# Patient Record
Sex: MALE | Race: WHITE | HISPANIC OR LATINO | Employment: FULL TIME | URBAN - METROPOLITAN AREA
[De-identification: names, ages, dates, MRNs, and addresses within clinical notes are randomized per-mention and may not be internally consistent; named-entity substitution may affect disease eponyms.]

---

## 2017-04-10 ENCOUNTER — APPOINTMENT (EMERGENCY)
Dept: RADIOLOGY | Facility: HOSPITAL | Age: 22
End: 2017-04-10
Payer: COMMERCIAL

## 2017-04-10 ENCOUNTER — HOSPITAL ENCOUNTER (EMERGENCY)
Facility: HOSPITAL | Age: 22
Discharge: HOME | End: 2017-04-10
Attending: EMERGENCY MEDICINE | Admitting: EMERGENCY MEDICINE
Payer: COMMERCIAL

## 2017-04-10 VITALS
HEIGHT: 67 IN | TEMPERATURE: 97.8 F | RESPIRATION RATE: 18 BRPM | OXYGEN SATURATION: 97 % | WEIGHT: 180 LBS | SYSTOLIC BLOOD PRESSURE: 153 MMHG | DIASTOLIC BLOOD PRESSURE: 84 MMHG | BODY MASS INDEX: 28.25 KG/M2 | HEART RATE: 112 BPM

## 2017-04-10 DIAGNOSIS — J45.901 ASTHMA EXACERBATION: Primary | ICD-10-CM

## 2017-04-10 DIAGNOSIS — B34.9 VIRAL SYNDROME: ICD-10-CM

## 2017-04-10 PROCEDURE — 99285 EMERGENCY DEPT VISIT HI MDM: CPT

## 2017-04-10 PROCEDURE — 94640 AIRWAY INHALATION TREATMENT: CPT

## 2017-04-10 PROCEDURE — 71020 HB CHEST X-RAY 2VW FRONTAL&LATL: CPT

## 2017-04-10 RX ORDER — ALBUTEROL SULFATE 2.5 MG/3ML
5 SOLUTION RESPIRATORY (INHALATION) ONCE
Status: COMPLETED | OUTPATIENT
Start: 2017-04-10 | End: 2017-04-10

## 2017-04-10 RX ORDER — PREDNISONE 50 MG/1
50 TABLET ORAL DAILY
Qty: 5 TABLET | Refills: 0 | Status: SHIPPED | OUTPATIENT
Start: 2017-04-10 | End: 2017-04-15

## 2017-04-10 RX ORDER — PREDNISONE 20 MG/1
60 TABLET ORAL ONCE
Status: COMPLETED | OUTPATIENT
Start: 2017-04-10 | End: 2017-04-10

## 2017-04-10 RX ORDER — GUAIFENESIN/DEXTROMETHORPHAN 100-10MG/5
10 SYRUP ORAL ONCE
Status: COMPLETED | OUTPATIENT
Start: 2017-04-10 | End: 2017-04-10

## 2017-04-10 RX ORDER — IPRATROPIUM BROMIDE AND ALBUTEROL SULFATE 2.5; .5 MG/3ML; MG/3ML
3 SOLUTION RESPIRATORY (INHALATION) ONCE
Status: DISCONTINUED | OUTPATIENT
Start: 2017-04-10 | End: 2017-04-10

## 2017-04-10 RX ORDER — IPRATROPIUM BROMIDE AND ALBUTEROL SULFATE 2.5; .5 MG/3ML; MG/3ML
SOLUTION RESPIRATORY (INHALATION)
Status: DISCONTINUED
Start: 2017-04-10 | End: 2017-04-10

## 2017-04-10 RX ORDER — ALBUTEROL SULFATE 90 UG/1
2 AEROSOL, METERED RESPIRATORY (INHALATION) EVERY 4 HOURS PRN
Qty: 1 INHALER | Refills: 0 | Status: SHIPPED | OUTPATIENT
Start: 2017-04-10 | End: 2017-05-10

## 2017-04-10 RX ADMIN — ALBUTEROL SULFATE 5 MG: 2.5 SOLUTION RESPIRATORY (INHALATION) at 07:19

## 2017-04-10 RX ADMIN — Medication 400 MG: at 10:08

## 2017-04-10 RX ADMIN — IPRATROPIUM BROMIDE 1 MG: 0.5 SOLUTION RESPIRATORY (INHALATION) at 07:19

## 2017-04-10 RX ADMIN — PREDNISONE 60 MG: 20 TABLET ORAL at 07:19

## 2017-04-10 RX ADMIN — ALBUTEROL SULFATE 5 MG: 2.5 SOLUTION RESPIRATORY (INHALATION) at 10:08

## 2017-04-10 RX ADMIN — GUAIFENESIN AND DEXTROMETHORPHAN 10 ML: 100; 10 SYRUP ORAL at 07:22

## 2017-04-12 ENCOUNTER — ALLSCRIPTS OFFICE VISIT (OUTPATIENT)
Dept: OTHER | Facility: OTHER | Age: 22
End: 2017-04-12

## 2017-05-18 ENCOUNTER — ALLSCRIPTS OFFICE VISIT (OUTPATIENT)
Dept: OTHER | Facility: OTHER | Age: 22
End: 2017-05-18

## 2017-12-08 ENCOUNTER — ALLSCRIPTS OFFICE VISIT (OUTPATIENT)
Dept: OTHER | Facility: OTHER | Age: 22
End: 2017-12-08

## 2017-12-09 NOTE — PROGRESS NOTES
Assessment    1  Mild intermittent asthma without complication (923 03) (J67 54)   2  Acute asthma exacerbation (493 92) (J45 901)   3  BMI 32 0-32 9,adult (V85 32) (Z68 32)   4  Obesity (BMI 30-39 9) (278 00) (E66 9)   5  Chronic allergic rhinitis due to other allergic trigger, unspecified seasonality (477 8) (J30 89)    Plan  Acute asthma exacerbation    · Flovent  MCG/ACT Inhalation Aerosol; 2 PUFF TWO TIMES a day,swish/spit after use   · PredniSONE 10 MG Oral Tablet; 40mg/d for 3d then 30mg/d for 3d then 20mg/d for3d then 10mg/d for 3days with food   · 1 Kevin Fisher MD, [facility]  *  Status: Active  Requestedfor: 54YJC2682  Care Summary provided  : Yes  Chronic allergic rhinitis due to other allergic trigger, unspecified seasonality    · Fluticasone Propionate 50 MCG/ACT Nasal Suspension; 2 puffs/nostril/day   · Levocetirizine Dihydrochloride 5 MG Oral Tablet; 1 every day  Mild intermittent asthma without complication    · Albuterol Sulfate (2 5 MG/3ML) 0 083% Inhalation Nebulization Solution; USE 1UNIT DOSE IN NEBULIZER EVERY 4 TO 6 HOURS AS NEEDED   · Ventolin  (90 Base) MCG/ACT Inhalation Aerosol Solution; inhale 2 puffsby mouth every 4 hours if needed, swish and spit after use  Obesity (BMI 30-39  9)    · Begin a limited exercise program ; Status:Complete;   Done: 90RPT0780 09:51PM   · Eat a low fat and low cholesterol diet ; Status:Complete;   Done: 87QLE4132 09:51PM   · Shared Decision Making Aid given; Status:Complete;   Done: 98JPL5605 09:51PM   · Some eating tips that can help you lose weight ; Status:Complete;   Done: 29Jsa244019:51PM   · We recommend that you bring your body mass index down to 26 ; Status:Complete;  Done: 18WBI1298 09:51PM    Discussion/Summary  The patient was counseled regarding risk factor reductions,-- impressions,-- risks and benefits of treatment options     Possible side effects of new medications were reviewed with the patient/guardian today  The treatment plan was reviewed with the patient/guardian  The patient/guardian understands and agrees with the treatment plan      Provider Comments  Provider Comments:   asthma exacsx scores discussedvs rescue use explainednew, treat to reduce triggerrisks and ER risks advisedin 2w if not seen by pulmonaryadvised      Chief Complaint  Pt c/o chronic wheezing for the past two months  Pt states he thinks he needs to see a pulmonologist  sp/cma      History of Present Illness  HPI: 2m worsechange triggerto past with season changenot have episodes like this when he lived in [place] am needed latelyMDI every hour all daynew petspast 2mcongestionER for asthma since Aprilf/c      Review of Systems   Constitutional: no fever-- and-- no chills  Cardiovascular: no chest pain  Respiratory: shortness of breath-- and-- wheezing  Active Problems    1  Chronic allergic rhinitis due to other allergic trigger, unspecified seasonality (477 8) (J30 89)   2  IBS (irritable bowel syndrome) (564 1) (K58 9)   3  Kerion of occipital region of scalp (110 0) (B35 0)   4  Mild intermittent asthma without complication (704 20) (B44 48)    Past Medical History  1  History of Acute asthma exacerbation (493 92) (J45 901)   2  History of Acute bronchitis, unspecified organism (466 0) (J20 9)    Family History  Mother    1  Family history of hypertension (V17 49) (Z82 49)  Father    2  Family history of alcoholism (V17 0) (Z81 1)   3  Family history of Tobacco abuse  Family History Reviewed: The family history was reviewed and updated today  Social History   · Never a smoker  The social history was reviewed and updated today  Surgical History    1  History of Wrist Surgery    Current Meds   1  Albuterol Sulfate (2 5 MG/3ML) 0 083% Inhalation Nebulization Solution; USE 1 UNIT DOSE IN NEBULIZER EVERY 4 TO 6 HOURS AS NEEDED; Therapy: 12Apr2017 to (Last Rx:12Apr2017)  Requested for: 43HJK3554 Ordered   2   Ventolin  (90 Base) MCG/ACT Inhalation Aerosol Solution; USE 2 PUFFS EVERY 6 HOURS AS DIRECTED; Therapy: 05Qwb2366 to (Last Rx:29Lxw2476) Ordered    Allergies  1  No Known Drug Allergies    Vitals   Recorded: 35MXB2021 10:04AM   Temperature 97 3 F   Heart Rate 80   Respiration 16   Systolic 731   Diastolic 76   Weight 497 lb        Physical Exam   Constitutional  General appearance: No acute distress, well appearing and well nourished  Eyes  Conjunctiva and lids: No swelling, erythema, or discharge  Pupils and irises: Equal, round and reactive to light  Ears, Nose, Mouth, and Throat  External inspection of ears and nose: Normal    Otoscopic examination: Tympanic membrance translucent with normal light reflex  Canals patent without erythema  Nasal mucosa, septum, and turbinates: Normal without edema or erythema  Oropharynx: Normal with no erythema, edema, exudate or lesions  Pulmonary  Respiratory effort: Abnormal   Respiratory rate: normal  Respiratory Findings: audible wheezing, but-- no stridor-- and-- no mouth breathing  Auscultation of lungs: Clear to auscultation, equal breath sounds bilaterally, no wheezes, no rales, no rhonci  Cardiovascular  Auscultation of heart: Normal rate and rhythm, normal S1 and S2, without murmurs  Examination of extremities for edema and/or varicosities: Normal    Abdomen  Abdomen: Non-tender, no masses  Lymphatic  Palpation of lymph nodes in neck: No lymphadenopathy  Musculoskeletal  Gait and station: Normal    Digits and nails: Normal without clubbing or cyanosis  Skin  Skin and subcutaneous tissue: Normal without rashes or lesions  Psychiatric  Mood and affect: Normal          Message  Return to work or school:   Sameer Jara is under my professional care  He was seen in my office on 12/8/17  Excuse from work today  Future Appointments    Date/Time Provider Specialty Site   12/11/2017 08:45 AM KAITLYN Rodriges   Pulmonary Medicine [facility]   Electronically signed by : Everardo House DO; Dec  8 2017  9:53PM EST                       (Author)

## 2017-12-11 ENCOUNTER — ALLSCRIPTS OFFICE VISIT (OUTPATIENT)
Dept: OTHER | Facility: OTHER | Age: 22
End: 2017-12-11

## 2017-12-13 NOTE — CONSULTS
Assessment  1  SOB (shortness of breath) on exertion (786 05) (R06 02)   2  Productive cough (786 2) (R05)   3  Chest tightness (786 59) (R07 89)   4  Acute asthma exacerbation (493 92) (J45 902)    Results/Data  Goodyear Sleepiness Score 33VGD4024 09:05AM User, Ahs     Test Name Result Flag Reference   Goodyear Score 4 - Normal     Answer Summary: Q1-0, Q2-0, Q3-0, Q4-0, Q5-1, Q6-0, Q7-3, Q8-0     STOP BANG Questionnaire 27FNH7026 09:05AM User, Ahs     Test Name Result Flag Reference   STOP BANG Questionnaire Risk of GLORIA Intermediate Risk       Snoring: Yes Tired: Yes Observed: No Blood Pressure: No BMI: No Age: No Neck Circumference: No Gender: Yes   STOP BANG Questionnaire GLORIA Total Score 3       Snoring: Yes Tired: Yes Observed: No Blood Pressure: No BMI: No Age: No Neck Circumference: No Gender: Yes       Results Free Text Form [facility]:   Results  Chest X-ray Chest x-ray last performed in April of 2017 is reviewed  PFT Results v2:     Spirometry: Forced vital capacity: 4 53L-- and-- 89% Predicted Values  Forced expiratory volume in one second: 3 3 8L-- and-- 80% Predicted Value  FEV1/FVC ratio is 75% Predicted Values  Post Bronchodilator Spirometry:   Lung Volumes:   DLCO:   PFT Interpretation:  no obstructive defect  Discussion/Summary  Discussion Summary:   1  Acute asthma exacerbation secondary to cold weather and pets that his mom has  Environmental control in addition to pharmacotherapy is equally important and this is discussed in detail with the patient  He may use a scarf to cover his nose and mouth in order to humidify and warm the air upon breathing  For now he will continue with the prednisone taper, Flovent, nebulizer and albuterol as needed  His usage has improved since the prednisone has been started  Chest imaging is reviewed with him in detail today  2  Follow up in 2 weeks or sooner if needed  All questions are answered to his satisfaction understanding   He verbalized understanding  He is urged call with any further questions or concerns  Goals and Barriers: The patient has the current Goals: To gain control over asthma  The patent has the current Barriers: Environmental triggers  Patient's Capacity to Self-Care: Patient is able to Self-Care  Patient Education: Educational resources provided: None given  Medication SE Review and Pt Understands Tx: Possible side effects of new medications were reviewed with the patient/guardian today  The treatment plan was reviewed with the patient/guardian  The patient/guardian understands and agrees with the treatment plan      Active Problems     · Acute asthma exacerbation (493 92) (J45 901)   · BMI 32 0-32 9,adult (V85 32) (Z68 32)   · Chronic allergic rhinitis due to other allergic trigger, unspecified seasonality (477 8)(J30 89)   · IBS (irritable bowel syndrome) (564 1) (K58 9)   · Kerion of occipital region of scalp (110 0) (B35 0)   · Mild intermittent asthma without complication (367 80) (V98 07)   · Obesity (BMI 30-39 9) (278 00) (E66 9)    Chief Complaint  Chief Complaint Free Text Note Form: Pt presents today for asthma per Dr Krzysztof Terrell  Pt states that when he wakes up he has sob and has to use rescue inhaler  History of Present Illness  HPI: Patient is a 19 yo male with a long standing history of Asthma, seasonal allergies Who presents for initial evaluation of asthma  the cold weather  breathing is worse  uses nebulizer every single morning- intense wheezing and coughing  Nights when he wakes up at 3 am with wheezing and coughing- using ventolin inhaler every 40 min at night  is from [place]- He used inhaler once every few weeks  Seasonal allergies- triggers this  also pets trigger thisis moving to [address] to the [facility] he has been using the nebulizer and rescue inhaler  Started on Flovent- started on that 2 days ago  Rinsing mouth out after Flovent  Waking up with less wheezing   Prednisone taper at this time    He was on Flovent in the past when he was in high school  hospitalization for asthma was in April  Never been intubated  postnasal drip  No heartburn  history- does not smoke cigarettes  Used to smoke THC- 2 months ago  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  from [place] [address] to [place] last Sept 2016  Review of Systems  Complete-Male Pulm:  Constitutional: no fever,-- no chills-- and-- not feeling tired  Eyes: no purulent discharge from the eyes  ENT: no nasal discharge  Cardiovascular: palpitations-- and-- chest tightness with wheezing  , but-- no chest pain-- and-- no extremity edema  Respiratory: as noted in HPI  Gastrointestinal: no abdominal pain,-- no nausea,-- no vomiting-- and-- no diarrhea  Genitourinary: no dysuria  Musculoskeletal: no arthralgias  Integumentary: no rashes  Neurological: no headache,-- no confusion-- and-- no dizziness  Psychiatric: no anxiety-- and-- no depression  Endocrine: no muscle weakness  Hematologic/Lymphatic: no swollen glands  Past Medical History  1  History of Acute asthma exacerbation (493 92) (J45 901)   2  History of Acute bronchitis, unspecified organism (466 0) (J20 9)  Active Problems And Past Medical History Reviewed: The active problems and past medical history were reviewed and updated today  Surgical History  1  History of Wrist Surgery  Surgical History Reviewed: The surgical history was reviewed and updated today  Family History  Mother    1  Family history of hypertension (V17 49) (Z82 49)  Father    2  Family history of alcoholism (V17 0) (Z81 1)   3  Family history of Tobacco abuse  Family History Reviewed: The family history was reviewed and updated today  Social History     · Consumes alcohol occasionally (V49 89) (Z78 9)   · Never a smoker   · Pets/Animals: Dog   · Single   · Travel to [place]  [facility] [address] History Reviewed:  The social history was reviewed and updated today  The social history was reviewed and is unchanged  Current Meds   1  Albuterol Sulfate (2 5 MG/3ML) 0 083% Inhalation Nebulization Solution; USE 1 UNIT DOSE IN NEBULIZER EVERY 4 TO 6 HOURS AS NEEDED; Therapy: 12Apr2017 to (Last Rx:09Knw5729)  Requested for: 65UST0475 Ordered   2  Flovent  MCG/ACT Inhalation Aerosol; 2 PUFF TWO TIMES a day, swish/spit after use; Therapy: 06MMQ3931 to (Last Rx:14Nks3689)  Requested for: 40FNM8011 Ordered   3  Fluticasone Propionate 50 MCG/ACT Nasal Suspension; 2 puffs/nostril/day; Therapy: 59DYJ9283 to (Last Rx:40Hgs6278)  Requested for: 15LTL4042 Ordered   4  Levocetirizine Dihydrochloride 5 MG Oral Tablet; 1 every day; Therapy: 82MYP0696 to (Last Rx:47Fki3928)  Requested for: 33ZHN7688 Ordered   5  PredniSONE 10 MG Oral Tablet; 40mg/d for 3d then 30mg/d for 3d then 20mg/d for 3d then 10mg/d for 3days with food; Therapy: 57LQK0560 to (Liz Acharya)  Requested for: 83BLM7037; Last Rx:76Sxp1534 Ordered   6  Ventolin  (90 Base) MCG/ACT Inhalation Aerosol Solution; inhale 2 puffs by mouth every 4 hours if needed, swish and spit after use; Therapy: 12Apr2017 to (Jaylin Maravilla)  Requested for: 62JVZ3447; Last Rx:09Xns7481 Ordered  Medication List Reviewed: The medication list was reviewed and updated today  Allergies  1  No Known Drug Allergies    Vitals  Vital Signs    Recorded: 11Dec2017 08:50AM   Temperature 98 1 F, Oral   Heart Rate 81   Pulse Quality Normal   Respiration Quality Normal   Respiration 12   Systolic 535, LUE, Standing   Diastolic 82, LUE, Standing   Height 5 ft 7 in   Weight 207 lb    BMI Calculated 32 42   BSA Calculated 2 05   O2 Saturation 96, RA       Physical Exam   Constitutional  General appearance: No acute distress, well appearing and well nourished  Eyes  Examination of pupil and irises: Anicteric, pupils reactive    Ears, Nose, Mouth, and Throat  External inspection of ears and nose: Normal    Lips, teeth, and gums: Normal, good dentition  Oropharynx: Normal with no erythema, edema, exudate or lesions  Neck  Neck: Supple, symmetric, trachea midline, no masses  Pulmonary  Chest: Normal    Respiratory effort: No increased work of breathing or signs of respiratory distress  Auscultation of lungs: Clear to auscultation, no rales, no crackles, no wheezing  Cardiovascular  Auscultation of heart: Normal rate and rhythm, normal S1 and S2, no murmurs  Examination of extremities for edema and/or varicosities: Normal    Abdomen  Abdomen: Soft, non-tender  Lymphatic  Palpation of lymph nodes in neck: No lymphadenopathy  Musculoskeletal  Gait and station: Normal    Digits and nails: Normal without clubbing or cyanosis  Neurologic  Mental Status: Normal  Not confused, no evidence of dementia, good comprehension, good concentration  Skin  Skin and subcutaneous tissue: Limited exam shows no rash  Psychiatric  Orientation to person, place and time: Normal    Mood and affect: Normal        Future Appointments    Date/Time Provider Specialty Site   01/03/2018 09:30 AM KAITLYN Ballard   Pulmonary Medicine [address] [facility]       Signatures   Electronically signed by : KAITLYN Alvarez ; Dec 12 2017 10:47AM EST                       (Author)

## 2018-01-12 VITALS
RESPIRATION RATE: 20 BRPM | TEMPERATURE: 97.6 F | BODY MASS INDEX: 32.33 KG/M2 | WEIGHT: 206 LBS | DIASTOLIC BLOOD PRESSURE: 78 MMHG | HEIGHT: 67 IN | SYSTOLIC BLOOD PRESSURE: 122 MMHG | HEART RATE: 76 BPM

## 2018-01-13 VITALS
TEMPERATURE: 97.2 F | WEIGHT: 197.25 LBS | DIASTOLIC BLOOD PRESSURE: 70 MMHG | RESPIRATION RATE: 24 BRPM | HEIGHT: 67 IN | BODY MASS INDEX: 30.96 KG/M2 | HEART RATE: 72 BPM | SYSTOLIC BLOOD PRESSURE: 112 MMHG | OXYGEN SATURATION: 96 %

## 2018-01-22 VITALS
RESPIRATION RATE: 12 BRPM | HEIGHT: 67 IN | BODY MASS INDEX: 32.49 KG/M2 | OXYGEN SATURATION: 96 % | DIASTOLIC BLOOD PRESSURE: 82 MMHG | SYSTOLIC BLOOD PRESSURE: 142 MMHG | HEART RATE: 81 BPM | TEMPERATURE: 98.1 F | WEIGHT: 207 LBS

## 2018-01-23 VITALS
DIASTOLIC BLOOD PRESSURE: 76 MMHG | BODY MASS INDEX: 31.95 KG/M2 | TEMPERATURE: 97.3 F | SYSTOLIC BLOOD PRESSURE: 132 MMHG | HEART RATE: 80 BPM | WEIGHT: 204 LBS | RESPIRATION RATE: 16 BRPM

## 2018-01-23 NOTE — MISCELLANEOUS
Message  Return to work or school:   Maikel Rae is under my professional care  He was seen in my office on [id number]   He is able to return to work on  [id number]            Signatures   Electronically signed by :  Rossana Montaño, ; Dec 11 2017  9:22AM EST                       (Author)

## 2018-01-23 NOTE — MISCELLANEOUS
Message  Return to work or school:   Danyell Sawant is under my professional care  He was seen in my office on 12/8/17  Excuse from work today               Future Appointments    Signatures   Electronically signed by : Olga Baldwin DO; Dec  8 2017  9:53PM EST                       (Author)

## 2018-06-04 ENCOUNTER — OFFICE VISIT (OUTPATIENT)
Dept: FAMILY MEDICINE CLINIC | Facility: CLINIC | Age: 23
End: 2018-06-04
Payer: COMMERCIAL

## 2018-06-04 VITALS
RESPIRATION RATE: 16 BRPM | BODY MASS INDEX: 29.19 KG/M2 | SYSTOLIC BLOOD PRESSURE: 132 MMHG | HEIGHT: 67 IN | DIASTOLIC BLOOD PRESSURE: 76 MMHG | TEMPERATURE: 96.7 F | WEIGHT: 186 LBS | HEART RATE: 78 BPM

## 2018-06-04 DIAGNOSIS — Z13.83 SCREENING FOR CARDIOVASCULAR, RESPIRATORY, AND GENITOURINARY DISEASES: ICD-10-CM

## 2018-06-04 DIAGNOSIS — Z13.1 SCREENING FOR DIABETES MELLITUS (DM): ICD-10-CM

## 2018-06-04 DIAGNOSIS — Z13.89 SCREENING FOR CARDIOVASCULAR, RESPIRATORY, AND GENITOURINARY DISEASES: ICD-10-CM

## 2018-06-04 DIAGNOSIS — Z13.6 SCREENING FOR CARDIOVASCULAR, RESPIRATORY, AND GENITOURINARY DISEASES: ICD-10-CM

## 2018-06-04 DIAGNOSIS — J45.41 MODERATE PERSISTENT ASTHMA WITH ACUTE EXACERBATION: Primary | ICD-10-CM

## 2018-06-04 DIAGNOSIS — J30.1 SEASONAL ALLERGIC RHINITIS DUE TO POLLEN: ICD-10-CM

## 2018-06-04 PROBLEM — J30.89 CHRONIC ALLERGIC RHINITIS DUE TO OTHER ALLERGIC TRIGGER, UNSPECIFIED SEASONALITY: Status: ACTIVE | Noted: 2017-04-12

## 2018-06-04 PROBLEM — J45.901 ACUTE ASTHMA EXACERBATION: Status: ACTIVE | Noted: 2017-12-08

## 2018-06-04 PROBLEM — J30.2 SEASONAL ALLERGIC RHINITIS: Status: ACTIVE | Noted: 2017-04-12

## 2018-06-04 PROBLEM — E66.9 OBESITY (BMI 30-39.9): Status: ACTIVE | Noted: 2017-12-08

## 2018-06-04 PROCEDURE — 99214 OFFICE O/P EST MOD 30 MIN: CPT | Performed by: FAMILY MEDICINE

## 2018-06-04 RX ORDER — FLUTICASONE PROPIONATE 50 MCG
2 SPRAY, SUSPENSION (ML) NASAL DAILY
Qty: 16 G | Refills: 5 | Status: SHIPPED | OUTPATIENT
Start: 2018-06-04 | End: 2020-01-18 | Stop reason: ALTCHOICE

## 2018-06-04 RX ORDER — ALBUTEROL SULFATE 2.5 MG/3ML
1 SOLUTION RESPIRATORY (INHALATION)
COMMUNITY
Start: 2017-04-12 | End: 2019-04-13 | Stop reason: SDUPTHER

## 2018-06-04 RX ORDER — FLUTICASONE PROPIONATE 50 MCG
2 SPRAY, SUSPENSION (ML) NASAL DAILY
COMMUNITY
Start: 2017-12-08 | End: 2018-06-04 | Stop reason: SDUPTHER

## 2018-06-04 RX ORDER — ALBUTEROL SULFATE 90 UG/1
AEROSOL, METERED RESPIRATORY (INHALATION)
COMMUNITY
Start: 2017-04-12 | End: 2018-06-04 | Stop reason: SDUPTHER

## 2018-06-04 RX ORDER — ALBUTEROL SULFATE 90 UG/1
2 AEROSOL, METERED RESPIRATORY (INHALATION) EVERY 4 HOURS PRN
Qty: 1 INHALER | Refills: 5 | Status: SHIPPED | OUTPATIENT
Start: 2018-06-04 | End: 2019-11-16 | Stop reason: SDUPTHER

## 2018-06-04 RX ORDER — FLUTICASONE FUROATE AND VILANTEROL 200; 25 UG/1; UG/1
1 POWDER RESPIRATORY (INHALATION) DAILY
Qty: 1 INHALER | Refills: 5 | Status: SHIPPED | OUTPATIENT
Start: 2018-06-04 | End: 2019-05-06 | Stop reason: SDUPTHER

## 2018-06-04 RX ORDER — METHYLPREDNISOLONE 4 MG/1
TABLET ORAL
Qty: 21 TABLET | Refills: 0 | Status: SHIPPED | OUTPATIENT
Start: 2018-06-04 | End: 2018-06-10

## 2018-06-04 NOTE — PATIENT INSTRUCTIONS
Miriam Banter is a combination steroid and long acting "bronchodilator" not used for rescue but for prevention

## 2018-06-04 NOTE — PROGRESS NOTES
Assessment/Plan:    No problem-specific Assessment & Plan notes found for this encounter  Asthma flare/exacerabation today  Not well controlled before recent trigger  Medrol  Start breo if covered  Asthma sx scores advised  Allergy control advised with H1B and INS and avoidance  Seasonal allergies unchanged  Labs for cpe given     Diagnoses and all orders for this visit:    Moderate persistent asthma with acute exacerbation  -     fluticasone-vilanterol (BREO ELLIPTA) 200-25 MCG/INH inhaler; Inhale 1 puff daily Rinse mouth after use  -     albuterol (VENTOLIN HFA) 90 mcg/act inhaler; Inhale 2 puffs every 4 (four) hours as needed for wheezing  -     Methylprednisolone 4 MG TBPK; Use as directed on package    Seasonal allergic rhinitis due to pollen  -     fluticasone (FLONASE) 50 mcg/act nasal spray; 2 sprays into each nostril daily    Screening for cardiovascular, respiratory, and genitourinary diseases  -     Lipid Panel with Direct LDL reflex; Future    Screening for diabetes mellitus (DM)  -     Comprehensive metabolic panel; Future    Other orders  -     Discontinue: albuterol (VENTOLIN HFA) 90 mcg/act inhaler; Inhale  -     albuterol (2 5 mg/3 mL) 0 083 % nebulizer solution; Inhale 1 each  -     Discontinue: fluticasone (FLONASE) 50 mcg/act nasal spray; 2 puffs into each nostril daily              Return in about 4 weeks (around 7/2/2018) for Annual physical     Subjective:      Patient ID: Nathaniel Carter is a 20 y o  male  No chief complaint on file        HPI    Has asthma  Moved to [place]  Asthma trigger dogs at mom's home  Better out of home  Last pm flare last pm, had flare  Better on BREO trial  Pollen trigger and cold triggers  Rescue inhaler about 3x/d normally  Did not keep f/u with pulm    The following portions of the patient's history were reviewed and updated as appropriate: allergies, current medications, past family history, past medical history, past social history, past surgical history and problem list     Review of Systems   Constitutional: Negative for chills and fever  Neurological: Negative for syncope  Current Outpatient Prescriptions   Medication Sig Dispense Refill    fluticasone (FLONASE) 50 mcg/act nasal spray 2 sprays into each nostril daily 16 g 5    albuterol (2 5 mg/3 mL) 0 083 % nebulizer solution Inhale 1 each      albuterol (VENTOLIN HFA) 90 mcg/act inhaler Inhale 2 puffs every 4 (four) hours as needed for wheezing 1 Inhaler 5    fluticasone-vilanterol (BREO ELLIPTA) 200-25 MCG/INH inhaler Inhale 1 puff daily Rinse mouth after use  1 Inhaler 5    Methylprednisolone 4 MG TBPK Use as directed on package 21 tablet 0     No current facility-administered medications for this visit  Objective:    /76   Pulse 78   Temp (!) 96 7 °F (35 9 °C)   Resp 16   Ht 5' 7" (1 702 m)   Wt 84 4 kg (186 lb)   BMI 29 13 kg/m²        Physical Exam   Constitutional: He appears well-developed  HENT:   Head: Normocephalic  Eyes: Conjunctivae are normal    Neck: Neck supple  Cardiovascular: Normal rate and intact distal pulses  No murmur heard  Pulmonary/Chest: Effort normal  No respiratory distress  He has wheezes  He has no rales  He exhibits no tenderness  Abdominal: Soft  Musculoskeletal: He exhibits no edema or deformity  Neurological: He is alert  Skin: Skin is warm and dry  Psychiatric: His behavior is normal  Thought content normal    Nursing note and vitals reviewed               Sheri Decker DO

## 2018-08-21 ENCOUNTER — OFFICE VISIT (OUTPATIENT)
Dept: FAMILY MEDICINE CLINIC | Facility: CLINIC | Age: 23
End: 2018-08-21
Payer: COMMERCIAL

## 2018-08-21 VITALS
HEIGHT: 67 IN | WEIGHT: 180 LBS | RESPIRATION RATE: 16 BRPM | BODY MASS INDEX: 28.25 KG/M2 | TEMPERATURE: 96 F | HEART RATE: 60 BPM | SYSTOLIC BLOOD PRESSURE: 120 MMHG | DIASTOLIC BLOOD PRESSURE: 88 MMHG

## 2018-08-21 DIAGNOSIS — K59.09 CHRONIC CONSTIPATION: Primary | ICD-10-CM

## 2018-08-21 PROBLEM — J45.901 ACUTE ASTHMA EXACERBATION: Status: RESOLVED | Noted: 2017-12-08 | Resolved: 2018-08-21

## 2018-08-21 PROCEDURE — 99213 OFFICE O/P EST LOW 20 MIN: CPT | Performed by: FAMILY MEDICINE

## 2018-08-21 PROCEDURE — 3008F BODY MASS INDEX DOCD: CPT | Performed by: FAMILY MEDICINE

## 2018-08-21 RX ORDER — DOCUSATE SODIUM 100 MG/1
100 CAPSULE, LIQUID FILLED ORAL 2 TIMES DAILY
Qty: 60 CAPSULE | Refills: 5 | Status: SHIPPED | OUTPATIENT
Start: 2018-08-21 | End: 2020-01-18 | Stop reason: ALTCHOICE

## 2018-08-21 NOTE — PROGRESS NOTES
Assessment/Plan:    No problem-specific Assessment & Plan notes found for this encounter  Colonoscopy if no better     Diagnoses and all orders for this visit:    Chronic constipation  -     docusate sodium (COLACE) 100 mg capsule; Take 1 capsule (100 mg total) by mouth 2 (two) times a day      fiber, fluids  ibs-C  Stool retention in prior studies  F/u if no better        Return if symptoms worsen or fail to improve  Subjective:      Patient ID: Marcie Shelley is a 20 y o  male  Chief Complaint   Patient presents with    Abdominal Pain     prcma    Nausea    Constipation    Diarrhea       HPI  Kulkarni Bushra now but was sick last week  N w/o V  Light headed at times  Stomach pain for years  No food changes  Woke with sx  More severe this time  Once every few wks  No bowel changes or blood in stool, hx of blood streaks at times  Constipation lately  Rossana LUQ  bm 1-3x/d past week or so  Hard/firm, yanni      The following portions of the patient's history were reviewed and updated as appropriate: allergies, current medications, past family history, past medical history, past social history, past surgical history and problem list     Review of Systems   Constitutional: Negative for fever  Respiratory: Negative for shortness of breath  Current Outpatient Prescriptions   Medication Sig Dispense Refill    albuterol (2 5 mg/3 mL) 0 083 % nebulizer solution Inhale 1 each      albuterol (VENTOLIN HFA) 90 mcg/act inhaler Inhale 2 puffs every 4 (four) hours as needed for wheezing 1 Inhaler 5    fluticasone (FLONASE) 50 mcg/act nasal spray 2 sprays into each nostril daily 16 g 5    fluticasone-vilanterol (BREO ELLIPTA) 200-25 MCG/INH inhaler Inhale 1 puff daily Rinse mouth after use  1 Inhaler 5    docusate sodium (COLACE) 100 mg capsule Take 1 capsule (100 mg total) by mouth 2 (two) times a day 60 capsule 5     No current facility-administered medications for this visit          Objective:    /88 Pulse 60   Temp (!) 96 °F (35 6 °C)   Resp 16   Ht 5' 7" (1 702 m)   Wt 81 6 kg (180 lb)   BMI 28 19 kg/m²        Physical Exam   Constitutional: He appears well-developed  HENT:   Head: Normocephalic  Eyes: Conjunctivae are normal    Neck: Neck supple  Cardiovascular: Normal rate and intact distal pulses  Pulmonary/Chest: Effort normal  No respiratory distress  Abdominal: Soft  Musculoskeletal: He exhibits no edema or deformity  Neurological: He is alert  Skin: Skin is warm and dry  Psychiatric: His behavior is normal  Thought content normal    Nursing note and vitals reviewed               Stanislav Weeks DO

## 2018-08-22 LAB
ALBUMIN SERPL-MCNC: 5 G/DL (ref 3.5–5.5)
ALBUMIN/GLOB SERPL: 1.9 {RATIO} (ref 1.2–2.2)
ALP SERPL-CCNC: 94 IU/L (ref 39–117)
ALT SERPL-CCNC: 38 IU/L (ref 0–44)
AMBIG ABBREV DEFAULT: NORMAL
AST SERPL-CCNC: 22 IU/L (ref 0–40)
BILIRUB SERPL-MCNC: 1.2 MG/DL (ref 0–1.2)
BUN SERPL-MCNC: 12 MG/DL (ref 6–20)
BUN/CREAT SERPL: 11 (ref 9–20)
CALCIUM SERPL-MCNC: 9.9 MG/DL (ref 8.7–10.2)
CHLORIDE SERPL-SCNC: 99 MMOL/L (ref 96–106)
CHOLEST SERPL-MCNC: 165 MG/DL (ref 100–199)
CO2 SERPL-SCNC: 24 MMOL/L (ref 20–29)
CREAT SERPL-MCNC: 1.12 MG/DL (ref 0.76–1.27)
GLOBULIN SER-MCNC: 2.6 G/DL (ref 1.5–4.5)
GLUCOSE SERPL-MCNC: 93 MG/DL (ref 65–99)
HDLC SERPL-MCNC: 49 MG/DL
LDLC SERPL CALC-MCNC: 101 MG/DL (ref 0–99)
MICRODELETION SYND BLD/T FISH: NORMAL
POTASSIUM SERPL-SCNC: 4.7 MMOL/L (ref 3.5–5.2)
PROT SERPL-MCNC: 7.6 G/DL (ref 6–8.5)
SL AMB EGFR AFRICAN AMERICAN: 106 ML/MIN/1.73
SL AMB EGFR NON AFRICAN AMERICAN: 92 ML/MIN/1.73
SODIUM SERPL-SCNC: 140 MMOL/L (ref 134–144)
TRIGL SERPL-MCNC: 75 MG/DL (ref 0–149)

## 2018-11-06 ENCOUNTER — APPOINTMENT (EMERGENCY)
Dept: RADIOLOGY | Facility: HOSPITAL | Age: 23
End: 2018-11-06
Payer: COMMERCIAL

## 2018-11-06 ENCOUNTER — HOSPITAL ENCOUNTER (EMERGENCY)
Facility: HOSPITAL | Age: 23
Discharge: HOME | End: 2018-11-06
Attending: EMERGENCY MEDICINE | Admitting: EMERGENCY MEDICINE
Payer: COMMERCIAL

## 2018-11-06 VITALS
HEART RATE: 78 BPM | RESPIRATION RATE: 18 BRPM | WEIGHT: 165 LBS | BODY MASS INDEX: 25.9 KG/M2 | HEIGHT: 67 IN | TEMPERATURE: 98.1 F | SYSTOLIC BLOOD PRESSURE: 142 MMHG | DIASTOLIC BLOOD PRESSURE: 66 MMHG | OXYGEN SATURATION: 99 %
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
## 2018-11-06 DIAGNOSIS — M54.42 ACUTE LEFT-SIDED LOW BACK PAIN WITH LEFT-SIDED SCIATICA: Primary | ICD-10-CM

## 2018-11-06 PROCEDURE — 99284 EMERGENCY DEPT VISIT MOD MDM: CPT

## 2018-11-06 PROCEDURE — 72100 X-RAY EXAM L-S SPINE 2/3 VWS: CPT

## 2018-11-06 PROCEDURE — 96372 THER/PROPH/DIAG INJ SC/IM: CPT

## 2018-11-06 RX ORDER — LIDOCAINE 50 MG/G
1 PATCH TOPICAL ONCE
Status: DISCONTINUED | OUTPATIENT
Start: 2018-11-06 | End: 2018-11-06 | Stop reason: HOSPADM

## 2018-11-06 RX ORDER — CYCLOBENZAPRINE HCL 5 MG
TABLET ORAL
Qty: 12 TABLET | Refills: 0 | Status: SHIPPED | OUTPATIENT
Start: 2018-11-06 | End: 2020-01-18 | Stop reason: ALTCHOICE

## 2018-11-06 RX ORDER — KETOROLAC TROMETHAMINE 30 MG/ML
30 INJECTION, SOLUTION INTRAMUSCULAR; INTRAVENOUS ONCE
Status: COMPLETED | OUTPATIENT
Start: 2018-11-06 | End: 2018-11-06

## 2018-11-06 RX ORDER — METHYLPREDNISOLONE 4 MG/1
TABLET ORAL
Qty: 21 TABLET | Refills: 0 | Status: SHIPPED | OUTPATIENT
Start: 2018-11-06 | End: 2020-01-18 | Stop reason: ALTCHOICE

## 2018-11-06 RX ADMIN — LIDOCAINE 1 PATCH: 50 PATCH CUTANEOUS at 09:14

## 2018-11-06 RX ADMIN — KETOROLAC TROMETHAMINE 30 MG: 30 INJECTION, SOLUTION INTRAMUSCULAR at 09:12
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
## 2018-11-06 NOTE — ED NOTES
Pt returns to the room, via w/c, after having x-rays performed       Arsalan Mistry RN  11/06/18 4060
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
## 2019-04-13 ENCOUNTER — OFFICE VISIT (OUTPATIENT)
Dept: FAMILY MEDICINE CLINIC | Facility: CLINIC | Age: 24
End: 2019-04-13
Payer: COMMERCIAL

## 2019-04-13 VITALS
BODY MASS INDEX: 25.33 KG/M2 | SYSTOLIC BLOOD PRESSURE: 128 MMHG | HEART RATE: 100 BPM | WEIGHT: 161.4 LBS | HEIGHT: 67 IN | RESPIRATION RATE: 16 BRPM | DIASTOLIC BLOOD PRESSURE: 78 MMHG | TEMPERATURE: 97.9 F

## 2019-04-13 DIAGNOSIS — B34.9 VIRAL ILLNESS: Primary | ICD-10-CM

## 2019-04-13 DIAGNOSIS — J45.41 MODERATE PERSISTENT ASTHMA WITH ACUTE EXACERBATION: Primary | ICD-10-CM

## 2019-04-13 PROCEDURE — 1036F TOBACCO NON-USER: CPT | Performed by: NURSE PRACTITIONER

## 2019-04-13 PROCEDURE — 3008F BODY MASS INDEX DOCD: CPT | Performed by: NURSE PRACTITIONER

## 2019-04-13 PROCEDURE — 99213 OFFICE O/P EST LOW 20 MIN: CPT | Performed by: NURSE PRACTITIONER

## 2019-04-13 RX ORDER — ONDANSETRON 4 MG/1
4 TABLET, FILM COATED ORAL EVERY 8 HOURS PRN
Qty: 20 TABLET | Refills: 0 | Status: SHIPPED | OUTPATIENT
Start: 2019-04-13 | End: 2020-01-18 | Stop reason: ALTCHOICE

## 2019-04-15 RX ORDER — ALBUTEROL SULFATE 2.5 MG/3ML
SOLUTION RESPIRATORY (INHALATION)
Qty: 120 VIAL | Refills: 6 | Status: SHIPPED | OUTPATIENT
Start: 2019-04-15 | End: 2019-11-16 | Stop reason: SDUPTHER

## 2019-05-06 DIAGNOSIS — J45.41 MODERATE PERSISTENT ASTHMA WITH ACUTE EXACERBATION: ICD-10-CM

## 2019-05-08 ENCOUNTER — TELEPHONE (OUTPATIENT)
Dept: FAMILY MEDICINE CLINIC | Facility: CLINIC | Age: 24
End: 2019-05-08

## 2019-10-14 DIAGNOSIS — J45.41 MODERATE PERSISTENT ASTHMA WITH ACUTE EXACERBATION: ICD-10-CM

## 2019-11-16 ENCOUNTER — OFFICE VISIT (OUTPATIENT)
Dept: FAMILY MEDICINE CLINIC | Facility: CLINIC | Age: 24
End: 2019-11-16
Payer: COMMERCIAL

## 2019-11-16 VITALS
BODY MASS INDEX: 26.9 KG/M2 | TEMPERATURE: 97.4 F | OXYGEN SATURATION: 99 % | HEART RATE: 83 BPM | HEIGHT: 67 IN | RESPIRATION RATE: 20 BRPM | DIASTOLIC BLOOD PRESSURE: 76 MMHG | WEIGHT: 171.4 LBS | SYSTOLIC BLOOD PRESSURE: 122 MMHG

## 2019-11-16 DIAGNOSIS — J45.41 MODERATE PERSISTENT ASTHMA WITH ACUTE EXACERBATION: ICD-10-CM

## 2019-11-16 DIAGNOSIS — A08.4 VIRAL GASTROENTERITIS: Primary | ICD-10-CM

## 2019-11-16 DIAGNOSIS — B34.9 VIRAL ILLNESS: ICD-10-CM

## 2019-11-16 PROCEDURE — 99213 OFFICE O/P EST LOW 20 MIN: CPT | Performed by: FAMILY MEDICINE

## 2019-11-16 RX ORDER — ONDANSETRON 4 MG/1
4 TABLET, FILM COATED ORAL EVERY 8 HOURS PRN
Qty: 20 TABLET | Refills: 0 | Status: SHIPPED | OUTPATIENT
Start: 2019-11-16 | End: 2020-03-12 | Stop reason: SDUPTHER

## 2019-11-16 RX ORDER — FLUTICASONE FUROATE AND VILANTEROL 200; 25 UG/1; UG/1
1 POWDER RESPIRATORY (INHALATION) DAILY
Qty: 60 EACH | Refills: 0 | Status: SHIPPED | OUTPATIENT
Start: 2019-11-16 | End: 2020-01-18 | Stop reason: SDUPTHER

## 2019-11-16 RX ORDER — ALBUTEROL SULFATE 90 UG/1
2 AEROSOL, METERED RESPIRATORY (INHALATION) EVERY 4 HOURS PRN
Qty: 1 INHALER | Refills: 5 | Status: SHIPPED | OUTPATIENT
Start: 2019-11-16 | End: 2020-01-18 | Stop reason: SDUPTHER

## 2019-11-16 RX ORDER — ALBUTEROL SULFATE 2.5 MG/3ML
2.5 SOLUTION RESPIRATORY (INHALATION) EVERY 6 HOURS PRN
Qty: 120 VIAL | Refills: 6 | Status: SHIPPED | OUTPATIENT
Start: 2019-11-16 | End: 2021-02-09 | Stop reason: SDUPTHER

## 2019-11-16 NOTE — PROGRESS NOTES
Assessment/Plan:       Diagnoses and all orders for this visit:    Viral gastroenteritis  -     ondansetron (ZOFRAN) 4 mg tablet; Take 1 tablet (4 mg total) by mouth every 8 (eight) hours as needed for nausea or vomiting  - Counseled on BRAT diet and hydration    - Return if symptoms worsen or do not improve  Moderate persistent asthma with acute exacerbation  -     albuterol (2 5 mg/3 mL) 0 083 % nebulizer solution; Take 1 vial (2 5 mg total) by nebulization every 6 (six) hours as needed for wheezing or shortness of breath  -     fluticasone-vilanterol (BREO ELLIPTA) 200-25 MCG/INH inhaler; Inhale 1 puff daily Rinse mouth after use  -     albuterol (VENTOLIN HFA) 90 mcg/act inhaler; Inhale 2 puffs every 4 (four) hours as needed for wheezing    Viral illness        Subjective:      Patient ID: Gerry Villela is a 21 y o  male  HPI   Zita Huggins presents today for evaluation of worsening asthma symptoms as well as viral gastroenteritis  Pt has had asthma since childhood and has been on albuterol rescue inhaler PRN, albuterol nebs PRN, and breo ellipta  States his asthma is usually well controlled, but he ran out of his breo and neb solution 2 weeks ago and with the weather change, his asthma has been worsening  He has intermittent SOB and wheezing- symptoms are worse at night  Has been using his rescue inhaler 3-4 times/day  Pt also states that for the past few days he has had nausea and diarrhea (2-3 non bloody, non watery BM/day)  States other people at work have similar symptoms  He did eat fruit that was left out at his work that multiple people touched  Admits to some mild abdominal pain and loss of appetite  Denies fevers, chills, vomiting, constipation, diarrhea         The following portions of the patient's history were reviewed and updated as appropriate: allergies, current medications, past family history, past medical history, past social history, past surgical history and problem list     Review of Systems   Constitutional: Negative for activity change, appetite change, chills, diaphoresis, fatigue and fever  HENT: Negative for congestion, postnasal drip, rhinorrhea, sinus pressure, sneezing and sore throat  Eyes: Negative  Respiratory: Positive for cough, shortness of breath and wheezing  Negative for choking and chest tightness  Cardiovascular: Negative for chest pain and leg swelling  Gastrointestinal: Positive for abdominal pain, diarrhea and nausea  Negative for abdominal distention, anal bleeding, blood in stool, constipation and vomiting  Genitourinary: Negative  Musculoskeletal: Negative for arthralgias, gait problem and myalgias  Skin: Negative for color change, pallor, rash and wound  Neurological: Negative for dizziness, tremors, syncope, weakness, light-headedness, numbness and headaches  Psychiatric/Behavioral: Negative  Objective:      /76   Pulse 83   Temp (!) 97 4 °F (36 3 °C)   Resp 20   Ht 5' 7" (1 702 m)   Wt 77 7 kg (171 lb 6 4 oz)   SpO2 99%   BMI 26 85 kg/m²          Physical Exam   Constitutional: He is oriented to person, place, and time  He appears well-developed and well-nourished  No distress  HENT:   Head: Normocephalic and atraumatic  Right Ear: External ear normal    Left Ear: External ear normal    Nose: Nose normal    Mouth/Throat: Oropharynx is clear and moist  No oropharyngeal exudate  Eyes: Pupils are equal, round, and reactive to light  Conjunctivae are normal  Right eye exhibits no discharge  Left eye exhibits no discharge  No scleral icterus  Neck: Normal range of motion  Neck supple  Cardiovascular: Normal rate, regular rhythm, normal heart sounds and intact distal pulses  Exam reveals no gallop and no friction rub  No murmur heard  Pulmonary/Chest: Effort normal and breath sounds normal  No respiratory distress  He has no wheezes  He has no rales  He exhibits no tenderness  Abdominal: Soft   Bowel sounds are normal  He exhibits no distension and no mass  There is tenderness (lower quadrants bilaterally)  There is no rebound and no guarding  Musculoskeletal: Normal range of motion  He exhibits no edema, tenderness or deformity  Neurological: He is alert and oriented to person, place, and time  He displays normal reflexes  He exhibits normal muscle tone  Coordination normal    Skin: Skin is warm and dry  No rash noted  He is not diaphoretic  No erythema  No pallor  Psychiatric: He has a normal mood and affect   His behavior is normal  Judgment and thought content normal

## 2019-11-16 NOTE — LETTER
November 16, 2019     Patient: Neema Warren   YOB: 1995   Date of Visit: 11/16/2019       To Whom it May Concern:    Neema Warren is under my professional care  He was seen in my office on 11/16/2019  He may return to work on 11/17/2019  If you have any questions or concerns, please don't hesitate to call           Sincerely,          Marcella Carter MD        CC: No Recipients

## 2019-12-22 ENCOUNTER — APPOINTMENT (OUTPATIENT)
Dept: URGENT CARE | Facility: CLINIC | Age: 24
End: 2019-12-22
Payer: OTHER MISCELLANEOUS

## 2019-12-22 ENCOUNTER — HOSPITAL ENCOUNTER (EMERGENCY)
Facility: HOSPITAL | Age: 24
Discharge: HOME | End: 2019-12-22
Attending: EMERGENCY MEDICINE | Admitting: EMERGENCY MEDICINE
Payer: OTHER MISCELLANEOUS

## 2019-12-22 VITALS
SYSTOLIC BLOOD PRESSURE: 119 MMHG | TEMPERATURE: 97.8 F | WEIGHT: 170 LBS | HEIGHT: 68 IN | DIASTOLIC BLOOD PRESSURE: 56 MMHG | HEART RATE: 76 BPM | RESPIRATION RATE: 20 BRPM | OXYGEN SATURATION: 99 % | BODY MASS INDEX: 25.76 KG/M2

## 2019-12-22 DIAGNOSIS — Z77.098 CHEMICAL EXPOSURE OF EYE: Primary | ICD-10-CM

## 2019-12-22 PROCEDURE — G0382 LEV 3 HOSP TYPE B ED VISIT: HCPCS

## 2019-12-22 PROCEDURE — 99283 EMERGENCY DEPT VISIT LOW MDM: CPT

## 2019-12-22 PROCEDURE — 99283 EMERGENCY DEPT VISIT LOW MDM: CPT | Performed by: PHYSICIAN ASSISTANT

## 2019-12-22 RX ORDER — TETRACAINE HYDROCHLORIDE 5 MG/ML
2 SOLUTION OPHTHALMIC ONCE
Status: COMPLETED | OUTPATIENT
Start: 2019-12-22 | End: 2019-12-22

## 2019-12-22 RX ADMIN — TETRACAINE HYDROCHLORIDE 2 DROP: 5 SOLUTION OPHTHALMIC at 12:06

## 2019-12-22 RX ADMIN — FLUORESCEIN SODIUM 1 STRIP: 1 STRIP OPHTHALMIC at 12:10

## 2019-12-22 NOTE — ED PROVIDER NOTES
History  Chief Complaint   Patient presents with    Eye Pain     Pt "I got some beach splashed into my eye around 0900  My left eye has been burning a little bit more than my right "      Patient is a 53-year-old male presenting to the emergency department for evaluation of chemical exposure to eyes  Pt states PTA while at work he had bleach splashed into bilateral eyes even though he was wearing glasses  Pt states he initially had burning sensation and blurred vision  Pt states he used the eye wash station at his work for about 10-15 minutes right after splash occurred  Patient states his vision improved and no longer has blurred vision  Pt normally wears glasses, no contacts  Pt went to Urgent Care after incident and they referred pt to the ED  Pt without headache, facial burns, eye redness, vomiting, SOB, chest pain, difficulty swallowing, difficulty breathing  Prior to Admission Medications   Prescriptions Last Dose Informant Patient Reported? Taking? albuterol (2 5 mg/3 mL) 0 083 % nebulizer solution   No Yes   Sig: Take 1 vial (2 5 mg total) by nebulization every 6 (six) hours as needed for wheezing or shortness of breath   albuterol (VENTOLIN HFA) 90 mcg/act inhaler   No Yes   Sig: Inhale 2 puffs every 4 (four) hours as needed for wheezing   cyclobenzaprine (FLEXERIL) 5 mg tablet Not Taking at Unknown time  No No   Si-2 PO TID PRN   Patient not taking: Reported on 2019   docusate sodium (COLACE) 100 mg capsule Not Taking at Unknown time  No No   Sig: Take 1 capsule (100 mg total) by mouth 2 (two) times a day   Patient not taking: Reported on 2019   fluticasone (FLONASE) 50 mcg/act nasal spray Not Taking at Unknown time  No No   Si sprays into each nostril daily   Patient not taking: Reported on 2019   fluticasone-vilanterol (BREO ELLIPTA) 200-25 MCG/INH inhaler 2019 at Unknown time  No Yes   Sig: Inhale 1 puff daily Rinse mouth after use     methylprednisolone (MEDROL) 4 mg tablet Not Taking at Unknown time  No No   Si tb po on day 1; 5 tb po on day 2; 4 tb po on day 3; 3 tb po on day 4; 2 tb po on day 5; 1 tb po on day 6   Patient not taking: Reported on 2019   ondansetron (ZOFRAN) 4 mg tablet Not Taking at Unknown time  No No   Sig: Take 1 tablet (4 mg total) by mouth every 8 (eight) hours as needed for nausea or vomiting   Patient not taking: Reported on 2019   ondansetron (ZOFRAN) 4 mg tablet Past Week at Unknown time  No Yes   Sig: Take 1 tablet (4 mg total) by mouth every 8 (eight) hours as needed for nausea or vomiting      Facility-Administered Medications: None       Past Medical History:   Diagnosis Date    Asthma        Past Surgical History:   Procedure Laterality Date    WRIST ARTHROTOMY         Family History   Problem Relation Age of Onset    Hypertension Mother     Alcohol abuse Father     Other Father         Tobacco abuse     I have reviewed and agree with the history as documented  Social History     Tobacco Use    Smoking status: Never Smoker    Smokeless tobacco: Never Used   Substance Use Topics    Alcohol use: Yes     Comment: occ    Drug use: No        Review of Systems   Eyes: Positive for pain (chemical exposure)  Negative for visual disturbance  All other systems reviewed and are negative  Physical Exam  Physical Exam   Constitutional: He is oriented to person, place, and time  He appears well-developed and well-nourished  HENT:   Head: Normocephalic and atraumatic  Nose: Nose normal    Mouth/Throat: Uvula is midline, oropharynx is clear and moist and mucous membranes are normal  No posterior oropharyngeal edema  Tonsils are 1+ on the right  Tonsils are 1+ on the left  Eyes: Pupils are equal, round, and reactive to light  Conjunctivae and EOM are normal    Fundoscopic exam:       The right eye shows no AV nicking, no exudate, no hemorrhage and no papilledema  The right eye shows red reflex          The left eye shows no AV nicking, no exudate, no hemorrhage and no papilledema  The left eye shows red reflex  Slit lamp exam:       The right eye shows no corneal abrasion, no fluorescein uptake and no anterior chamber bulge  The left eye shows no corneal abrasion, no fluorescein uptake and no anterior chamber bulge  pH 7 0 - right eye  pH 7 0 - left eye  No evidence of corneal abrasion or fluorescein uptake  Eye pressure  Left - 18  Right - 17  Visual acuity performed by nursing staff - NML   Neck: Normal range of motion  Cardiovascular: Normal rate and regular rhythm  Pulmonary/Chest: Effort normal and breath sounds normal    Musculoskeletal: Normal range of motion  Neurological: He is alert and oriented to person, place, and time  Skin: Skin is warm and dry  Psychiatric: He has a normal mood and affect  His behavior is normal        Vital Signs  ED Triage Vitals [12/22/19 1134]   Temperature Pulse Respirations Blood Pressure SpO2   97 5 °F (36 4 °C) 72 18 140/63 99 %      Temp Source Heart Rate Source Patient Position - Orthostatic VS BP Location FiO2 (%)   Oral Monitor Lying Right arm --      Pain Score       No Pain           Vitals:    12/22/19 1134 12/22/19 1204   BP: 140/63 119/56   Pulse: 72 76   Patient Position - Orthostatic VS: Lying Sitting         Visual Acuity  Visual Acuity      Most Recent Value   Visual acuity R eye is  20/20   Visual acuity Left eye is  20/20   Visual acuity in both eyes is  Other [20/18]   Wearing corrective eyewear/lenses?   Yes   L Pupil Size (mm)  3   R Pupil Size (mm)  3   L Pupil Shape  Round   R Pupil Shape  Round          ED Medications  Medications   tetracaine 0 5 % ophthalmic solution 2 drop (2 drops Both Eyes Given by Other 12/22/19 1206)   fluorescein sodium sterile ophthalmic strip 1 strip (1 strip Both Eyes Given 12/22/19 1210)       Diagnostic Studies  Results Reviewed     None                 No orders to display              Procedures  Procedures ED Course  ED Course as of Dec 22 1425   Sun Dec 22, 2019   1230 Eye pressures:  Left 18  Right 17                                  MDM  Number of Diagnoses or Management Options  Chemical exposure of eye: new and does not require workup  Diagnosis management comments: Patient is a 79-year-old male presenting to the emergency department for evaluation of chemical exposure to eyes  Pt states PTA while at work he had bleach splashed into bilateral eyes even though he was wearing glasses  Pt states he initially had burning sensation and blurred vision  Pt states he used the eye wash station at his work for about 10-15 minutes right after splash occurred  Patient states his vision improved and no longer has blurred vision  Pt normally wears glasses, no contacts  Visual acuity performed on arrival to ED which was at patient's baseline  pH 7 0 - right eye  pH 7 0 - left eye  No evidence of corneal abrasion or fluorescein uptake  Eye pressure  Left - 18  Right - 17  Given patient already flushed eyes for 10-15 minutes right after incident PTA, and normal pH in b/l eyes as well as normal pressure and no evidence of corneal abrasion or fluorescein uptake and no vision changes, will discharge patient with Ophthalmology f/u as outpatient  Information given to patient for [facility] and advised to make appointment tomorrow for re-evaluation  Patient also without any facial burns or difficulty breathing  Oxygen saturation 99% on RA in ED  Strict return precautions given to patient  Pt verbalizes understanding and agrees with plan  The management plan was discussed in detail with the patient at bedside and all questions were answered  Prior to discharge, I provided both verbal and written instructions  I discussed with the patient the signs and symptoms for which to return to the emergency department  All questions were answered and patient was comfortable with the plan of care and discharged to home   The patient verbalized understanding of our discussion and plan of care, and agrees to return to the Emergency Department for concerns and progression of illness  Disposition  Final diagnoses:   Chemical exposure of eye     Time reflects when diagnosis was documented in both MDM as applicable and the Disposition within this note     Time User Action Codes Description Comment    12/22/2019 12:16 PM Yuniel Rendon Add [V90 197] Chemical exposure of eye       ED Disposition     ED Disposition Condition Date/Time Comment    Discharge Stable Sun Dec 22, 2019 12:15 PM Neema Warren discharge to home/self care              Follow-up Information     Follow up With Specialties Details [facility] Schedule an appointment as soon as possible for a visit   [facility] [address]  [phone]            Discharge Medication List as of 12/22/2019 12:30 PM      CONTINUE these medications which have NOT CHANGED    Details   albuterol (2 5 mg/3 mL) 0 083 % nebulizer solution Take 1 vial (2 5 mg total) by nebulization every 6 (six) hours as needed for wheezing or shortness of breath, Starting Sat 11/16/2019, Normal      albuterol (VENTOLIN HFA) 90 mcg/act inhaler Inhale 2 puffs every 4 (four) hours as needed for wheezing, Starting Sat 11/16/2019, Normal      cyclobenzaprine (FLEXERIL) 5 mg tablet 1-2 PO TID PRN, Print      docusate sodium (COLACE) 100 mg capsule Take 1 capsule (100 mg total) by mouth 2 (two) times a day, Starting Tue 8/21/2018, Normal      fluticasone (FLONASE) 50 mcg/act nasal spray 2 sprays into each nostril daily, Starting Mon 6/4/2018, Normal      fluticasone-vilanterol (BREO ELLIPTA) 200-25 MCG/INH inhaler Inhale 1 puff daily Rinse mouth after use , Starting Sat 11/16/2019, Normal      methylprednisolone (MEDROL) 4 mg tablet 6 tb po on day 1; 5 tb po on day 2; 4 tb po on day 3; 3 tb po on day 4; 2 tb po on day 5; 1 tb po on day 6, Print !! ondansetron (ZOFRAN) 4 mg tablet Take 1 tablet (4 mg total) by mouth every 8 (eight) hours as needed for nausea or vomiting, Starting Sat 4/13/2019, Normal      !! ondansetron (ZOFRAN) 4 mg tablet Take 1 tablet (4 mg total) by mouth every 8 (eight) hours as needed for nausea or vomiting, Starting Sat 11/16/2019, Normal       !! - Potential duplicate medications found  Please discuss with provider  No discharge procedures on file      ED Provider  Electronically Signed by           Carlota Corona PA-C  12/22/19 7678

## 2019-12-23 ENCOUNTER — VBI (OUTPATIENT)
Dept: FAMILY MEDICINE CLINIC | Facility: CLINIC | Age: 24
End: 2019-12-23

## 2019-12-23 NOTE — TELEPHONE ENCOUNTER
Nabor Nichols    ED Visit Information     Ed visit date: 12/22/2019  Diagnosis Description: Chemical exposure of eye  In Network? Yes [facility]  Discharge status: Home  Discharged with meds ? No  Number of ED visits to date: 0  ED Severity:NA     Outreach Information    Outreach successful: Yes 2  Date letter mailed:12/24/2019  Date Finalized:12/24/2019    Care Coordination    Follow up appointment with pcp: no no  Transportation issues ?  NA    Value Base Outreach    12/23/2019 2:00 PM - [facility]  12/24/2019 11:22 AM

## 2019-12-23 NOTE — LETTER
[facility]  [address]  [address]  [place] [address]    Date: 12/24/19         [place]  [address] [place] [address]    Dear Armaan Hurley: Thank you for choosing [facility] for care  As your primary care provider we want to make sure that your ongoing medical care is being addressed  If you require follow up care as a result of your emergency department visit, there are a few things we would like you to know  As part of our continuing commitment to caring for our patient, we have added more same day appointments and have extended our office hours to meet your medical needs  After hours, on-call physicians are available via our main office line  We encourage you to contact our office prior to seeking treatment to discuss your symptoms with our medical staff  Together, we can determine the correct course of action  A majority of non-emergent conditions such as: common cold, flu-like symptoms, fevers, strains/sprains, dislocations, minor burns, cuts and animal bites can be treated at [address] facilities  Diagnostic testing is available at some sites  Of course, if you are experiencing a life threatening medical emergency call 911 or proceed directly to the nearest emergency room      SKIP THE WAIT  Conveniently offered at most [facility] locations  [employer] your spot online at [email or web address] or on the [facility] [address]    Sincerely,          [facility]  Dept: [phone]

## 2019-12-24 ENCOUNTER — APPOINTMENT (OUTPATIENT)
Dept: URGENT CARE | Facility: CLINIC | Age: 24
End: 2019-12-24
Payer: OTHER MISCELLANEOUS

## 2019-12-24 PROCEDURE — 99214 OFFICE O/P EST MOD 30 MIN: CPT | Performed by: PHYSICIAN ASSISTANT

## 2020-01-18 ENCOUNTER — OFFICE VISIT (OUTPATIENT)
Dept: FAMILY MEDICINE CLINIC | Facility: CLINIC | Age: 25
End: 2020-01-18
Payer: COMMERCIAL

## 2020-01-18 VITALS
OXYGEN SATURATION: 97 % | HEIGHT: 68 IN | BODY MASS INDEX: 25.76 KG/M2 | WEIGHT: 170 LBS | HEART RATE: 75 BPM | SYSTOLIC BLOOD PRESSURE: 140 MMHG | DIASTOLIC BLOOD PRESSURE: 90 MMHG | TEMPERATURE: 97.4 F | RESPIRATION RATE: 16 BRPM

## 2020-01-18 DIAGNOSIS — Z13.6 SCREENING FOR CARDIOVASCULAR CONDITION: ICD-10-CM

## 2020-01-18 DIAGNOSIS — Z11.4 SCREENING FOR HIV (HUMAN IMMUNODEFICIENCY VIRUS): ICD-10-CM

## 2020-01-18 DIAGNOSIS — R10.13 DYSPEPSIA: ICD-10-CM

## 2020-01-18 DIAGNOSIS — Z13.0 SCREENING FOR DEFICIENCY ANEMIA: ICD-10-CM

## 2020-01-18 DIAGNOSIS — J45.41 MODERATE PERSISTENT ASTHMA WITH ACUTE EXACERBATION: ICD-10-CM

## 2020-01-18 DIAGNOSIS — R19.7 DIARRHEA, UNSPECIFIED TYPE: ICD-10-CM

## 2020-01-18 DIAGNOSIS — J45.21 MILD INTERMITTENT ASTHMA WITH ACUTE EXACERBATION: Primary | ICD-10-CM

## 2020-01-18 PROCEDURE — 3008F BODY MASS INDEX DOCD: CPT | Performed by: FAMILY MEDICINE

## 2020-01-18 PROCEDURE — 99213 OFFICE O/P EST LOW 20 MIN: CPT | Performed by: FAMILY MEDICINE

## 2020-01-18 PROCEDURE — 1036F TOBACCO NON-USER: CPT | Performed by: FAMILY MEDICINE

## 2020-01-18 RX ORDER — FLUTICASONE FUROATE AND VILANTEROL 200; 25 UG/1; UG/1
1 POWDER RESPIRATORY (INHALATION) DAILY
Qty: 60 EACH | Refills: 2 | Status: SHIPPED | OUTPATIENT
Start: 2020-01-18 | End: 2020-05-04

## 2020-01-18 RX ORDER — PREDNISONE 10 MG/1
TABLET ORAL
Qty: 40 TABLET | Refills: 0 | Status: SHIPPED | OUTPATIENT
Start: 2020-01-18 | End: 2020-02-25 | Stop reason: ALTCHOICE

## 2020-01-18 RX ORDER — ALBUTEROL SULFATE 90 UG/1
2 AEROSOL, METERED RESPIRATORY (INHALATION) EVERY 4 HOURS PRN
Qty: 1 INHALER | Refills: 5 | Status: SHIPPED | OUTPATIENT
Start: 2020-01-18 | End: 2021-02-09 | Stop reason: SDUPTHER

## 2020-01-18 RX ORDER — OMEPRAZOLE 40 MG/1
40 CAPSULE, DELAYED RELEASE ORAL DAILY
Qty: 30 CAPSULE | Refills: 1 | Status: SHIPPED | OUTPATIENT
Start: 2020-01-18 | End: 2020-03-12 | Stop reason: SDUPTHER

## 2020-01-18 NOTE — PROGRESS NOTES
Assessment/Plan:    1  Mild intermittent asthma with acute exacerbation  Comments:  currently flaring, will try to avoid antibiotics due to recent diarrhea  Orders:  -     predniSONE 10 mg tablet; Take 4 daily for 4 days, then 3 daily for 4 days, then 2 daily for 4 days, then 1 daily for 4 days  Take with food  2  Dyspepsia  Comments:  flaring, advised to stop Zantac due to recall  Orders:  -     omeprazole (PriLOSEC) 40 MG capsule; Take 1 capsule (40 mg total) by mouth daily    3  Diarrhea, unspecified type  -     TSH, 3rd generation; Future  -     Celiac Disease Panel; Future    4  Screening for HIV (human immunodeficiency virus)  -     [facility]- Human Immunodeficiency Virus 1/2 Antigen / Antibody ( Fourth Generation) with Reflex Testing; Future    5  Screening for cardiovascular condition  -     Comprehensive metabolic panel; Future  -     Lipid Panel with Direct LDL reflex; Future    6  Screening for deficiency anemia  -     CBC; Future    7  Moderate persistent asthma with acute exacerbation  -     fluticasone-vilanterol (BREO ELLIPTA) 200-25 MCG/INH inhaler; Inhale 1 puff daily Rinse mouth after use  -     albuterol (VENTOLIN HFA) 90 mcg/act inhaler; Inhale 2 puffs every 4 (four) hours as needed for wheezing           There are no Patient Instructions on file for this visit  Return in about 1 month (around 2/18/2020) for Annual physical wit Dr Alex Castorena  Subjective:      Patient ID: Kristie Kennedy is a 21 y o  male  Chief Complaint   Patient presents with    Cold Like Symptoms     chills-wmcma    Cough     congestion    Fatigue    Headache    Asthma       He has been sick for the past week  He has severe persistent cough for the past 2 days  He has not had any fevers  He has been taking Theraflu  He has had stomach issues for years  He has gone to the ER in the past for it  He couldn't eat last week for days  Everything he ate he vomited up    He was also having diarrhea  He is now starting to eat solids again 3 days ago  He still has some diarrhea  The following portions of the patient's history were reviewed and updated as appropriate:  past social history    Review of Systems   Constitutional: Negative for fever  Respiratory: Positive for cough and wheezing  Current Outpatient Medications   Medication Sig Dispense Refill    albuterol (2 5 mg/3 mL) 0 083 % nebulizer solution Take 1 vial (2 5 mg total) by nebulization every 6 (six) hours as needed for wheezing or shortness of breath 120 vial 6    albuterol (VENTOLIN HFA) 90 mcg/act inhaler Inhale 2 puffs every 4 (four) hours as needed for wheezing 1 Inhaler 5    fluticasone-vilanterol (BREO ELLIPTA) 200-25 MCG/INH inhaler Inhale 1 puff daily Rinse mouth after use  60 each 2    ondansetron (ZOFRAN) 4 mg tablet Take 1 tablet (4 mg total) by mouth every 8 (eight) hours as needed for nausea or vomiting 20 tablet 0    omeprazole (PriLOSEC) 40 MG capsule Take 1 capsule (40 mg total) by mouth daily 30 capsule 1    predniSONE 10 mg tablet Take 4 daily for 4 days, then 3 daily for 4 days, then 2 daily for 4 days, then 1 daily for 4 days  Take with food  40 tablet 0     No current facility-administered medications for this visit  Objective:    /90   Pulse 75   Temp (!) 97 4 °F (36 3 °C)   Resp 16   Ht 5' 8" (1 727 m)   Wt 77 1 kg (170 lb)   SpO2 97%   BMI 25 85 kg/m²      Physical Exam   Constitutional: He appears well-developed and well-nourished  HENT:   Head: Normocephalic and atraumatic  Right Ear: External ear normal    Left Ear: External ear normal    Mouth/Throat: Oropharynx is clear and moist    Cardiovascular: Normal rate, regular rhythm and normal heart sounds  No murmur heard  Pulmonary/Chest: Effort normal and breath sounds normal  No respiratory distress  He has no wheezes  He has no rales  Musculoskeletal: He exhibits no edema or tenderness     Nursing note and vitals reviewed            Jennifer Vallecillo, DO

## 2020-01-18 NOTE — LETTER
January 18, 2020     Patient: Natalio Verde   YOB: 1995   Date of Visit: 1/18/2020       To Whom it May Concern:    Natalio Verde is under my professional care  He was seen in my office on 1/18/2020  Please excuse from work 1/18/2020  If you have any questions or concerns, please don't hesitate to call           Sincerely,          Mukul Garsia DO        CC: No Recipients

## 2020-02-25 ENCOUNTER — OFFICE VISIT (OUTPATIENT)
Dept: GASTROENTEROLOGY | Facility: CLINIC | Age: 25
End: 2020-02-25
Payer: COMMERCIAL

## 2020-02-25 ENCOUNTER — APPOINTMENT (OUTPATIENT)
Dept: LAB | Facility: CLINIC | Age: 25
End: 2020-02-25
Payer: COMMERCIAL

## 2020-02-25 VITALS
WEIGHT: 168 LBS | HEART RATE: 83 BPM | TEMPERATURE: 98.8 F | BODY MASS INDEX: 25.46 KG/M2 | SYSTOLIC BLOOD PRESSURE: 128 MMHG | DIASTOLIC BLOOD PRESSURE: 82 MMHG | HEIGHT: 68 IN

## 2020-02-25 DIAGNOSIS — R10.84 GENERALIZED ABDOMINAL PAIN: ICD-10-CM

## 2020-02-25 DIAGNOSIS — Z13.6 SCREENING FOR CARDIOVASCULAR CONDITION: ICD-10-CM

## 2020-02-25 DIAGNOSIS — R19.4 CHANGE IN BOWEL HABITS: ICD-10-CM

## 2020-02-25 DIAGNOSIS — R19.7 DIARRHEA, UNSPECIFIED TYPE: ICD-10-CM

## 2020-02-25 DIAGNOSIS — Z13.0 SCREENING FOR DEFICIENCY ANEMIA: ICD-10-CM

## 2020-02-25 DIAGNOSIS — R10.84 GENERALIZED ABDOMINAL PAIN: Primary | ICD-10-CM

## 2020-02-25 LAB
ALBUMIN SERPL BCP-MCNC: 4.3 G/DL (ref 3.5–5)
ALP SERPL-CCNC: 82 U/L (ref 46–116)
ALT SERPL W P-5'-P-CCNC: 41 U/L (ref 12–78)
ANION GAP SERPL CALCULATED.3IONS-SCNC: 3 MMOL/L (ref 4–13)
AST SERPL W P-5'-P-CCNC: 19 U/L (ref 5–45)
BILIRUB SERPL-MCNC: 1.4 MG/DL (ref 0.2–1)
BUN SERPL-MCNC: 13 MG/DL (ref 5–25)
CALCIUM SERPL-MCNC: 9.2 MG/DL (ref 8.3–10.1)
CHLORIDE SERPL-SCNC: 106 MMOL/L (ref 100–108)
CO2 SERPL-SCNC: 32 MMOL/L (ref 21–32)
CREAT SERPL-MCNC: 1.05 MG/DL (ref 0.6–1.3)
ERYTHROCYTE [DISTWIDTH] IN BLOOD BY AUTOMATED COUNT: 12.8 % (ref 11.6–15.1)
GFR SERPL CREATININE-BSD FRML MDRD: 98 ML/MIN/1.73SQ M
GLUCOSE SERPL-MCNC: 59 MG/DL (ref 65–140)
HCT VFR BLD AUTO: 46.8 % (ref 36.5–49.3)
HGB BLD-MCNC: 15.4 G/DL (ref 12–17)
MCH RBC QN AUTO: 29.7 PG (ref 26.8–34.3)
MCHC RBC AUTO-ENTMCNC: 32.9 G/DL (ref 31.4–37.4)
MCV RBC AUTO: 90 FL (ref 82–98)
PLATELET # BLD AUTO: 333 THOUSANDS/UL (ref 149–390)
PMV BLD AUTO: 10.9 FL (ref 8.9–12.7)
POTASSIUM SERPL-SCNC: 3.8 MMOL/L (ref 3.5–5.3)
PROT SERPL-MCNC: 7.6 G/DL (ref 6.4–8.2)
RBC # BLD AUTO: 5.18 MILLION/UL (ref 3.88–5.62)
SODIUM SERPL-SCNC: 141 MMOL/L (ref 136–145)
TSH SERPL DL<=0.05 MIU/L-ACNC: 1.38 UIU/ML (ref 0.36–3.74)
WBC # BLD AUTO: 6.22 THOUSAND/UL (ref 4.31–10.16)

## 2020-02-25 PROCEDURE — 86003 ALLG SPEC IGE CRUDE XTRC EA: CPT

## 2020-02-25 PROCEDURE — 87389 HIV-1 AG W/HIV-1&-2 AB AG IA: CPT

## 2020-02-25 PROCEDURE — 99244 OFF/OP CNSLTJ NEW/EST MOD 40: CPT | Performed by: INTERNAL MEDICINE

## 2020-02-25 PROCEDURE — 85027 COMPLETE CBC AUTOMATED: CPT

## 2020-02-25 PROCEDURE — 82784 ASSAY IGA/IGD/IGG/IGM EACH: CPT

## 2020-02-25 PROCEDURE — 80053 COMPREHEN METABOLIC PANEL: CPT

## 2020-02-25 PROCEDURE — 83516 IMMUNOASSAY NONANTIBODY: CPT

## 2020-02-25 PROCEDURE — 86008 ALLG SPEC IGE RECOMB EA: CPT

## 2020-02-25 PROCEDURE — 86255 FLUORESCENT ANTIBODY SCREEN: CPT

## 2020-02-25 PROCEDURE — 36415 COLL VENOUS BLD VENIPUNCTURE: CPT

## 2020-02-25 PROCEDURE — 84443 ASSAY THYROID STIM HORMONE: CPT

## 2020-02-25 PROCEDURE — 82785 ASSAY OF IGE: CPT

## 2020-02-25 RX ORDER — DICYCLOMINE HYDROCHLORIDE 10 MG/1
10 CAPSULE ORAL 3 TIMES DAILY PRN
Qty: 30 CAPSULE | Refills: 2 | Status: SHIPPED | OUTPATIENT
Start: 2020-02-25 | End: 2020-03-12

## 2020-02-25 NOTE — PROGRESS NOTES
Consultation - [facility]) [facility]  Rehana Markos 1995 male         Chief Complaint:  Abdominal pain    HPI:  57-year-old male with history of asthma reports having chronic GI issues issues for few years  Complaining about abdominal pain mostly in the stomach after eating associated with nausea and vomiting  He also complains about soft to loose bowel movements with cramping in the lower abdomen  He denies any blood or mucus in the stool  Sometimes he has problems with constipation also  Complaining about burning in the stomach with acid reflux  He took Zantac in the past without any help  He was started on Prilosec couple of weeks ago  He takes Excedrin couple of times a week for headaches    REVIEW OF SYSTEMS: Review of Systems   Constitutional: Negative for activity change, appetite change, chills, diaphoresis, fatigue, fever and unexpected weight change  HENT: Negative for ear discharge, ear pain, facial swelling, hearing loss, nosebleeds, sore throat, tinnitus and voice change  Eyes: Negative for pain, discharge, redness, itching and visual disturbance  Respiratory: Negative for apnea, cough, chest tightness, shortness of breath and wheezing  Cardiovascular: Negative for chest pain and palpitations  Gastrointestinal:        As noted in HPI   Endocrine: Negative for cold intolerance, heat intolerance and polyuria  Genitourinary: Negative for difficulty urinating, dysuria, flank pain, hematuria and urgency  Musculoskeletal: Negative for arthralgias, back pain, gait problem, joint swelling and myalgias  Skin: Negative for rash and wound  Neurological: Negative for dizziness, tremors, seizures, speech difficulty, light-headedness, numbness and headaches  Hematological: Negative for adenopathy  Does not bruise/bleed easily  Psychiatric/Behavioral: Negative for agitation, behavioral problems and confusion  The patient is not nervous/anxious           Past Medical History:   Diagnosis Date    Asthma       Past Surgical History:   Procedure Laterality Date    WRIST ARTHROTOMY       Social History     Socioeconomic History    Marital status: Single     Spouse name: Not on file    Number of children: Not on file    Years of education: Not on file    Highest education level: Not on file   Occupational History    Not on file   Social Needs    Financial resource strain: Not on file    Food insecurity:     Worry: Not on file     Inability: Not on file    Transportation needs:     Medical: Not on file     Non-medical: Not on file   Tobacco Use    Smoking status: Never Smoker    Smokeless tobacco: Never Used   Substance and Sexual Activity    Alcohol use: Yes     Comment: occ    Drug use: Yes     Types: Marijuana    Sexual activity: Not on file   Lifestyle    Physical activity:     Days per week: Not on file     Minutes per session: Not on file    Stress: Not on file   Relationships    Social connections:     Talks on phone: Not on file     Gets together: Not on file     Attends Sabianist service: Not on file     Active member of club or organization: Not on file     Attends meetings of clubs or organizations: Not on file     Relationship status: Not on file    Intimate partner violence:     Fear of current or ex partner: Not on file     Emotionally abused: Not on file     Physically abused: Not on file     Forced sexual activity: Not on file   Other Topics Concern    Not on file   Social History Narrative    Single,     Travel to [facility]     Family History   Problem Relation Age of Onset    Hypertension Mother     Alcohol abuse Father     Other Father         Tobacco abuse     Patient has no known allergies    Current Outpatient Medications   Medication Sig Dispense Refill    albuterol (2 5 mg/3 mL) 0 083 % nebulizer solution Take 1 vial (2 5 mg total) by nebulization every 6 (six) hours as needed for wheezing or shortness of breath 120 vial 6    albuterol (VENTOLIN HFA) 90 mcg/act inhaler Inhale 2 puffs every 4 (four) hours as needed for wheezing 1 Inhaler 5    fluticasone-vilanterol (BREO ELLIPTA) 200-25 MCG/INH inhaler Inhale 1 puff daily Rinse mouth after use  60 each 2    omeprazole (PriLOSEC) 40 MG capsule Take 1 capsule (40 mg total) by mouth daily 30 capsule 1    ondansetron (ZOFRAN) 4 mg tablet Take 1 tablet (4 mg total) by mouth every 8 (eight) hours as needed for nausea or vomiting 20 tablet 0    dicyclomine (BENTYL) 10 mg capsule Take 1 capsule (10 mg total) by mouth 3 (three) times a day as needed (Abdominal pain) 30 capsule 2    Na Sulfate-K Sulfate-Mg Sulf (Suprep Bowel Prep Kit) 17 5-3 13-1 6 GM/177ML SOLN Take 2 Bottles by mouth see administration instructions Please follow the instructions from the office 2 Bottle 0     No current facility-administered medications for this visit  Blood pressure 128/82, pulse 83, temperature 98 8 °F (37 1 °C), height 5' 8" (1 727 m), weight 76 2 kg (168 lb)  PHYSICAL EXAM: Physical Exam   Constitutional: He is oriented to person, place, and time  He appears well-developed  HENT:   Head: Normocephalic and atraumatic  Mouth/Throat: Oropharynx is clear and moist    Eyes: Pupils are equal, round, and reactive to light  Conjunctivae are normal  Right eye exhibits no discharge  Left eye exhibits no discharge  No scleral icterus  Neck: Neck supple  No JVD present  No tracheal deviation present  No thyromegaly present  Cardiovascular: Normal rate, regular rhythm, normal heart sounds and intact distal pulses  Exam reveals no gallop and no friction rub  No murmur heard  Pulmonary/Chest: Effort normal and breath sounds normal  No respiratory distress  He has no wheezes  He has no rales  He exhibits no tenderness  Abdominal: Soft  Bowel sounds are normal  He exhibits no distension and no mass  There is no tenderness  There is no rebound and no guarding  No hernia     Musculoskeletal: He exhibits no edema    Lymphadenopathy:     He has no cervical adenopathy  Neurological: He is alert and oriented to person, place, and time  Skin: Skin is warm and dry  No rash noted  No erythema  Psychiatric: He has a normal mood and affect  His behavior is normal  Thought content normal         Lab Results   Component Value Date    WBC 8 30 06/09/2016    HGB 16 3 06/09/2016    HCT 50 0 06/09/2016    MCV 88 06/09/2016     06/09/2016     Lab Results   Component Value Date    CALCIUM 9 2 06/09/2016    K 4 7 08/21/2018    CO2 24 08/21/2018    CL 99 08/21/2018    BUN 12 08/21/2018    CREATININE 1 12 08/21/2018     Lab Results   Component Value Date    ALT 38 08/21/2018    AST 22 08/21/2018    ALKPHOS 84 06/09/2016     No results found for: INR, PROTIME    No results found  ASSESSMENT & PLAN:    Generalized abdominal pain  Symptoms appear to most likely functional from irritable bowel syndrome  Rule out other lesions including peptic ulcer disease or gastric erosions  Also rule out gluten sensitivity    -explained to patient in detail about different possible etiologies and given reassurance     -discussed about the pathophysiology of irritable bowel syndrome    -check celiac disease panel and food allergy profile    -try Bentyl as needed    -Schedule for both EGD and colonoscopy  -High-fiber diet     -Patient was given instructions about the colonoscopy prep     -Patient was explained about  the risks and benefits of the procedure  Risks including but not limited to bleeding, infection, perforation were explained in detail  Also explained about less than 100% sensitivity with the exam and other alternatives            Change in bowel habits  Appear to be from IBS    -schedule EGD and colonoscopy

## 2020-02-25 NOTE — ASSESSMENT & PLAN NOTE
Symptoms appear to most likely functional from irritable bowel syndrome  Rule out other lesions including peptic ulcer disease or gastric erosions  Also rule out gluten sensitivity    -explained to patient in detail about different possible etiologies and given reassurance     -discussed about the pathophysiology of irritable bowel syndrome    -check celiac disease panel and food allergy profile    -try Bentyl as needed    -Schedule for both EGD and colonoscopy  -High-fiber diet     -Patient was given instructions about the colonoscopy prep     -Patient was explained about  the risks and benefits of the procedure  Risks including but not limited to bleeding, infection, perforation were explained in detail  Also explained about less than 100% sensitivity with the exam and other alternatives

## 2020-02-25 NOTE — LETTER
[address] [place]  [address] [facility] [place] [address] [id number]  Dept: [phone]    February 25, 2020     Patient: Lottie Parr   YOB: 1995   Date of Visit: 2/25/2020       To Whom it May Concern:    Lottie Parr is under my professional care  He was seen in the office today 2/25/2020  Please excuse him from work 2/23/2020  He is able to return to work Wednesday 2/26/2020  He is scheduled for a procedure Tuesday 3/3/2020  If you have any questions or concerns, please don't hesitate to call           Sincerely,      Jonathan Mccullough MD

## 2020-02-26 LAB
A-LACTALB IGE QN: <0.1 KAU/I
ALLERGEN COMMENT: ABNORMAL
ALMOND IGE QN: 0.24 KUA/I
ARA H6 PEANUT: <0.1 KUA/I
B-LACTOGLOB IGE QN: <0.1 KAU/I
CASEIN IGE QN: <0.1 KAU/I
CASHEW NUT IGE QN: <0.1 KUA/I
CODFISH IGE QN: <0.1 KUA/I
EGG WHITE IGE QN: 0.27 KUA/I
GLUTEN IGE QN: <0.1 KUA/I
HAZELNUT IGE QN: 0.16 KUA/L
HIV 1+2 AB+HIV1 P24 AG SERPL QL IA: NORMAL
MILK IGE QN: 0.15 KUA/I
OVALB IGE QN: <0.1 KAU/I
OVOMUCOID IGE QN: <0.1 KAU/I
PEANUT (RARA H) 1 IGE QN: <0.1 KUA/I
PEANUT (RARA H) 2 IGE QN: <0.1 KUA/I
PEANUT (RARA H) 3 IGE QN: <0.1 KUA/I
PEANUT (RARA H) 8 IGE QN: <0.1 KUA/I
PEANUT (RARA H) 9 IGE QN: 0.48 KUA/I
PEANUT IGE QN: 0.44 KUA/I
SALMON IGE QN: 0.12 KUA/I
SCALLOP IGE QN: <0.1 KUA/L
SESAME SEED IGE QN: 0.23 KUA/I
SHRIMP IGE QN: 2.55 KUA/L
SOYBEAN IGE QN: <0.1 KUA/I
TOTAL IGE SMQN RAST: 410 KU/L (ref 0–113)
TUNA IGE QN: <0.1 KUA/I
WALNUT IGE QN: 0.47 KUA/I
WHEAT IGE QN: 0.25 KUA/I

## 2020-02-27 ENCOUNTER — TELEPHONE (OUTPATIENT)
Dept: FAMILY MEDICINE CLINIC | Facility: CLINIC | Age: 25
End: 2020-02-27

## 2020-02-27 LAB
ENDOMYSIUM IGA SER QL: NEGATIVE
GLIADIN PEPTIDE IGA SER-ACNC: 5 UNITS (ref 0–19)
GLIADIN PEPTIDE IGG SER-ACNC: 2 UNITS (ref 0–19)
IGA SERPL-MCNC: 390 MG/DL (ref 90–386)
TTG IGA SER-ACNC: <2 U/ML (ref 0–3)
TTG IGG SER-ACNC: <2 U/ML (ref 0–5)

## 2020-02-27 NOTE — TELEPHONE ENCOUNTER
2/27/2020 11:06 AM Called Tracie Nunez to discuss his lab results  Message left on his voice mail to call the office  Blood sugar was only 59  Thyroid level is normal     I did review his abnormal allergy testing that was done by Gastroenterology  I would like to make sure he is going to follow-up with an allergist regarding this      Layla Henderson, DO

## 2020-03-02 RX ORDER — SODIUM CHLORIDE, SODIUM LACTATE, POTASSIUM CHLORIDE, CALCIUM CHLORIDE 600; 310; 30; 20 MG/100ML; MG/100ML; MG/100ML; MG/100ML
75 INJECTION, SOLUTION INTRAVENOUS CONTINUOUS
Status: CANCELLED | OUTPATIENT
Start: 2020-03-02

## 2020-03-02 NOTE — PRE-PROCEDURE INSTRUCTIONS
Pre-Surgery Instructions:   Medication Instructions    albuterol (2 5 mg/3 mL) 0 083 % nebulizer solution Patient was instructed by Physician and understands   albuterol (VENTOLIN HFA) 90 mcg/act inhaler Patient was instructed by Physician and understands   dicyclomine (BENTYL) 10 mg capsule Patient was instructed by Physician and understands   fluticasone-vilanterol (BREO ELLIPTA) 200-25 MCG/INH inhaler Patient was instructed by Physician and understands   omeprazole (PriLOSEC) 40 MG capsule Patient was instructed by Physician and understands   ondansetron (ZOFRAN) 4 mg tablet Patient was instructed by Physician and understands

## 2020-03-03 ENCOUNTER — HOSPITAL ENCOUNTER (OUTPATIENT)
Dept: GASTROENTEROLOGY | Facility: AMBULARY SURGERY CENTER | Age: 25
Setting detail: OUTPATIENT SURGERY
Discharge: HOME | End: 2020-03-03
Attending: INTERNAL MEDICINE

## 2020-03-03 ENCOUNTER — TELEPHONE (OUTPATIENT)
Dept: GASTROENTEROLOGY | Facility: CLINIC | Age: 25
End: 2020-03-03

## 2020-03-12 ENCOUNTER — TELEPHONE (OUTPATIENT)
Dept: FAMILY MEDICINE CLINIC | Facility: CLINIC | Age: 25
End: 2020-03-12

## 2020-03-12 ENCOUNTER — OFFICE VISIT (OUTPATIENT)
Dept: FAMILY MEDICINE CLINIC | Facility: CLINIC | Age: 25
End: 2020-03-12
Payer: COMMERCIAL

## 2020-03-12 VITALS
HEART RATE: 80 BPM | HEIGHT: 68 IN | WEIGHT: 168 LBS | OXYGEN SATURATION: 97 % | RESPIRATION RATE: 16 BRPM | TEMPERATURE: 98 F | SYSTOLIC BLOOD PRESSURE: 120 MMHG | DIASTOLIC BLOOD PRESSURE: 80 MMHG | BODY MASS INDEX: 25.46 KG/M2

## 2020-03-12 DIAGNOSIS — A08.4 VIRAL GASTROENTERITIS: ICD-10-CM

## 2020-03-12 DIAGNOSIS — R10.13 DYSPEPSIA: ICD-10-CM

## 2020-03-12 DIAGNOSIS — K58.9 IRRITABLE BOWEL SYNDROME, UNSPECIFIED TYPE: ICD-10-CM

## 2020-03-12 DIAGNOSIS — S61.411A LACERATION OF RIGHT HAND WITHOUT FOREIGN BODY, INITIAL ENCOUNTER: Primary | ICD-10-CM

## 2020-03-12 PROCEDURE — 3008F BODY MASS INDEX DOCD: CPT | Performed by: FAMILY MEDICINE

## 2020-03-12 PROCEDURE — 99213 OFFICE O/P EST LOW 20 MIN: CPT | Performed by: FAMILY MEDICINE

## 2020-03-12 PROCEDURE — 1036F TOBACCO NON-USER: CPT | Performed by: FAMILY MEDICINE

## 2020-03-12 RX ORDER — OMEPRAZOLE 40 MG/1
40 CAPSULE, DELAYED RELEASE ORAL DAILY
Qty: 30 CAPSULE | Refills: 0 | Status: SHIPPED | OUTPATIENT
Start: 2020-03-12 | End: 2022-04-23

## 2020-03-12 RX ORDER — ONDANSETRON 4 MG/1
4 TABLET, FILM COATED ORAL EVERY 8 HOURS PRN
Qty: 20 TABLET | Refills: 0 | Status: SHIPPED | OUTPATIENT
Start: 2020-03-12 | End: 2020-05-05

## 2020-03-12 NOTE — PROGRESS NOTES
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  Pt was given the note for 7 days tille he is seen to remove his sutures at which point he can go back to full duty  Pt states ED did not give him a note      There are no Patient Instructions on file for this visit  Return for Next scheduled follow up  Subjective:      Patient ID: Amy Phillips is a 21 y o  male  Chief Complaint   Patient presents with    hand injury      [facility]       Pt unknown to me here for a same day appt to follow up his ED visit from yesterday  Pt states he works in a u[employer] and states he needs a note that state he can do light duty at work    [address]  [employer] he does not need a form to be filled out but a form that says light duty  Pt states 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    Pt also states while he is here he would like a refill iof his prilosec and his zofran      The following portions of the patient's history were reviewed and updated as appropriate: allergies, current medications, past family history, past medical history, past social history, past surgical history and problem list     Review of Systems   Constitutional: Negative for activity change, appetite change, chills, diaphoresis, fatigue, fever and unexpected weight change     HENT: Negative for congestion, dental problem, ear pain, mouth sores, sinus pressure, sinus pain, sore throat and trouble swallowing  Eyes: Negative for photophobia, discharge and itching  Respiratory: Negative for apnea, chest tightness and shortness of breath  Cardiovascular: Negative for chest pain, palpitations and leg swelling  Gastrointestinal: Negative for abdominal distention, abdominal pain, blood in stool, nausea and vomiting  Endocrine: Negative for cold intolerance, heat intolerance, polydipsia, polyphagia and polyuria  Genitourinary: Negative for difficulty urinating  Musculoskeletal: Negative for arthralgias  Skin: Positive for wound  Negative for color change  Neurological: Negative for dizziness, syncope, speech difficulty and headaches  Hematological: Negative for adenopathy  Psychiatric/Behavioral: Negative for agitation and behavioral problems  Current Outpatient Medications   Medication Sig Dispense Refill    albuterol (2 5 mg/3 mL) 0 083 % nebulizer solution Take 1 vial (2 5 mg total) by nebulization every 6 (six) hours as needed for wheezing or shortness of breath 120 vial 6    albuterol (VENTOLIN HFA) 90 mcg/act inhaler Inhale 2 puffs every 4 (four) hours as needed for wheezing 1 Inhaler 5    fluticasone-vilanterol (BREO ELLIPTA) 200-25 MCG/INH inhaler Inhale 1 puff daily Rinse mouth after use  60 each 2    omeprazole (PriLOSEC) 40 MG capsule Take 1 capsule (40 mg total) by mouth daily 30 capsule 0    ondansetron (ZOFRAN) 4 mg tablet Take 1 tablet (4 mg total) by mouth every 8 (eight) hours as needed for nausea or vomiting 20 tablet 0    dicyclomine (BENTYL) 10 mg capsule Take 10 mg by mouth       No current facility-administered medications for this visit          Objective:    /80   Pulse 80   Temp 98 °F (36 7 °C)   Resp 16   Ht 5' 8" (1 727 m)   Wt 76 2 kg (168 lb)   SpO2 97%   BMI 25 54 kg/m²        Physical Exam   Constitutional: He appears well-developed and well-nourished  No distress  HENT:   Head: Normocephalic and atraumatic  Right Ear: External ear normal    Left Ear: External ear normal    Nose: Nose normal    Mouth/Throat: Oropharynx is clear and moist  No oropharyngeal exudate  Eyes: Pupils are equal, round, and reactive to light  EOM are normal  Right eye exhibits no discharge  Left eye exhibits no discharge  No scleral icterus  Neck: No thyromegaly present  Cardiovascular: Normal rate and normal heart sounds  No murmur heard  Pulmonary/Chest: Effort normal and breath sounds normal  No respiratory distress  He has no wheezes  Abdominal: Soft  Bowel sounds are normal  He exhibits no distension and no mass  There is no tenderness  There is no rebound and no guarding  Musculoskeletal: Normal range of motion  Neurological: He is alert  He displays normal reflexes  Coordination normal    Skin: Skin is warm and dry  No rash noted  He is not diaphoretic  No erythema  Multiple small wounds on rt hand  Sutured   Psychiatric: He has a normal mood and affect  His behavior is normal    Nursing note and vitals reviewed               Parvez Bond DO

## 2020-03-12 NOTE — TELEPHONE ENCOUNTER
Patient scheduled for physical on March 26 at 11:30 am  Please order BW if needed  Send back to clerical  to inform patient    Emi Perez MA

## 2020-03-12 NOTE — LETTER
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

## 2020-03-13 ENCOUNTER — OFFICE VISIT (OUTPATIENT)
Dept: FAMILY MEDICINE CLINIC | Facility: CLINIC | Age: 25
End: 2020-03-13
Payer: COMMERCIAL

## 2020-03-13 VITALS
BODY MASS INDEX: 25.13 KG/M2 | RESPIRATION RATE: 16 BRPM | TEMPERATURE: 98.7 F | SYSTOLIC BLOOD PRESSURE: 120 MMHG | HEART RATE: 74 BPM | OXYGEN SATURATION: 99 % | WEIGHT: 165.8 LBS | HEIGHT: 68 IN | DIASTOLIC BLOOD PRESSURE: 84 MMHG

## 2020-03-13 DIAGNOSIS — S61.409D OPEN WOUND OF HAND WITHOUT FOREIGN BODY, UNSPECIFIED LATERALITY, UNSPECIFIED WOUND TYPE, SUBSEQUENT ENCOUNTER: Primary | ICD-10-CM

## 2020-03-13 PROCEDURE — 1036F TOBACCO NON-USER: CPT | Performed by: FAMILY MEDICINE

## 2020-03-13 PROCEDURE — 3008F BODY MASS INDEX DOCD: CPT | Performed by: FAMILY MEDICINE

## 2020-03-13 PROCEDURE — 99213 OFFICE O/P EST LOW 20 MIN: CPT | Performed by: FAMILY MEDICINE

## 2020-03-13 RX ORDER — DICYCLOMINE HYDROCHLORIDE 10 MG/1
10 CAPSULE ORAL
COMMUNITY
Start: 2020-02-25 | End: 2022-04-23

## 2020-03-13 NOTE — PROGRESS NOTES
Assessment/Plan:    1  Open wound of hand without foreign body, unspecified laterality, unspecified wound type, subsequent encounter        Gave pt a little more specific note  Advised if this is not enough for his employer he should have a form for me to fill out or he needs to call here    There are no Patient Instructions on file for this visit  No follow-ups on file  Subjective:      Patient ID: Rayne Found is a 21 y o  male  Chief Complaint   Patient presents with    Other     pt needs to be seen for hand suture  vfrma       Pt is here sched to recheck sutures in his rt hand  Not actually here for that - pt states his boss did not accept the not given yesterday that 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  The following portions of the patient's history were reviewed and updated as appropriate: allergies, current medications, past family history, past medical history, past social history, past surgical history and problem list     Review of Systems   Constitutional: Negative for activity change, appetite change, chills, diaphoresis, fatigue, fever and unexpected weight change  HENT: Negative for congestion, dental problem, ear pain, mouth sores, sinus pressure, sinus pain, sore throat and trouble swallowing  Eyes: Negative for photophobia, discharge and itching  Respiratory: Negative for apnea, chest tightness and shortness of breath  Cardiovascular: Negative for chest pain, palpitations and leg swelling  Gastrointestinal: Negative for abdominal distention, abdominal pain, blood in stool, nausea and vomiting  Endocrine: Negative for cold intolerance, heat intolerance, polydipsia, polyphagia and polyuria  Genitourinary: Negative for difficulty urinating  Musculoskeletal: Negative for arthralgias  Skin: Positive for wound  Negative for color change  Neurological: Negative for dizziness, syncope, speech difficulty and headaches     Hematological: Negative for adenopathy  Psychiatric/Behavioral: Negative for agitation and behavioral problems  Current Outpatient Medications   Medication Sig Dispense Refill    albuterol (2 5 mg/3 mL) 0 083 % nebulizer solution Take 1 vial (2 5 mg total) by nebulization every 6 (six) hours as needed for wheezing or shortness of breath 120 vial 6    albuterol (VENTOLIN HFA) 90 mcg/act inhaler Inhale 2 puffs every 4 (four) hours as needed for wheezing 1 Inhaler 5    dicyclomine (BENTYL) 10 mg capsule Take 10 mg by mouth      fluticasone-vilanterol (BREO ELLIPTA) 200-25 MCG/INH inhaler Inhale 1 puff daily Rinse mouth after use  60 each 2    omeprazole (PriLOSEC) 40 MG capsule Take 1 capsule (40 mg total) by mouth daily 30 capsule 0    ondansetron (ZOFRAN) 4 mg tablet Take 1 tablet (4 mg total) by mouth every 8 (eight) hours as needed for nausea or vomiting 20 tablet 0     No current facility-administered medications for this visit  Objective:    /84   Pulse 74   Temp 98 7 °F (37 1 °C)   Resp 16   Ht 5' 8" (1 727 m)   Wt 75 2 kg (165 lb 12 8 oz)   SpO2 99%   BMI 25 21 kg/m²        Physical Exam   Constitutional: He appears well-developed and well-nourished  No distress  HENT:   Head: Normocephalic and atraumatic  Right Ear: External ear normal    Left Ear: External ear normal    Nose: Nose normal    Mouth/Throat: Oropharynx is clear and moist  No oropharyngeal exudate  Eyes: Pupils are equal, round, and reactive to light  EOM are normal  Right eye exhibits no discharge  Left eye exhibits no discharge  No scleral icterus  Neck: No thyromegaly present  Cardiovascular: Normal rate and normal heart sounds  No murmur heard  Pulmonary/Chest: Effort normal and breath sounds normal  No respiratory distress  He has no wheezes  Abdominal: Soft  Bowel sounds are normal  He exhibits no distension and no mass  There is no tenderness  There is no rebound and no guarding     Musculoskeletal: Normal range of motion  Neurological: He is alert  He displays normal reflexes  Coordination normal    Skin: Skin is warm and dry  No rash noted  He is not diaphoretic  No erythema  cleasn dry dressing on rt hand   Psychiatric: He has a normal mood and affect  His behavior is normal    Nursing note and vitals reviewed               Parvez Bond DO

## 2020-03-13 NOTE — LETTER
March 13, 2020     Patient: Christopher Ames   YOB: 1995   Date of Visit: 3/13/2020       To Whom it May Concern:    Christopher Ames is under my professional care  He was seen in my office on 3/12/2020 and 3/13/2020  He should not be using his rt hand for lifting due to fears of the integrity of the wound  Pt is ok to do any work related to paperwork if that is available  This will be till the sutures are removed in the next 7 to ten days  If you have any questions or concerns, please don't hesitate to call           Sincerely,          Adriana Grief, DO        CC: No Recipients

## 2020-03-15 DIAGNOSIS — Z13.89 SCREENING FOR CARDIOVASCULAR, RESPIRATORY, AND GENITOURINARY DISEASES: Primary | ICD-10-CM

## 2020-03-15 DIAGNOSIS — Z13.83 SCREENING FOR CARDIOVASCULAR, RESPIRATORY, AND GENITOURINARY DISEASES: Primary | ICD-10-CM

## 2020-03-15 DIAGNOSIS — Z13.1 SCREENING FOR DIABETES MELLITUS (DM): ICD-10-CM

## 2020-03-15 DIAGNOSIS — Z13.6 SCREENING FOR CARDIOVASCULAR, RESPIRATORY, AND GENITOURINARY DISEASES: Primary | ICD-10-CM

## 2020-04-09 ENCOUNTER — TELEMEDICINE (OUTPATIENT)
Dept: FAMILY MEDICINE CLINIC | Facility: CLINIC | Age: 25
End: 2020-04-09
Payer: COMMERCIAL

## 2020-04-09 DIAGNOSIS — Z20.828 EXPOSURE TO SARS-ASSOCIATED CORONAVIRUS: Primary | ICD-10-CM

## 2020-04-09 PROCEDURE — 99213 OFFICE O/P EST LOW 20 MIN: CPT | Performed by: FAMILY MEDICINE

## 2020-05-03 DIAGNOSIS — J45.41 MODERATE PERSISTENT ASTHMA WITH ACUTE EXACERBATION: ICD-10-CM

## 2020-05-03 DIAGNOSIS — A08.4 VIRAL GASTROENTERITIS: ICD-10-CM

## 2020-05-05 RX ORDER — ONDANSETRON 4 MG/1
TABLET, FILM COATED ORAL
Qty: 20 TABLET | Refills: 0 | Status: SHIPPED | OUTPATIENT
Start: 2020-05-05 | End: 2020-05-27

## 2020-05-27 ENCOUNTER — TELEMEDICINE (OUTPATIENT)
Dept: FAMILY MEDICINE CLINIC | Facility: CLINIC | Age: 25
End: 2020-05-27
Payer: COMMERCIAL

## 2020-05-27 ENCOUNTER — TELEPHONE (OUTPATIENT)
Dept: FAMILY MEDICINE CLINIC | Facility: CLINIC | Age: 25
End: 2020-05-27

## 2020-05-27 DIAGNOSIS — Z20.828 EXPOSURE TO SARS-ASSOCIATED CORONAVIRUS: ICD-10-CM

## 2020-05-27 DIAGNOSIS — J30.1 SEASONAL ALLERGIC RHINITIS DUE TO POLLEN: ICD-10-CM

## 2020-05-27 DIAGNOSIS — R50.9 FEVER, UNSPECIFIED FEVER CAUSE: Primary | ICD-10-CM

## 2020-05-27 DIAGNOSIS — J45.21 MILD INTERMITTENT ASTHMA WITH ACUTE EXACERBATION: ICD-10-CM

## 2020-05-27 PROBLEM — R19.4 CHANGE IN BOWEL HABITS: Status: RESOLVED | Noted: 2020-02-25 | Resolved: 2020-05-27

## 2020-05-27 PROBLEM — R10.84 GENERALIZED ABDOMINAL PAIN: Status: RESOLVED | Noted: 2020-02-25 | Resolved: 2020-05-27

## 2020-05-27 PROCEDURE — 99214 OFFICE O/P EST MOD 30 MIN: CPT | Performed by: FAMILY MEDICINE

## 2020-05-27 PROCEDURE — U0003 INFECTIOUS AGENT DETECTION BY NUCLEIC ACID (DNA OR RNA); SEVERE ACUTE RESPIRATORY SYNDROME CORONAVIRUS 2 (SARS-COV-2) (CORONAVIRUS DISEASE [COVID-19]), AMPLIFIED PROBE TECHNIQUE, MAKING USE OF HIGH THROUGHPUT TECHNOLOGIES AS DESCRIBED BY CMS-2020-01-R: HCPCS

## 2020-05-28 LAB — SARS-COV-2 RNA SPEC QL NAA+PROBE: NOT DETECTED

## 2020-06-01 ENCOUNTER — OFFICE VISIT (OUTPATIENT)
Dept: FAMILY MEDICINE CLINIC | Facility: CLINIC | Age: 25
End: 2020-06-01
Payer: COMMERCIAL

## 2020-06-01 ENCOUNTER — TELEPHONE (OUTPATIENT)
Dept: FAMILY MEDICINE CLINIC | Facility: CLINIC | Age: 25
End: 2020-06-01

## 2020-06-01 VITALS
DIASTOLIC BLOOD PRESSURE: 70 MMHG | RESPIRATION RATE: 18 BRPM | SYSTOLIC BLOOD PRESSURE: 116 MMHG | TEMPERATURE: 97.2 F | OXYGEN SATURATION: 97 % | WEIGHT: 180 LBS | HEART RATE: 76 BPM | HEIGHT: 67 IN | BODY MASS INDEX: 28.25 KG/M2

## 2020-06-01 DIAGNOSIS — Z00.00 HEALTHCARE MAINTENANCE: Primary | ICD-10-CM

## 2020-06-01 DIAGNOSIS — J30.1 SEASONAL ALLERGIC RHINITIS DUE TO POLLEN: ICD-10-CM

## 2020-06-01 DIAGNOSIS — Z23 IMMUNIZATION DUE: ICD-10-CM

## 2020-06-01 DIAGNOSIS — J45.21 MILD INTERMITTENT ASTHMA WITH ACUTE EXACERBATION: ICD-10-CM

## 2020-06-01 PROBLEM — Z20.828 EXPOSURE TO SARS-ASSOCIATED CORONAVIRUS: Status: RESOLVED | Noted: 2020-05-27 | Resolved: 2020-06-01

## 2020-06-01 PROBLEM — R50.9 FEVER: Status: RESOLVED | Noted: 2020-05-27 | Resolved: 2020-06-01

## 2020-06-01 PROCEDURE — 99395 PREV VISIT EST AGE 18-39: CPT | Performed by: FAMILY MEDICINE

## 2020-06-01 PROCEDURE — 90471 IMMUNIZATION ADMIN: CPT

## 2020-06-01 PROCEDURE — 90715 TDAP VACCINE 7 YRS/> IM: CPT

## 2020-06-01 PROCEDURE — 3008F BODY MASS INDEX DOCD: CPT | Performed by: FAMILY MEDICINE

## 2020-06-03 ENCOUNTER — TELEPHONE (OUTPATIENT)
Dept: FAMILY MEDICINE CLINIC | Facility: CLINIC | Age: 25
End: 2020-06-03

## 2020-06-04 ENCOUNTER — TELEPHONE (OUTPATIENT)
Dept: FAMILY MEDICINE CLINIC | Facility: CLINIC | Age: 25
End: 2020-06-04

## 2020-06-28 DIAGNOSIS — J45.41 MODERATE PERSISTENT ASTHMA WITH ACUTE EXACERBATION: ICD-10-CM

## 2021-02-09 DIAGNOSIS — J45.41 MODERATE PERSISTENT ASTHMA WITH ACUTE EXACERBATION: ICD-10-CM

## 2021-02-09 RX ORDER — ALBUTEROL SULFATE 2.5 MG/3ML
2.5 SOLUTION RESPIRATORY (INHALATION) EVERY 6 HOURS PRN
Qty: 120 VIAL | Refills: 1 | Status: SHIPPED | OUTPATIENT
Start: 2021-02-09 | End: 2022-04-27 | Stop reason: SDUPTHER

## 2021-02-09 RX ORDER — ALBUTEROL SULFATE 90 UG/1
2 AEROSOL, METERED RESPIRATORY (INHALATION) EVERY 4 HOURS PRN
Qty: 1 INHALER | Refills: 5 | Status: SHIPPED | OUTPATIENT
Start: 2021-02-09 | End: 2022-04-27 | Stop reason: SDUPTHER

## 2021-03-09 ENCOUNTER — OCCMED (OUTPATIENT)
Dept: URGENT CARE | Facility: CLINIC | Age: 26
End: 2021-03-09
Payer: OTHER MISCELLANEOUS
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
## 2021-03-09 PROCEDURE — G0382 LEV 3 HOSP TYPE B ED VISIT: HCPCS | Performed by: PHYSICIAN ASSISTANT

## 2021-03-09 PROCEDURE — 99283 EMERGENCY DEPT VISIT LOW MDM: CPT | Performed by: PHYSICIAN ASSISTANT

## 2021-03-10 DIAGNOSIS — Z23 ENCOUNTER FOR IMMUNIZATION: ICD-10-CM

## 2021-03-11 ENCOUNTER — APPOINTMENT (OUTPATIENT)
Dept: RADIOLOGY | Facility: CLINIC | Age: 26
End: 2021-03-11
Payer: OTHER MISCELLANEOUS

## 2021-03-11 ENCOUNTER — OCCMED (OUTPATIENT)
Dept: URGENT CARE | Facility: CLINIC | Age: 26
End: 2021-03-11
Payer: OTHER MISCELLANEOUS

## 2021-03-11 DIAGNOSIS — M54.41 ACUTE RIGHT-SIDED LOW BACK PAIN WITH RIGHT-SIDED SCIATICA: Primary | ICD-10-CM

## 2021-03-11 DIAGNOSIS — M54.41 ACUTE RIGHT-SIDED LOW BACK PAIN WITH RIGHT-SIDED SCIATICA: ICD-10-CM

## 2021-03-11 DIAGNOSIS — M54.42 ACUTE LEFT-SIDED LOW BACK PAIN WITH LEFT-SIDED SCIATICA: ICD-10-CM

## 2021-03-11 PROCEDURE — 72100 X-RAY EXAM L-S SPINE 2/3 VWS: CPT

## 2021-03-11 PROCEDURE — 99213 OFFICE O/P EST LOW 20 MIN: CPT | Performed by: PHYSICIAN ASSISTANT

## 2021-03-12 ENCOUNTER — IMMUNIZATIONS (OUTPATIENT)
Dept: FAMILY MEDICINE CLINIC | Facility: HOSPITAL | Age: 26
End: 2021-03-12

## 2021-03-12 DIAGNOSIS — Z23 ENCOUNTER FOR IMMUNIZATION: Primary | ICD-10-CM

## 2021-03-12 PROCEDURE — 91300 SARS-COV-2 / COVID-19 MRNA VACCINE (PFIZER-BIONTECH) 30 MCG: CPT

## 2021-03-12 PROCEDURE — 0001A SARS-COV-2 / COVID-19 MRNA VACCINE (PFIZER-BIONTECH) 30 MCG: CPT

## 2021-03-17 ENCOUNTER — EVALUATION (OUTPATIENT)
Dept: PHYSICAL THERAPY | Facility: CLINIC | Age: 26
End: 2021-03-17
Payer: OTHER MISCELLANEOUS

## 2021-03-17 ENCOUNTER — TELEPHONE (OUTPATIENT)
Dept: HEMATOLOGY ONCOLOGY | Facility: CLINIC | Age: 26
End: 2021-03-17

## 2021-03-17 DIAGNOSIS — M54.16 LUMBAR RADICULOPATHY: Primary | ICD-10-CM

## 2021-03-17 PROCEDURE — 97110 THERAPEUTIC EXERCISES: CPT | Performed by: PHYSICAL THERAPIST

## 2021-03-17 PROCEDURE — 97161 PT EVAL LOW COMPLEX 20 MIN: CPT | Performed by: PHYSICAL THERAPIST

## 2021-03-17 PROCEDURE — 97112 NEUROMUSCULAR REEDUCATION: CPT | Performed by: PHYSICAL THERAPIST

## 2021-03-17 RX ORDER — PREDNISONE 20 MG/1
10 TABLET ORAL DAILY
COMMUNITY
End: 2022-04-23

## 2021-03-17 RX ORDER — METHOCARBAMOL 500 MG/1
500 TABLET, FILM COATED ORAL 4 TIMES DAILY
COMMUNITY
End: 2022-04-23

## 2021-03-17 NOTE — TELEPHONE ENCOUNTER
[facility] case management calling to see when patient was last seen in the office  I confirmed with her that I can not tell this based on the records in the chart  She will be reaching out to the PCP

## 2021-03-17 NOTE — PROGRESS NOTES
PT Evaluation     Today's date: 3/17/2021  Patient name: Deanne Torres  : 1995  MRN: [id number]  Referring provider: KIRA Romano*  Dx:   Encounter Diagnosis     ICD-10-CM    1  Lumbar radiculopathy  M54 16                   Assessment  Assessment details: Pt is a 31y o  year old male presenting to physical therapy for Lumbar radiculopathy  (primary encounter diagnosis) after work injury on 3/8/21 when lifting boxes  He presents with the following impairments: limited lumbar ROM, LE weakness, lumbar hypomobility, and + slump and SLR on L LE affecting their function with walking, standing, working, lifting, carrying, and bending  Pt will benefit from skilled physical therapy to address functional limitations noted in evaluation and meet patient goals  Impairments: abnormal or restricted ROM, abnormal movement, activity intolerance, impaired physical strength, lacks appropriate home exercise program, pain with function and poor body mechanics    Symptom irritability: moderateUnderstanding of Dx/Px/POC: good   Prognosis: good    Goals  ST  Pt will have 2/10 pain or less at rest   2  Pt will demonstrate good squat form to allow for safe lifting at work  LT  Pt will improve L LE strength to 4+/5 grossly to allow for squatting and lifting  2  Pt will improve lumbar flexion to nil deficits to allow pain-free lifting off floor  3   Pt will be I w HEP    Plan  Patient would benefit from: PT eval and skilled physical therapy  Planned modality interventions: biofeedback, TENS, thermotherapy: hydrocollator packs and electrical stimulation/Russian stimulation  Planned therapy interventions: abdominal trunk stabilization, joint mobilization, manual therapy, neuromuscular re-education, patient education, strengthening, stretching, therapeutic activities, therapeutic exercise, flexibility, functional ROM exercises and home exercise program  Frequency: 2x week  Treatment plan discussed with: patient        Subjective Evaluation    History of Present Illness  Mechanism of injury: Pt reports LBP after work injury on 3/8/21  Pt reports numbness and tingling sandy his left leg into his l ankle  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  Pt is taking prednisone and methocarbamol which is helping a little  He is currently not working  Not a recurrent problem   Pain  Current pain ratin  At worst pain rating: 10  Quality: radiating  Relieving factors: rest and relaxation  Aggravating factors: walking and standing      Diagnostic Tests  X-ray: normal  Treatments  Previous treatment: medication  Current treatment: medication and physical therapy  Patient Goals  Patient goals for therapy: increased strength, return to work, independence with ADLs/IADLs, return to sport/leisure activities, increased motion and decreased pain          Objective     Concurrent Complaints  Positive for history of trauma  Negative for night pain, disturbed sleep, bladder dysfunction and bowel dysfunction    Palpation   Left   Hypertonic in the lumbar paraspinals and quadratus lumborum  Tenderness of the lumbar paraspinals and quadratus lumborum  Right   No palpable tenderness to the lumbar paraspinals and quadratus lumborum       Active Range of Motion     Lumbar   Flexion:  with pain Restriction level: moderate  Extension:  Restriction level: minimal  Left lateral flexion:  with pain Restriction level: moderate  Right lateral flexion:  WFL  Left rotation:  with pain Restriction level: minimal  Right rotation:  [facility]    Joint Play   Joints within functional limits: T8, T9, T10, T11 and T12     Hypomobile: L1, L2, L3, L4, L5 and S1     Pain: L2, L3, L4, L5 and S1     Strength/Myotome Testing     Lumbar   Left   Heel walk: normal  Toe walk: normal    Right   Heel walk: normal  Toe walk: normal    Left Hip   Planes of Motion   Flexion: 3+    Right Hip   Planes of Motion   Flexion: 4+    Left Knee   Flexion: 4-  Extension: 4    Right Knee   Flexion: 4+  Extension: 5    Muscle Activation   Patient unable to activate left transverse abdominals and right transverse abdominals  Tests     Lumbar   Negative prone instability   Left   Positive passive SLR and slump test    Negative crossed SLR  Right   Negative crossed SLR, passive SLR and slump test      Functional Assessment      Squat    Unable to perform , pain, trunk lean right and right tibial anterior translation beyond toes  Precautions:  Worker's Comp    Date 3/17            Visit # 1            FOTO 33/69             Re-eval IE              Manuals 3/17            Lumbar Gapping Mob SF L side                                                   Neuro Re-Ed             Bridge 10x ea w SLR            Bird Dog 10x ea            Curl Up             Plank             Squat                          Pt Edu SF 5'            Ther Ex             Bike             SELAM/REIL 10x ea            Plank             Lateral Shift 10x L                                                                Ther Activity                                       Gait Training                                       Modalities

## 2021-03-22 ENCOUNTER — APPOINTMENT (OUTPATIENT)
Dept: URGENT CARE | Facility: CLINIC | Age: 26
End: 2021-03-22
Payer: OTHER MISCELLANEOUS

## 2021-03-22 PROCEDURE — 99213 OFFICE O/P EST LOW 20 MIN: CPT

## 2021-03-23 ENCOUNTER — OFFICE VISIT (OUTPATIENT)
Dept: PHYSICAL THERAPY | Facility: CLINIC | Age: 26
End: 2021-03-23
Payer: OTHER MISCELLANEOUS

## 2021-03-23 DIAGNOSIS — M54.16 LUMBAR RADICULOPATHY: Primary | ICD-10-CM

## 2021-03-23 PROCEDURE — 97110 THERAPEUTIC EXERCISES: CPT

## 2021-03-23 PROCEDURE — 97112 NEUROMUSCULAR REEDUCATION: CPT

## 2021-03-23 NOTE — PROGRESS NOTES
Daily Note     Today's date: 3/23/2021  Patient name: Cheng Loja  : 1995  MRN: [id number]  Referring provider: KIRA Benson*  Dx:   Encounter Diagnosis     ICD-10-CM    1  Lumbar radiculopathy  M54 16        Start Time:   Stop Time:   Total time in clinic (min): 50 minutes    Subjective: Pt presents to PT with reports of increased pain and symptoms over the weekend, states he saw an MD as was prescribed increased medication  Pt states he feels better from the medication  Objective: See treatment diary below      Assessment: Tolerated treatment well  Patient demonstrated fatigue post treatment and would benefit from continued PT  Pt performed entire exercise program with no signs of increased pain or adverse symptoms  Pt shows reduction of symptoms post Tx  Pt will benefit from further skilled PT to increase strength, flexibility and function  Continue to progress as able  Plan: Continue per plan of care  Precautions:  Worker's Comp    Date 3/17 3/23           Visit # 1 2           FOTO              Re-eval IE              Manuals 3/17 3/23           Lumbar Gapping Mob SF L side SF L side                                                  Neuro Re-Ed             Bridge 10x ea w SLR 10x ea w SLR           Bird Dog 10x ea 10x ea           Curl Up  RMB 15x           Plank  2x20"           Squat  2x10                        Pt Edu SF 5'            Ther Ex  3/23           Bike  8'           SELAM/REIL 10x ea 10x10"           Lateral Shift 10x L 10x L                                                                Ther Activity                                       Gait Training                                       Modalities

## 2021-03-25 ENCOUNTER — OFFICE VISIT (OUTPATIENT)
Dept: PHYSICAL THERAPY | Facility: CLINIC | Age: 26
End: 2021-03-25
Payer: OTHER MISCELLANEOUS

## 2021-03-25 DIAGNOSIS — M54.16 LUMBAR RADICULOPATHY: Primary | ICD-10-CM

## 2021-03-25 PROCEDURE — 97112 NEUROMUSCULAR REEDUCATION: CPT

## 2021-03-25 PROCEDURE — 97110 THERAPEUTIC EXERCISES: CPT

## 2021-03-25 NOTE — PROGRESS NOTES
Daily Note     Today's date: 3/25/2021  Patient name: Cheng Loja  : 1995  MRN: [id number]  Referring provider: KIRA Benson*  Dx:   Encounter Diagnosis     ICD-10-CM    1  Lumbar radiculopathy  M54 16        Start Time: 4340  Stop Time: 1210  Total time in clinic (min): 40 minutes    Subjective: Pt presents to PT with reports of minimal symptoms after last session, state the next day he felt good  Objective: See treatment diary below      Assessment: Tolerated treatment well  Patient demonstrated fatigue post treatment and would benefit from continued PT  Pt performed new exercise as noted with no signs of increased pain or adverse symptoms  Pt shows reduction of symptoms post Tx  Pt will benefit from further skilled PT to increase strength, flexibility and function  Continue to progress as able  Plan: Continue per plan of care  Precautions:  Worker's Comp    Date 3/17 3/23 3/25          Visit # 1 2 3          FOTO              Re-eval IE              Manuals 3/17 3/23 3/25          Lumbar Gapping Mob SF L side SF L side                                                  Neuro Re-Ed             Bridge 10x ea w SLR 10x ea w SLR 10x ea w SLR          Bird Dog 10x ea 10x ea 10x          Curl Up  RMB 15x RMB 15x          Plank  2x20" 2x30"          Squat  2x10 2x10                       Pt Edu SF 5'            Ther Ex  3/23 3/25          Bike  8' 8'          SELAM/REIL 10x ea 10x10" 10x10"          Lateral Shift 10x L 10x L  10x L                                                              Ther Activity             [place] Carry   26# 2 laps ea                       Gait Training                                       Modalities

## 2021-03-30 ENCOUNTER — APPOINTMENT (OUTPATIENT)
Dept: PHYSICAL THERAPY | Facility: CLINIC | Age: 26
End: 2021-03-30
Payer: OTHER MISCELLANEOUS

## 2021-04-01 ENCOUNTER — OFFICE VISIT (OUTPATIENT)
Dept: PHYSICAL THERAPY | Facility: CLINIC | Age: 26
End: 2021-04-01
Payer: OTHER MISCELLANEOUS

## 2021-04-01 DIAGNOSIS — M54.16 LUMBAR RADICULOPATHY: Primary | ICD-10-CM

## 2021-04-01 PROCEDURE — 97110 THERAPEUTIC EXERCISES: CPT

## 2021-04-01 PROCEDURE — 97112 NEUROMUSCULAR REEDUCATION: CPT

## 2021-04-01 NOTE — PROGRESS NOTES
Daily Note     Today's date: 2021  Patient name: Rafael Hansen  : 1995  MRN: [id number]  Referring provider: KIRA Childers*  Dx:   Encounter Diagnosis     ICD-10-CM    1  Lumbar radiculopathy  M54 16        Start Time: 1030  Stop Time: 1110  Total time in clinic (min): 40 minutes    Subjective: Pt presents to PT with reports of minimal symptoms overall, states he has been noticing some improvements in pain and states relief last longer between sessions  Objective: See treatment diary below      Assessment: Tolerated treatment well  Patient demonstrated fatigue post treatment and would benefit from continued PT  Pt shows good challenge with current exercises, Pt needed minimal rest break today due to fatigue  Pt will benefit from further skilled PT to increase strength, flexibility and function  Continue to progress as able  Plan: Continue per plan of care  Precautions:  Worker's Comp    Date 3/17 3/23 3/25 4/1         Visit # 1 2 3 4         FOTO 33/69             Re-eval IE              Manuals 3/17 3/23 3/25 4/1         Lumbar Gapping Mob SF L side SF L side                                                  Neuro Re-Ed             Bridge 10x ea w SLR 10x ea w SLR 10x ea w SLR 10x ea w SLR         Bird Dog 10x ea 10x ea 10x 10x         Curl Up  RMB 15x RMB 15x RMB 2x10         Plank  2x20" 2x30" 2x30"         Squat  2x10 2x10 18# 2x10                      Pt Edu SF 5'            Ther Ex  3/23 3/25 4/1         Bike  8' 8' 10'         SELAM/REIL 10x ea 10x10" 10x10" 10x10"         Lateral Shift 10x L 10x L  10x L 10x L                                                             Ther Activity             [place] Carry   26# 2 laps ea 26# 2 laps ea                      Gait Training                                       Modalities

## 2021-04-02 ENCOUNTER — IMMUNIZATIONS (OUTPATIENT)
Dept: FAMILY MEDICINE CLINIC | Facility: HOSPITAL | Age: 26
End: 2021-04-02

## 2021-04-02 DIAGNOSIS — Z23 ENCOUNTER FOR IMMUNIZATION: Primary | ICD-10-CM

## 2021-04-02 PROCEDURE — 91300 SARS-COV-2 / COVID-19 MRNA VACCINE (PFIZER-BIONTECH) 30 MCG: CPT

## 2021-04-02 PROCEDURE — 0002A SARS-COV-2 / COVID-19 MRNA VACCINE (PFIZER-BIONTECH) 30 MCG: CPT

## 2021-04-05 ENCOUNTER — OCCMED (OUTPATIENT)
Dept: URGENT CARE | Facility: CLINIC | Age: 26
End: 2021-04-05
Payer: OTHER MISCELLANEOUS

## 2021-04-05 DIAGNOSIS — S39.012A STRAIN OF MUSCLE, FASCIA AND TENDON OF LOWER BACK, INITIAL ENCOUNTER: Primary | ICD-10-CM

## 2021-04-05 PROCEDURE — 99213 OFFICE O/P EST LOW 20 MIN: CPT | Performed by: PHYSICIAN ASSISTANT

## 2021-04-06 ENCOUNTER — OFFICE VISIT (OUTPATIENT)
Dept: PHYSICAL THERAPY | Facility: CLINIC | Age: 26
End: 2021-04-06
Payer: OTHER MISCELLANEOUS

## 2021-04-06 DIAGNOSIS — M54.16 LUMBAR RADICULOPATHY: Primary | ICD-10-CM

## 2021-04-06 PROCEDURE — 97112 NEUROMUSCULAR REEDUCATION: CPT

## 2021-04-06 PROCEDURE — 97110 THERAPEUTIC EXERCISES: CPT

## 2021-04-06 NOTE — PROGRESS NOTES
Daily Note     Today's date: 2021  Patient name: Juan Garvin  : 1995  MRN: [id number]  Referring provider: KIRA Garcia*  Dx:   Encounter Diagnosis     ICD-10-CM    1  Lumbar radiculopathy  M54 16        Start Time: 1030  Stop Time: 1115  Total time in clinic (min): 45 minutes    Subjective: Pt presents to PT with reports of minimal symptoms overall, states he woke up with some increased pain and soreness  Objective: See treatment diary below      Assessment: Tolerated treatment well  Patient demonstrated fatigue post treatment and would benefit from continued PT  Pt continues to work towards goals of increased core strength and stability  Pt needed minimal VCing for proper core activation with bridges w/ SLR and birddogs  Pt will benefit from further skilled PT to increase strength, flexibility and function  Continue to progress as able  Plan: Continue per plan of care  Precautions:  Worker's Comp    Date 3/17 3/23 3/25 4/1 4/        Visit # 1 2 3 4 5        FOTO /             Re-eval IE              Manuals 3/17 3/23 3/25 4/1 4/6        Lumbar Gapping Mob SF L side SF L side                                                  Neuro Re-Ed             Bridge 10x ea w SLR 10x ea w SLR 10x ea w SLR 10x ea w SLR 2x10 ea w SLR        Bird Dog 10x ea 10x ea 10x 10x 2x10        Curl Up  RMB 15x RMB 15x RMB 2x10 YMB 15x        Plank  2x20" 2x30" 2x30" 2x30"        Squat  2x10 2x10 18# 2x10 18# 2x10                     Pt Edu SF 5'            Ther Ex  3/23 3/25 4/1 4/6        Bike  8' 8' 10' 10'        SELAM/REIL 10x ea 10x10" 10x10" 10x10" 10x10"        Lateral Shift 10x L 10x L  10x L 10x L 10x L                                                            Ther Activity             [place] Carry   26# 2 laps ea 26# 2 laps ea 26# 2 laps ea                     Gait Training                                       Modalities

## 2021-04-08 ENCOUNTER — OFFICE VISIT (OUTPATIENT)
Dept: PHYSICAL THERAPY | Facility: CLINIC | Age: 26
End: 2021-04-08
Payer: OTHER MISCELLANEOUS

## 2021-04-08 DIAGNOSIS — M54.16 LUMBAR RADICULOPATHY: Primary | ICD-10-CM

## 2021-04-08 PROCEDURE — 97112 NEUROMUSCULAR REEDUCATION: CPT

## 2021-04-08 PROCEDURE — 97110 THERAPEUTIC EXERCISES: CPT

## 2021-04-08 NOTE — PROGRESS NOTES
Daily Note     Today's date: 2021  Patient name: Kenny James  : 1995  MRN: [id number]  Referring provider: KIRA Odell*  Dx:   Encounter Diagnosis     ICD-10-CM    1  Lumbar radiculopathy  M54 16        Start Time:   Stop Time: 113  Total time in clinic (min): 55 minutes    Subjective: Pt presents to PT with reports of minimal symptoms overall, Pt states he is to go back to work today  Objective: See treatment diary below      Assessment: Tolerated treatment well  Patient demonstrated fatigue post treatment and would benefit from continued PT  Pt continues to work towards goals of increased core strength and stability  Pt performed new exercises as noted with no signs of increased pain or adverse symptoms  Pt will benefit from further skilled PT to increase strength, flexibility and function  Continue to progress as able  Plan: Continue per plan of care  Precautions:  Worker's Comp    Date 3/17 3/23 3/25 4/1 4/6 4/8       Visit # 1 2 3 4 5 6       FOTO              Re-eval IE              Manuals 3/17 3/23 3/25 4/1 4/6 4/8       Lumbar Gapping Mob SF L side SF L side                                                  Neuro Re-Ed             Bridge 10x ea w SLR 10x ea w SLR 10x ea w SLR 10x ea w SLR 2x10 ea w SLR        Bird Dog 10x ea 10x ea 10x 10x 2x10 2x10       Curl Up  RMB 15x RMB 15x RMB 2x10 YMB 15x        Plank  2x20" 2x30" 2x30" 2x30"        Squat  2x10 2x10 18# 2x10 18# 2x10 18# 2x10       Palloff press      10# 10x ea       UTR      6# 10x ea       Tall plank TB hoz abd & shoulder ext      GTB 10x ea                                 Pt Edu SF 5'            Ther Ex  3/23 3/25 4/1 4/6 4/8       Bike  8' 8' 10' 10' 10'       SELAM/REIL 10x ea 10x10" 10x10" 10x10" 10x10" 10x10"       Lateral Shift 10x L 10x L  10x L 10x L 10x L 10x L                                                           Ther Activity             [place] Carry   26# 2 laps ea 26# 2 laps ea 26# 2 laps ea 26# 2 laps ea                    Gait Training                                       Modalities

## 2021-04-12 ENCOUNTER — APPOINTMENT (OUTPATIENT)
Dept: URGENT CARE | Facility: CLINIC | Age: 26
End: 2021-04-12
Payer: OTHER MISCELLANEOUS

## 2021-04-12 PROCEDURE — 99213 OFFICE O/P EST LOW 20 MIN: CPT | Performed by: PREVENTIVE MEDICINE

## 2021-04-15 ENCOUNTER — APPOINTMENT (OUTPATIENT)
Dept: PHYSICAL THERAPY | Facility: CLINIC | Age: 26
End: 2021-04-15
Payer: OTHER MISCELLANEOUS

## 2021-11-27 ENCOUNTER — OFFICE VISIT (OUTPATIENT)
Dept: URGENT CARE | Facility: MEDICAL CENTER | Age: 26
End: 2021-11-27

## 2021-11-27 VITALS
HEART RATE: 92 BPM | TEMPERATURE: 98.7 F | WEIGHT: 203 LBS | OXYGEN SATURATION: 99 % | BODY MASS INDEX: 30.77 KG/M2 | HEIGHT: 68 IN

## 2021-11-27 DIAGNOSIS — B34.9 VIRAL INFECTION: Primary | ICD-10-CM

## 2021-11-27 PROCEDURE — U0003 INFECTIOUS AGENT DETECTION BY NUCLEIC ACID (DNA OR RNA); SEVERE ACUTE RESPIRATORY SYNDROME CORONAVIRUS 2 (SARS-COV-2) (CORONAVIRUS DISEASE [COVID-19]), AMPLIFIED PROBE TECHNIQUE, MAKING USE OF HIGH THROUGHPUT TECHNOLOGIES AS DESCRIBED BY CMS-2020-01-R: HCPCS | Performed by: PHYSICIAN ASSISTANT

## 2021-11-27 PROCEDURE — U0005 INFEC AGEN DETEC AMPLI PROBE: HCPCS | Performed by: PHYSICIAN ASSISTANT

## 2021-11-27 PROCEDURE — G0383 LEV 4 HOSP TYPE B ED VISIT: HCPCS | Performed by: PHYSICIAN ASSISTANT

## 2021-11-27 RX ORDER — ONDANSETRON 4 MG/1
4 TABLET, ORALLY DISINTEGRATING ORAL EVERY 6 HOURS PRN
Qty: 20 TABLET | Refills: 0 | Status: SHIPPED | OUTPATIENT
Start: 2021-11-27 | End: 2021-11-27

## 2021-11-27 RX ORDER — ONDANSETRON 4 MG/1
4 TABLET, ORALLY DISINTEGRATING ORAL EVERY 6 HOURS PRN
Qty: 20 TABLET | Refills: 0 | Status: SHIPPED | OUTPATIENT
Start: 2021-11-27 | End: 2022-04-27

## 2021-11-27 RX ORDER — LOPERAMIDE HYDROCHLORIDE 2 MG/1
2 TABLET ORAL 4 TIMES DAILY PRN
Qty: 30 TABLET | Refills: 0 | Status: SHIPPED | OUTPATIENT
Start: 2021-11-27 | End: 2021-11-27

## 2021-11-27 RX ORDER — LOPERAMIDE HYDROCHLORIDE 2 MG/1
2 TABLET ORAL 4 TIMES DAILY PRN
Qty: 30 TABLET | Refills: 0 | Status: SHIPPED | OUTPATIENT
Start: 2021-11-27 | End: 2022-04-23

## 2021-11-28 LAB — SARS-COV-2 RNA RESP QL NAA+PROBE: NEGATIVE

## 2021-12-02 ENCOUNTER — TELEPHONE (OUTPATIENT)
Dept: FAMILY MEDICINE CLINIC | Facility: CLINIC | Age: 26
End: 2021-12-02

## 2022-04-27 ENCOUNTER — OFFICE VISIT (OUTPATIENT)
Dept: FAMILY MEDICINE CLINIC | Facility: CLINIC | Age: 27
End: 2022-04-27
Payer: COMMERCIAL

## 2022-04-27 VITALS
BODY MASS INDEX: 30.01 KG/M2 | HEIGHT: 68 IN | HEART RATE: 101 BPM | WEIGHT: 198 LBS | TEMPERATURE: 97.5 F | DIASTOLIC BLOOD PRESSURE: 90 MMHG | SYSTOLIC BLOOD PRESSURE: 140 MMHG | RESPIRATION RATE: 16 BRPM | OXYGEN SATURATION: 94 %

## 2022-04-27 DIAGNOSIS — H10.13 ALLERGIC CONJUNCTIVITIS OF BOTH EYES: ICD-10-CM

## 2022-04-27 DIAGNOSIS — E66.9 OBESITY (BMI 30-39.9): ICD-10-CM

## 2022-04-27 DIAGNOSIS — J45.21 MILD INTERMITTENT ASTHMA WITH ACUTE EXACERBATION: ICD-10-CM

## 2022-04-27 DIAGNOSIS — J30.89 SEASONAL ALLERGIC RHINITIS DUE TO OTHER ALLERGIC TRIGGER: Primary | ICD-10-CM

## 2022-04-27 PROCEDURE — 99214 OFFICE O/P EST MOD 30 MIN: CPT | Performed by: FAMILY MEDICINE

## 2022-04-27 PROCEDURE — 3725F SCREEN DEPRESSION PERFORMED: CPT | Performed by: FAMILY MEDICINE

## 2022-04-27 PROCEDURE — 1036F TOBACCO NON-USER: CPT | Performed by: FAMILY MEDICINE

## 2022-04-27 RX ORDER — FLUTICASONE PROPIONATE 50 MCG
2 SPRAY, SUSPENSION (ML) NASAL DAILY
Qty: 48 G | Refills: 1 | Status: SHIPPED | OUTPATIENT
Start: 2022-04-27

## 2022-04-27 RX ORDER — MONTELUKAST SODIUM 10 MG/1
10 TABLET ORAL
Qty: 90 TABLET | Refills: 1 | Status: SHIPPED | OUTPATIENT
Start: 2022-04-27 | End: 2022-07-11 | Stop reason: SDUPTHER

## 2022-04-27 RX ORDER — LEVOCETIRIZINE DIHYDROCHLORIDE 5 MG/1
5 TABLET, FILM COATED ORAL DAILY
Qty: 90 TABLET | Refills: 1 | Status: SHIPPED | OUTPATIENT
Start: 2022-04-27 | End: 2022-06-28

## 2022-04-27 RX ORDER — OLOPATADINE HYDROCHLORIDE 1 MG/ML
1 SOLUTION/ DROPS OPHTHALMIC 2 TIMES DAILY
Qty: 15 ML | Refills: 1 | Status: SHIPPED | OUTPATIENT
Start: 2022-04-27

## 2022-04-27 RX ORDER — ALBUTEROL SULFATE 90 UG/1
2 AEROSOL, METERED RESPIRATORY (INHALATION) EVERY 6 HOURS PRN
Qty: 18 G | Refills: 2 | Status: SHIPPED | OUTPATIENT
Start: 2022-04-27

## 2022-04-27 RX ORDER — ALBUTEROL SULFATE 2.5 MG/3ML
2.5 SOLUTION RESPIRATORY (INHALATION) EVERY 6 HOURS PRN
Qty: 120 ML | Refills: 1 | Status: SHIPPED | OUTPATIENT
Start: 2022-04-27 | End: 2022-06-28

## 2022-04-27 NOTE — PROGRESS NOTES
Assessment/Plan:    No problem-specific Assessment & Plan notes found for this encounter  AR/AC worse  Start quadruple therapy, INS, H1b po and eyes, singulair  Step down therapy if effective  Possible allergist referral if no better vs pulmonary referral    Asthma with allergy trigger  singulair  maxime prn  F/u for other options such as ics/laba if not controlled    bmi aware  Try to lose weight     Diagnoses and all orders for this visit:    Seasonal allergic rhinitis due to other allergic trigger  -     fluticasone (FLONASE) 50 mcg/act nasal spray; 2 sprays into each nostril daily  -     levocetirizine (XYZAL) 5 MG tablet; Take 1 tablet (5 mg total) by mouth daily  -     montelukast (SINGULAIR) 10 mg tablet; Take 1 tablet (10 mg total) by mouth daily at bedtime    Mild intermittent asthma with acute exacerbation  -     albuterol (Ventolin HFA) 90 mcg/act inhaler; Inhale 2 puffs every 6 (six) hours as needed for shortness of breath  -     albuterol (2 5 mg/3 mL) 0 083 % nebulizer solution; Take 3 mL (2 5 mg total) by nebulization every 6 (six) hours as needed for wheezing or shortness of breath    Allergic conjunctivitis of both eyes  -     olopatadine (PATANOL) 0 1 % ophthalmic solution; Administer 1 drop to both eyes 2 (two) times a day    Obesity (BMI 30-39  9)        Return in about 6 months (around 10/27/2022)  Subjective:      Patient ID: Neema Warren is a 31 y o  male      Chief Complaint   Patient presents with   David Durham       [facility]  Manager at [address] [place] [address] currently  [place] past month  Temp changes trigger  [place] in [place]  Sneeze  Watery eyes  Cough  Some wheezing  Using claritin daily  Benadryl sometimes  nasacort used, not daily though  No eye drops  Has a new dog    The following portions of the patient's history were reviewed and updated as appropriate: allergies, current medications, past family history, past medical history, past social history, past surgical history and problem list     Review of Systems   Constitutional: Negative for chills and fever  Respiratory: Positive for wheezing  Current Outpatient Medications   Medication Sig Dispense Refill    albuterol (2 5 mg/3 mL) 0 083 % nebulizer solution Take 3 mL (2 5 mg total) by nebulization every 6 (six) hours as needed for wheezing or shortness of breath 120 mL 1    albuterol (Ventolin HFA) 90 mcg/act inhaler Inhale 2 puffs every 6 (six) hours as needed for shortness of breath 18 g 2    fluticasone (FLONASE) 50 mcg/act nasal spray 2 sprays into each nostril daily 48 g 1    levocetirizine (XYZAL) 5 MG tablet Take 1 tablet (5 mg total) by mouth daily 90 tablet 1    montelukast (SINGULAIR) 10 mg tablet Take 1 tablet (10 mg total) by mouth daily at bedtime 90 tablet 1    olopatadine (PATANOL) 0 1 % ophthalmic solution Administer 1 drop to both eyes 2 (two) times a day 15 mL 1     No current facility-administered medications for this visit  Objective:    /90   Pulse 101   Temp 97 5 °F (36 4 °C)   Resp 16   Ht 5' 8" (1 727 m)   Wt 89 8 kg (198 lb)   SpO2 94%   BMI 30 11 kg/m²        Physical Exam  Vitals and nursing note reviewed  Constitutional:       General: He is not in acute distress  Appearance: He is well-developed  He is obese  He is not ill-appearing  HENT:      Head: Normocephalic  Right Ear: Tympanic membrane normal       Left Ear: Tympanic membrane normal       Nose: Congestion present  Mouth/Throat:      Pharynx: No oropharyngeal exudate  Eyes:      General:         Right eye: No discharge  Left eye: No discharge  Conjunctiva/sclera: Conjunctivae normal    Cardiovascular:      Rate and Rhythm: Normal rate and regular rhythm  Heart sounds: No murmur heard  Pulmonary:      Effort: Pulmonary effort is normal  No respiratory distress  Breath sounds: Wheezing present  No rhonchi or rales  Abdominal:      Palpations: Abdomen is soft  Musculoskeletal:         General: No deformity  Cervical back: Neck supple  Skin:     General: Skin is warm and dry  Coloration: Skin is not pale  Neurological:      Mental Status: He is alert  Motor: No weakness  Gait: Gait normal    Psychiatric:         Mood and Affect: Mood normal          Behavior: Behavior normal          Thought Content: Thought content normal        BMI Counseling: Body mass index is 30 11 kg/m²  The BMI is above normal  Nutrition recommendations include decreasing portion sizes and moderation in carbohydrate intake  Exercise recommendations include exercising 3-5 times per week  No pharmacotherapy was ordered  Rationale for BMI follow-up plan is due to patient being overweight or obese  Depression Screening and Follow-up Plan: Patient was screened for depression during today's encounter  They screened negative with a PHQ-2 score of 0               Tatiana Goodpasture,

## 2022-06-12 ENCOUNTER — OFFICE VISIT (OUTPATIENT)
Dept: URGENT CARE | Facility: CLINIC | Age: 27
End: 2022-06-12
Payer: COMMERCIAL

## 2022-06-12 VITALS
BODY MASS INDEX: 30.11 KG/M2 | OXYGEN SATURATION: 98 % | DIASTOLIC BLOOD PRESSURE: 62 MMHG | RESPIRATION RATE: 18 BRPM | TEMPERATURE: 97.7 F | SYSTOLIC BLOOD PRESSURE: 134 MMHG | HEART RATE: 87 BPM | WEIGHT: 198 LBS

## 2022-06-12 DIAGNOSIS — M54.42 LEFT-SIDED LOW BACK PAIN WITH LEFT-SIDED SCIATICA, UNSPECIFIED CHRONICITY: Primary | ICD-10-CM

## 2022-06-12 PROCEDURE — 99213 OFFICE O/P EST LOW 20 MIN: CPT | Performed by: PHYSICIAN ASSISTANT

## 2022-06-12 RX ORDER — NAPROXEN 500 MG/1
500 TABLET ORAL 2 TIMES DAILY WITH MEALS
Qty: 14 TABLET | Refills: 0 | Status: SHIPPED | OUTPATIENT
Start: 2022-06-12 | End: 2022-07-11

## 2022-06-12 RX ORDER — CYCLOBENZAPRINE HCL 10 MG
10 TABLET ORAL 3 TIMES DAILY PRN
Qty: 20 TABLET | Refills: 0 | Status: SHIPPED | OUTPATIENT
Start: 2022-06-12

## 2022-06-12 NOTE — PROGRESS NOTES
[facility] [facility]      NAME: Larisa Jones is a 31 y o  male  : 1995    MRN: [id number]  DATE: 2022  TIME: 2:08 PM    Assessment and Plan   Left-sided low back pain with left-sided sciatica, unspecified chronicity [M54 42]  1  Left-sided low back pain with left-sided sciatica, unspecified chronicity  cyclobenzaprine (FLEXERIL) 10 mg tablet    Ambulatory Referral to Physical Therapy    naproxen (Naprosyn) 500 mg tablet       Patient Instructions   Acute Low Back Pain   AMBULATORY CARE:   Acute low back pain  is sudden discomfort that lasts up to 6 weeks and makes activity difficult  Common symptoms include the following:   · Back stiffness or spasms     · Pain down the back or side of one leg     · Holding yourself in an unusual position or posture to decrease your back pain     · Not being able to find a sitting position that is comfortable     · Slow increase in your pain for 24 to 48 hours after you stress your back     · Tenderness on your lower back or severe pain when you move your back     Seek care immediately if:   · You have severe pain      · You have sudden stiffness and heaviness on both buttocks down to both legs      · You have numbness or weakness in one leg, or pain in both legs      · You have numbness in your genital area or across your lower back      · You cannot control your urine or bowel movements      Call your doctor if:   · You have a fever      · You have pain at night or when you rest      · Your pain does not get better with treatment      · You have pain that worsens when you cough or sneeze      · You suddenly feel something pop or snap in your back      · You have questions or concerns about your condition or care      The goal of treatment for acute low back pain  is to relieve your pain and help you be able to do your regular activities  Most people with acute lower back pain get better within 4 to 6 weeks   You may need any of the following:  · NSAIDs , such as ibuprofen, help decrease swelling, pain, and fever  This medicine is available with or without a doctor's order  NSAIDs can cause stomach bleeding or kidney problems in certain people  If you take blood thinner medicine, always ask your healthcare provider if NSAIDs are safe for you  Always read the medicine label and follow directions      · Acetaminophen  decreases pain and fever  It is available without a doctor's order  Ask how much to take and how often to take it  Follow directions  Read the labels of all other medicines you are using to see if they also contain acetaminophen, or ask your doctor or pharmacist  Acetaminophen can cause liver damage if not taken correctly  Do not use more than 4 grams (4,000 milligrams) total of acetaminophen in one day       · Muscle relaxers  decrease pain by relaxing the muscles in your lower spine      · Prescription pain medicine  may be given  Ask your healthcare provider how to take this medicine safely  Some prescription pain medicines contain acetaminophen  Do not take other medicines that contain acetaminophen without talking to your healthcare provider  Too much acetaminophen may cause liver damage  Prescription pain medicine may cause constipation  Ask your healthcare provider how to prevent or treat constipation      Manage your symptoms:   · Stay active  as much as you can without causing more pain  Bed rest could make your back pain worse  Start with some light exercises such as walking  Avoid heavy lifting until your pain is gone  Ask for more information about the activities or exercises that are right for you           · Apply heat  on your back for 20 to 30 minutes every 2 hours for as many days as directed  Heat helps decrease pain and muscle spasms  Alternate heat and ice      · Apply ice  on your back for 15 to 20 minutes every hour or as directed  Use an ice pack, or put crushed ice in a plastic bag  Cover it with a towel before you apply it to your skin  Ice helps prevent tissue damage and decreases swelling and pain      Prevent acute low back pain:   · Use proper body mechanics        ? Bend at the hips and knees when you  objects  Do not bend from the waist  Use your leg muscles as you lift the load  Do not use your back  Keep the object close to your chest as you lift it  Try not to twist or lift anything above your waist           ? Change your position often when you stand for long periods of time  Rest one foot on a small box or footrest, and then switch to the other foot often      ? Try not to sit for long periods of time  When you do, sit in a straight-backed chair with your feet flat on the floor  Never reach, pull, or push while you are sitting      · Do exercises that strengthen your back muscles  Warm up before you exercise  Ask your healthcare provider the best exercises for you           · Maintain a healthy weight  Ask your healthcare provider what a healthy weight is for you  Ask him or her to help you create a weight loss plan if you are overweight      Follow up with your doctor as directed:  Return for a follow-up visit if you still have pain after 1 to 3 weeks of treatment  You may need to visit an orthopedist if your back pain lasts longer than 12 weeks  Write down your questions so you remember to ask them during your visits  © Copyright [employer] 2022 Information is for End User's use only and may not be sold, redistributed or otherwise used for commercial purposes  All illustrations and images included in CareNotes® are the copyrighted property of A D A M , Inc  or [employer]mi Sher   The above information is an  only  It is not intended as medical advice for individual conditions or treatments  Talk to your doctor, nurse or pharmacist before following any medical regimen to see if it is safe and effective for you          To present to the ER if symptoms worsen    Chief Complaint     Chief Complaint   Patient presents with    Sciatica     Left x 1 day         History of Present Illness   Alia Belle presents to the clinic c/o    Back Pain  This is a new problem  The current episode started more than 1 year ago (two years ago, on and off, flaring worse the past 2 days)  The problem is unchanged  The pain is present in the lumbar spine  The quality of the pain is described as stabbing (sharp)  Radiates to: left posterior and lateral leg  The pain is at a severity of 8/10  The pain is severe  The symptoms are aggravated by bending and position  Associated symptoms include tingling (in the toes at times)  Pertinent negatives include no abdominal pain, bladder incontinence, bowel incontinence, chest pain, fever, headaches, numbness, paresis, paresthesias or weakness  He has tried analgesics (tylenol) for the symptoms  The treatment provided no relief  Review of Systems   Review of Systems   Constitutional: Negative for chills, diaphoresis, fatigue and fever  HENT: Negative for congestion, ear discharge, ear pain and facial swelling  Eyes: Negative for photophobia, pain, discharge, redness, itching and visual disturbance  Respiratory: Negative for apnea, cough, chest tightness, shortness of breath and wheezing  Cardiovascular: Negative for chest pain and palpitations  Gastrointestinal: Negative for abdominal pain and bowel incontinence  Genitourinary: Negative for bladder incontinence  Musculoskeletal: Positive for back pain  Skin: Negative for color change, rash and wound  Neurological: Positive for tingling (in the toes at times)  Negative for dizziness, weakness, numbness, headaches and paresthesias  Hematological: Negative for adenopathy           Current Medications     Long-Term Medications   Medication Sig Dispense Refill    cyclobenzaprine (FLEXERIL) 10 mg tablet Take 1 tablet (10 mg total) by mouth 3 (three) times a day as needed for muscle spasms (when not driving) 20 tablet 0    fluticasone (FLONASE) 50 mcg/act nasal spray 2 sprays into each nostril daily 48 g 1    levocetirizine (XYZAL) 5 MG tablet Take 1 tablet (5 mg total) by mouth daily 90 tablet 1    montelukast (SINGULAIR) 10 mg tablet Take 1 tablet (10 mg total) by mouth daily at bedtime 90 tablet 1    naproxen (Naprosyn) 500 mg tablet Take 1 tablet (500 mg total) by mouth 2 (two) times a day with meals for 7 days 14 tablet 0    olopatadine (PATANOL) 0 1 % ophthalmic solution Administer 1 drop to both eyes 2 (two) times a day 15 mL 1       Current Allergies     Allergies as of 06/12/2022    (No Known Allergies)            The following portions of the patient's history were reviewed and updated as appropriate: allergies, current medications, past family history, past medical history, past social history, past surgical history and problem list   Past Medical History:   Diagnosis Date    Allergic     Asthma     Stomach pain      Past Surgical History:   Procedure Laterality Date    WRIST ARTHROTOMY Left      Social History     Socioeconomic History    Marital status: Single     Spouse name: Not on file    Number of children: Not on file    Years of education: Not on file    Highest education level: Not on file   Occupational History    Not on file   Tobacco Use    Smoking status: Never Smoker    Smokeless tobacco: Never Used   Vaping Use    Vaping Use: Never used   Substance and Sexual Activity    Alcohol use: Yes     Comment: occ    Drug use: Not Currently     Types: Marijuana    Sexual activity: Not on file   Other Topics Concern    Not on file   Social History Narrative    Single,     Travel to [facility]     Social Determinants of Health     Financial Resource Strain: Not on file   Food Insecurity: Not on file   Transportation Needs: Not on file   Physical Activity: Not on file   Stress: Not on file   Social Connections: Not on file   Intimate Partner Violence: Not on file   Housing Stability: Not on file Objective   /62   Pulse 87   Temp 97 7 °F (36 5 °C)   Resp 18   Wt 89 8 kg (198 lb)   SpO2 98%   BMI 30 11 kg/m²      Physical Exam     Physical Exam  Vitals and nursing note reviewed  Constitutional:       General: He is not in acute distress  Appearance: He is well-developed  He is not diaphoretic  HENT:      Head: Normocephalic and atraumatic  Right Ear: External ear normal       Left Ear: External ear normal       Nose: Nose normal    Eyes:      General: No scleral icterus  Right eye: No discharge  Left eye: No discharge  Conjunctiva/sclera: Conjunctivae normal    Cardiovascular:      Rate and Rhythm: Normal rate and regular rhythm  Heart sounds: Normal heart sounds  No murmur heard  No friction rub  No gallop  Pulmonary:      Effort: Pulmonary effort is normal  No respiratory distress  Breath sounds: Normal breath sounds  No decreased breath sounds, wheezing, rhonchi or rales  Abdominal:      General: Bowel sounds are normal       Palpations: Abdomen is soft  Tenderness: There is no abdominal tenderness  There is no guarding  Musculoskeletal:      Lumbar back: Spasms (lower L lumbar) present  No swelling, edema, deformity, signs of trauma or bony tenderness  Normal range of motion  Positive left straight leg raise test  Negative right straight leg raise test    Skin:     General: Skin is warm and dry  Coloration: Skin is not pale  Findings: No erythema or rash  Neurological:      Mental Status: He is alert and oriented to person, place, and time  Psychiatric:         Behavior: Behavior normal          Thought Content:  Thought content normal          Judgment: Judgment normal          Cheryl Peter PA-C

## 2022-06-12 NOTE — LETTER
June 12, 2022     Patient: Lottie Parr  YOB: 1995  Date of Visit: 6/12/2022      To Whom it May Concern:    Lottie Parr is under my professional care  Salmacandace Sidney was seen in my office on 6/12/2022  Marckkaylynn Little may return to work on 6/14  If you have any questions or concerns, please don't hesitate to call           Sincerely,          Elta Bence, PA-C        CC: No Recipients

## 2022-06-28 ENCOUNTER — CONSULT (OUTPATIENT)
Dept: PULMONOLOGY | Facility: CLINIC | Age: 27
End: 2022-06-28
Payer: COMMERCIAL

## 2022-06-28 ENCOUNTER — OFFICE VISIT (OUTPATIENT)
Dept: URGENT CARE | Facility: CLINIC | Age: 27
End: 2022-06-28
Payer: COMMERCIAL

## 2022-06-28 ENCOUNTER — TELEPHONE (OUTPATIENT)
Dept: GASTROENTEROLOGY | Facility: CLINIC | Age: 27
End: 2022-06-28

## 2022-06-28 ENCOUNTER — APPOINTMENT (OUTPATIENT)
Dept: LAB | Facility: CLINIC | Age: 27
End: 2022-06-28
Payer: COMMERCIAL

## 2022-06-28 VITALS
BODY MASS INDEX: 30.77 KG/M2 | HEART RATE: 84 BPM | WEIGHT: 203 LBS | OXYGEN SATURATION: 99 % | DIASTOLIC BLOOD PRESSURE: 72 MMHG | SYSTOLIC BLOOD PRESSURE: 128 MMHG | HEIGHT: 68 IN

## 2022-06-28 VITALS
WEIGHT: 203 LBS | BODY MASS INDEX: 30.77 KG/M2 | OXYGEN SATURATION: 99 % | HEIGHT: 68 IN | TEMPERATURE: 98.2 F | DIASTOLIC BLOOD PRESSURE: 74 MMHG | RESPIRATION RATE: 18 BRPM | HEART RATE: 80 BPM | SYSTOLIC BLOOD PRESSURE: 134 MMHG

## 2022-06-28 DIAGNOSIS — R06.2 WHEEZING: Primary | ICD-10-CM

## 2022-06-28 DIAGNOSIS — J45.41 MODERATE PERSISTENT ASTHMA WITH ACUTE EXACERBATION: ICD-10-CM

## 2022-06-28 DIAGNOSIS — J45.41 MODERATE PERSISTENT ASTHMA WITH ACUTE EXACERBATION: Primary | ICD-10-CM

## 2022-06-28 LAB
BASOPHILS # BLD AUTO: 0.05 THOUSANDS/ΜL (ref 0–0.1)
BASOPHILS NFR BLD AUTO: 1 % (ref 0–1)
EOSINOPHIL # BLD AUTO: 0.55 THOUSAND/ΜL (ref 0–0.61)
EOSINOPHIL NFR BLD AUTO: 8 % (ref 0–6)
ERYTHROCYTE [DISTWIDTH] IN BLOOD BY AUTOMATED COUNT: 13.2 % (ref 11.6–15.1)
HCT VFR BLD AUTO: 42.8 % (ref 36.5–49.3)
HGB BLD-MCNC: 14.1 G/DL (ref 12–17)
IMM GRANULOCYTES # BLD AUTO: 0.01 THOUSAND/UL (ref 0–0.2)
IMM GRANULOCYTES NFR BLD AUTO: 0 % (ref 0–2)
LYMPHOCYTES # BLD AUTO: 2.06 THOUSANDS/ΜL (ref 0.6–4.47)
LYMPHOCYTES NFR BLD AUTO: 28 % (ref 14–44)
MCH RBC QN AUTO: 28.5 PG (ref 26.8–34.3)
MCHC RBC AUTO-ENTMCNC: 32.9 G/DL (ref 31.4–37.4)
MCV RBC AUTO: 87 FL (ref 82–98)
MONOCYTES # BLD AUTO: 0.56 THOUSAND/ΜL (ref 0.17–1.22)
MONOCYTES NFR BLD AUTO: 8 % (ref 4–12)
NEUTROPHILS # BLD AUTO: 4.05 THOUSANDS/ΜL (ref 1.85–7.62)
NEUTS SEG NFR BLD AUTO: 55 % (ref 43–75)
NRBC BLD AUTO-RTO: 0 /100 WBCS
PLATELET # BLD AUTO: 348 THOUSANDS/UL (ref 149–390)
PMV BLD AUTO: 10.8 FL (ref 8.9–12.7)
RBC # BLD AUTO: 4.94 MILLION/UL (ref 3.88–5.62)
WBC # BLD AUTO: 7.28 THOUSAND/UL (ref 4.31–10.16)

## 2022-06-28 PROCEDURE — 36415 COLL VENOUS BLD VENIPUNCTURE: CPT

## 2022-06-28 PROCEDURE — 85025 COMPLETE CBC W/AUTO DIFF WBC: CPT

## 2022-06-28 PROCEDURE — 86003 ALLG SPEC IGE CRUDE XTRC EA: CPT

## 2022-06-28 PROCEDURE — 99204 OFFICE O/P NEW MOD 45 MIN: CPT | Performed by: INTERNAL MEDICINE

## 2022-06-28 PROCEDURE — 82785 ASSAY OF IGE: CPT

## 2022-06-28 PROCEDURE — 99213 OFFICE O/P EST LOW 20 MIN: CPT | Performed by: NURSE PRACTITIONER

## 2022-06-28 RX ORDER — IPRATROPIUM BROMIDE AND ALBUTEROL SULFATE 2.5; .5 MG/3ML; MG/3ML
3 SOLUTION RESPIRATORY (INHALATION) EVERY 6 HOURS PRN
Qty: 360 ML | Refills: 5 | Status: SHIPPED | OUTPATIENT
Start: 2022-06-28 | End: 2022-09-26

## 2022-06-28 RX ORDER — IPRATROPIUM BROMIDE AND ALBUTEROL SULFATE 2.5; .5 MG/3ML; MG/3ML
3 SOLUTION RESPIRATORY (INHALATION) ONCE
Status: COMPLETED | OUTPATIENT
Start: 2022-06-28 | End: 2022-06-28

## 2022-06-28 RX ORDER — PREDNISONE 20 MG/1
TABLET ORAL
Qty: 19 TABLET | Refills: 0 | Status: SHIPPED | OUTPATIENT
Start: 2022-06-28 | End: 2022-07-11

## 2022-06-28 RX ORDER — PREDNISONE 20 MG/1
20 TABLET ORAL 2 TIMES DAILY WITH MEALS
Qty: 10 TABLET | Refills: 0 | Status: SHIPPED | OUTPATIENT
Start: 2022-06-28 | End: 2022-06-28

## 2022-06-28 RX ORDER — PREDNISONE 20 MG/1
TABLET ORAL
Qty: 19 TABLET | Refills: 0 | Status: SHIPPED | OUTPATIENT
Start: 2022-06-28 | End: 2022-06-28

## 2022-06-28 RX ADMIN — IPRATROPIUM BROMIDE AND ALBUTEROL SULFATE 3 ML: 2.5; .5 SOLUTION RESPIRATORY (INHALATION) at 10:32

## 2022-06-28 NOTE — PROGRESS NOTES
Pulmonary Consultation   Genny Sevilla 31 y o  male MRN: [id number]  6/28/2022      Assessment:  Moderate persistent asthma   · Now with acute exacerbation, frequent nocturnal symptoms and wheezing   · Improved since nebulize treatment given earlier today at the urgent care clinic   · This is likely allergic type 2 asthma, last IgE count was 410 in 2020 with eosinophilia at 1300 cells at that time   · Not on maintenance therapy    Plan:   · Start steroid taper, prednisone 40 mg for 5 days and taper by 10 mg every 3 days to stop   · Breo Ellipta 200, given samples  · Reviewed the proper inhaler use technique  · Continue p r n  albuterol, added home DuoNeb q 6 p r n  · Continue Singulair 10 mg HS  · Counseled extensively about avoiding allergens  · Check [facility] allergy panel/CBC with differential for possible need of biologics in the future    Follow up July 11th       History of Present Illness     New Consult for:  Asthma      Background  31 y o  male with a h/o class 1 obesity, asthma, allergic rhinitis, IBS  Patient presented today for initial evaluation by pulmonary    Reported having asthma since early childhood  Last hospitalization/oral steroid use was about 1 year ago  No history of mechanical ventilation  He moved to this area about 3 months ago  Noted worsening asthma symptoms of chest tightness, wheezing, nocturnal cough this allergy season  Over the past week, had frequent nocturnal symptoms and wheezing  Seen in the urgent care clinic earlier today, given DuoNeb session that helped a lot  Not on maintenance inhaler, only using p r n  albuterol for the past few years last was on Breo Ellipta years ago  Known triggers of cats, pollen, dusts  No recent change in the environment, moving, or exposure to a sick contact    Review of Systems  As per hpi, all other systems reviewed and were negative      Social history  Born in [place], then lived/raised in [facility] [place] to [place] in 2017 and to [place] 3 months ago  Nonsmoker however smoked CBD for 4 years quit 1 months ago  Nonalcoholic  Works at [employer] now  Has 1 puppy for 6 months, reports to be hypo allergic  Studies:  Imaging and other studies: I have personally reviewed pertinent films in PACS      Pulmonary function testing:   Non on file    EKG, Pathology, and Other Studies: I have personally reviewed pertinent reports  Allergy testing 02/25/2020-  + almonds, peanut, white egg, cell mom, Shannon nut, care melt    Total IgE 410    CBC 06/09/2016-Absolute eosinophilia 1300 cells    Historical Information   Past Medical History:   Diagnosis Date    Allergic     Asthma     Stomach pain      Past Surgical History:   Procedure Laterality Date    WRIST ARTHROTOMY Left      Family History   Problem Relation Age of Onset    Hypertension Mother     Alcohol abuse Father     Other Father         Tobacco abuse         Medications/Allergies: Reviewed    Vitals: Blood pressure 128/72, pulse 84, height 5' 8" (1 727 m), weight 92 1 kg (203 lb), SpO2 99 %  Body mass index is 30 87 kg/m²  Oxygen Therapy  SpO2: 99 %      Physical Exam  Body mass index is 30 87 kg/m²     Gen: not in acute distress,   Neck/Eyes: supple, no adenopathy, PERRL  Ear: normal appearance, no significant hearing impairment  Nose:  normal nasal mucosa, no drainage  Mouth:  unremarkable/normal appearance of lips, teeth and gums  Oropharynx: mucosa is moist, no focal lesions or erythema  Salivary glands: soft nontender  Chest: normal respiratory efforts, clear breath sounds bilaterally  CV: RRR, no murmurs appreciated, no edema  Abdomen: soft, non tender  Extremities:  No observed deformity   Skin: unremarkable  Neuro: AAO X3, no focal motor deficit         Labs:  Lab Results   Component Value Date    WBC 6 22 02/25/2020    HGB 15 4 02/25/2020    HCT 46 8 02/25/2020    MCV 90 02/25/2020     02/25/2020     Lab Results   Component Value Date CALCIUM 9 2 02/25/2020    K 3 8 02/25/2020    CO2 32 02/25/2020     02/25/2020    BUN 13 02/25/2020    CREATININE 1 05 02/25/2020     Lab Results   Component Value Date     (H) 02/25/2020     Lab Results   Component Value Date    ALT 41 02/25/2020    AST 19 02/25/2020    ALKPHOS 82 02/25/2020           Portions of the record may have been created with voice recognition software  Occasional wrong word or "sound a like" substitutions may have occurred due to the inherent limitations of voice recognition software  Read the chart carefully and recognize, using context, where substitutions have occurred    [facility]

## 2022-06-28 NOTE — TELEPHONE ENCOUNTER
Pt walked into office to be seen this morning- no availability this morning in Providers schedule  Pt recently moved here from [place]  Pt stated he was having trouble today, was going to schedule a NP appt for next week but Pt declined as he wanted something now  Encourgaged Pt to go across the Parking Lot and be seen at [facility]

## 2022-06-28 NOTE — PATIENT INSTRUCTIONS
Take medication as directed  Rest and drink plenty of fluids  A cool mist humidifier can be helpful  If you develop a worsening cough, chest pain, shortness of breath, palpitations, coughing up blood, prolonged high fever, any new or concerning symptoms please return or proceed ER    Recommend following up with PCP in 3-5 days    Follow up with pulmonology as previously scheduled

## 2022-06-28 NOTE — PROGRESS NOTES
[facility]        NAME: Salbador Dakin is a 31 y o  male  : 1995    MRN: [id number]  DATE: 2022  TIME: 3:04 PM    Assessment and Plan   Wheezing [R06 2]  1  Wheezing  ipratropium-albuterol (DUO-NEB) 0 5-2 5 mg/3 mL inhalation solution 3 mL    Mini neb    DISCONTINUED: predniSONE 20 mg tablet     Mini neb  Performed by: SUSAN Dupont  Authorized by: SUSAN Dupont   Universal Protocol:  Procedure performed by:  Consent: Verbal consent obtained  Risks and benefits: risks, benefits and alternatives were discussed  Consent given by: patient  Time out: Immediately prior to procedure a "time out" was called to verify the correct patient, procedure, equipment, support staff and site/side marked as required  Patient understanding: patient states understanding of the procedure being performed  Patient identity confirmed: verbally with patient      Number of treatments:  1  Treatment 1:   Pre-Procedure     Symptoms:  Wheezing and cough    Lung Sounds:  Expiratory wheezing    Medication Administered:  Duoneb - Albuterol 2 5 mg/Atrovent 0 5 mg  Post-Procedure     Lung sounds:  Mild expiratory wheezing          Patient Instructions     Patient Instructions   Take medication as directed  Rest and drink plenty of fluids  A cool mist humidifier can be helpful  If you develop a worsening cough, chest pain, shortness of breath, palpitations, coughing up blood, prolonged high fever, any new or concerning symptoms please return or proceed ER  Recommend following up with PCP in 3-5 days    Follow up with pulmonology as previously scheduled      Follow up with PCP in 3-5 days  Proceed to  ER if symptoms worsen  Chief Complaint     Chief Complaint   Patient presents with    Asthma     Hx of asthma  Has gotten worse the last two days  Has been using a nebulizer last night         History of Present Illness       Asthma  He complains of chest tightness, cough and wheezing   There is no difficulty breathing, frequent throat clearing, hemoptysis, hoarse voice, shortness of breath or sputum production  This is a recurrent problem  The current episode started yesterday  The problem occurs constantly  The problem has been unchanged  The cough is non-productive  Pertinent negatives include no chest pain, dyspnea on exertion, ear pain, fever, headaches, heartburn, myalgias, nasal congestion, orthopnea, postnasal drip, rhinorrhea, sore throat, sweats or trouble swallowing  His symptoms are aggravated by nothing  His symptoms are alleviated by beta-agonist  He reports minimal improvement on treatment  His past medical history is significant for asthma  Review of Systems   Review of Systems   Constitutional: Negative for chills, diaphoresis, fatigue and fever  HENT: Negative for congestion, ear pain, hoarse voice, postnasal drip, rhinorrhea, sore throat and trouble swallowing  Respiratory: Positive for cough and wheezing  Negative for hemoptysis, sputum production, choking, chest tightness, shortness of breath and stridor  Cardiovascular: Negative for chest pain, dyspnea on exertion and palpitations  Gastrointestinal: Negative for heartburn  Musculoskeletal: Negative for myalgias  Neurological: Negative for headaches           Current Medications       Current Outpatient Medications:     albuterol (Ventolin HFA) 90 mcg/act inhaler, Inhale 2 puffs every 6 (six) hours as needed for shortness of breath, Disp: 18 g, Rfl: 2    cyclobenzaprine (FLEXERIL) 10 mg tablet, Take 1 tablet (10 mg total) by mouth 3 (three) times a day as needed for muscle spasms (when not driving), Disp: 20 tablet, Rfl: 0    fluticasone (FLONASE) 50 mcg/act nasal spray, 2 sprays into each nostril daily, Disp: 48 g, Rfl: 1    montelukast (SINGULAIR) 10 mg tablet, Take 1 tablet (10 mg total) by mouth daily at bedtime, Disp: 90 tablet, Rfl: 1    fluticasone-vilanterol (Breo Ellipta) 200-25 MCG/INH inhaler, Inhale 1 puff daily Rinse mouth after use , Disp: 60 blister, Rfl: 0    ipratropium-albuterol (DUO-NEB) 0 5-2 5 mg/3 mL nebulizer solution, Take 3 mL by nebulization every 6 (six) hours as needed for wheezing or shortness of breath, Disp: 360 mL, Rfl: 5    naproxen (Naprosyn) 500 mg tablet, Take 1 tablet (500 mg total) by mouth 2 (two) times a day with meals for 7 days, Disp: 14 tablet, Rfl: 0    olopatadine (PATANOL) 0 1 % ophthalmic solution, Administer 1 drop to both eyes 2 (two) times a day (Patient not taking: No sig reported), Disp: 15 mL, Rfl: 1    predniSONE 20 mg tablet, Take 2 tablets (40 mg total) by mouth daily for 5 days, THEN 1 5 tablets (30 mg total) daily for 3 days, THEN 1 tablet (20 mg total) daily for 3 days, THEN 0 5 tablets (10 mg total) daily for 3 days  , Disp: 19 tablet, Rfl: 0  No current facility-administered medications for this visit  Current Allergies     Allergies as of 06/28/2022    (No Known Allergies)            The following portions of the patient's history were reviewed and updated as appropriate: allergies, current medications, past family history, past medical history, past social history, past surgical history and problem list      Past Medical History:   Diagnosis Date    Allergic     Asthma     Stomach pain        Past Surgical History:   Procedure Laterality Date    WRIST ARTHROTOMY Left        Family History   Problem Relation Age of Onset    Hypertension Mother     Alcohol abuse Father     Other Father         Tobacco abuse         Medications have been verified  Objective   /74   Pulse 80   Temp 98 2 °F (36 8 °C)   Resp 18   Ht 5' 8" (1 727 m)   Wt 92 1 kg (203 lb)   SpO2 99%   BMI 30 87 kg/m²   No LMP for male patient  Physical Exam     Physical Exam  Constitutional:       General: He is not in acute distress  Appearance: He is well-developed  HENT:      Head: Normocephalic and atraumatic        Right Ear: Hearing, tympanic membrane, ear canal and external ear normal       Left Ear: Hearing, tympanic membrane, ear canal and external ear normal       Nose: Nose normal       Right Sinus: No maxillary sinus tenderness or frontal sinus tenderness  Left Sinus: No maxillary sinus tenderness or frontal sinus tenderness  Mouth/Throat:      Mouth: Mucous membranes are moist       Pharynx: Oropharynx is clear  Uvula midline  No oropharyngeal exudate or posterior oropharyngeal erythema  Tonsils: No tonsillar exudate or tonsillar abscesses  1+ on the right  1+ on the left  Cardiovascular:      Rate and Rhythm: Normal rate and regular rhythm  Heart sounds: Normal heart sounds, S1 normal and S2 normal    Pulmonary:      Effort: Pulmonary effort is normal       Breath sounds: Normal air entry  Examination of the right-middle field reveals wheezing  Examination of the left-middle field reveals wheezing  Examination of the right-lower field reveals wheezing  Examination of the left-lower field reveals wheezing  Wheezing (expiratory) present  Lymphadenopathy:      Cervical: No cervical adenopathy  Skin:     General: Skin is warm and dry  Capillary Refill: Capillary refill takes less than 2 seconds  Neurological:      Mental Status: He is alert and oriented to person, place, and time

## 2022-06-30 LAB
A ALTERNATA IGE QN: <0.1 KUA/I
A FUMIGATUS IGE QN: <0.1 KUA/I
BERMUDA GRASS IGE QN: 1.21 KUA/I
BOXELDER IGE QN: 0.49 KUA/I
C HERBARUM IGE QN: <0.1 KUA/I
CAT DANDER IGE QN: 10.7 KUA/I
CMN PIGWEED IGE QN: 1.56 KUA/I
COMMON RAGWEED IGE QN: 0.7 KUA/I
COTTONWOOD IGE QN: 0.4 KUA/I
D FARINAE IGE QN: 10.1 KUA/I
D PTERONYSS IGE QN: 13 KUA/I
DOG DANDER IGE QN: 52.8 KUA/I
LONDON PLANE IGE QN: 3.65 KUA/I
MOUSE URINE PROT IGE QN: <0.1 KUA/I
MT JUNIPER IGE QN: 0.26 KUA/I
MUGWORT IGE QN: 0.27 KUA/I
P NOTATUM IGE QN: <0.1 KUA/I
ROACH IGE QN: 0.64 KUA/I
SHEEP SORREL IGE QN: 0.8 KUA/I
SILVER BIRCH IGE QN: 0.54 KUA/I
TIMOTHY IGE QN: 0.36 KUA/I
TOTAL IGE SMQN RAST: 826 KU/L (ref 0–113)
WALNUT IGE QN: 0.48 KUA/I
WHITE ASH IGE QN: 1.36 KUA/I
WHITE ELM IGE QN: 2.72 KUA/I
WHITE MULBERRY IGE QN: 1.81 KUA/I
WHITE OAK IGE QN: 0.34 KUA/I

## 2022-07-07 NOTE — PROGRESS NOTES
Pulmonary Follow Up Note   Landon Marie 31 y o  male MRN: [id number]  7/11/2022    Assessment:  Moderate persistent asthma   · Allergic type 2, with peripheral eosinophilia and multiple allergens on [place] panel  · Outpatient treatment for exacerbation at the end of June  · Asthma control test score today of 19/25    Plan:   · Switched to [id number], Khalif Wan is about 300 dollar cost after insurance   · Given 1 more sample of the Breo in case, will give us a call if Advair is not covered, will consider Dulera 200, or Symbicort 160 after  · Continue p r n  albuterol HFA, DuoNeb advised to use as needed only  · Restart Singulair 10 mg HS  · Counseled about avoiding allergens    Return in about 2 months (around 9/11/2022)  History of Present Illness     Follow up for: Moderate persistent asthma    Background:  31 y o  male with a h/o class 1 obesity, asthma, allergic rhinitis, IBS  Reported having asthma since early childhood  No history of mechanical ventilation  He moved to this area about in 03/2022  Noted worsening asthma symptoms of chest tightness, wheezing, nocturnal cough this allergy season  first seen by Pulmonary on 06/28/2022 for recurrent exacerbation, treated as an outpatient  Noted had frequent nocturnal symptoms and wheezing      6/28 visit-started steroid taper, Breo Ellipta 200/Given samples continue Singulair 10 mg HS  [place] allergy panel with multiple allergens, peripheral eosinophilia highest at 1:00 p m  cells in 2020  Interval History  Feeling a lot better on current regimen  Did not have to use p r n  albuterol HFA, only uses DuoNeb at night just in case but did not have any symptoms when he did not use it for few days  No nocturnal symptoms  Currently on Breo Ellipta 200 only, did not start the Singulair  Asthma control test score of 19 today        Review of Systems  As per hpi, all other systems reviewed and were negative    Studies:    Imaging and other studies: I have personally reviewed pertinent films in PACS      Pulmonary function testing:       EKG, Pathology, and Other Studies: I have personally reviewed pertinent reports  Allergy testing 02/25/2020-  + almonds, peanut, white egg, cell mom, Shannon nut, care melt     Total IgE 410     CBC 06/09/2016-Absolute eosinophilia 1300 cells       Past medical, surgical, social and family histories reviewed  Medications/Allergies: Reviewed      Vitals: Blood pressure 138/72, pulse 74, temperature 98 4 °F (36 9 °C), temperature source Tympanic, resp  rate 18, height 5' 8" (1 727 m), weight 90 7 kg (200 lb), SpO2 98 %  Body mass index is 30 41 kg/m²  Oxygen Therapy  SpO2: 98 %      Physical Exam  Body mass index is 30 41 kg/m²  Gen: not in acute distress,   Neck/Eyes: supple, no adenopathy, PERRL  Ear: normal appearance, no significant hearing impairment  Nose:  normal nasal mucosa, no drainage  Mouth:  unremarkable/normal appearance of lips, teeth and gums  Oropharynx: mucosa is moist, no focal lesions or erythema  Salivary glands: soft nontender  Chest: normal respiratory efforts, clear breath sounds bilaterally  CV: RRR, no murmurs appreciated, no edema  Abdomen: soft, non tender  Extremities:  No observed deformity   Skin: unremarkable  Neuro: AAO X3, no focal motor deficit        Labs:  Lab Results   Component Value Date    WBC 7 28 06/28/2022    HGB 14 1 06/28/2022    HCT 42 8 06/28/2022    MCV 87 06/28/2022     06/28/2022     Lab Results   Component Value Date    CALCIUM 9 2 02/25/2020    K 3 8 02/25/2020    CO2 32 02/25/2020     02/25/2020    BUN 13 02/25/2020    CREATININE 1 05 02/25/2020     Lab Results   Component Value Date     (H) 06/28/2022     Lab Results   Component Value Date    ALT 41 02/25/2020    AST 19 02/25/2020    ALKPHOS 82 02/25/2020           Portions of the record may have been created with voice recognition software    Occasional wrong word or "sound a like" substitutions may have occurred due to the inherent limitations of voice recognition software  Read the chart carefully and recognize, using context, where substitutions have occurred    [facility]

## 2022-07-11 ENCOUNTER — OFFICE VISIT (OUTPATIENT)
Dept: PULMONOLOGY | Facility: CLINIC | Age: 27
End: 2022-07-11
Payer: COMMERCIAL

## 2022-07-11 VITALS
BODY MASS INDEX: 30.31 KG/M2 | TEMPERATURE: 98.4 F | HEIGHT: 68 IN | OXYGEN SATURATION: 98 % | RESPIRATION RATE: 18 BRPM | SYSTOLIC BLOOD PRESSURE: 138 MMHG | HEART RATE: 74 BPM | WEIGHT: 200 LBS | DIASTOLIC BLOOD PRESSURE: 72 MMHG

## 2022-07-11 DIAGNOSIS — J45.41 MODERATE PERSISTENT ASTHMA WITH ACUTE EXACERBATION: Primary | ICD-10-CM

## 2022-07-11 DIAGNOSIS — J30.89 SEASONAL ALLERGIC RHINITIS DUE TO OTHER ALLERGIC TRIGGER: ICD-10-CM

## 2022-07-11 PROCEDURE — 99213 OFFICE O/P EST LOW 20 MIN: CPT | Performed by: INTERNAL MEDICINE

## 2022-07-11 RX ORDER — FLUTICASONE PROPIONATE AND SALMETEROL 250; 50 UG/1; UG/1
1 POWDER RESPIRATORY (INHALATION) 2 TIMES DAILY
Qty: 60 BLISTER | Refills: 11 | Status: SHIPPED | OUTPATIENT
Start: 2022-07-11 | End: 2022-08-10

## 2022-07-11 RX ORDER — MONTELUKAST SODIUM 10 MG/1
10 TABLET ORAL
Qty: 90 TABLET | Refills: 11 | Status: SHIPPED | OUTPATIENT
Start: 2022-07-11

## 2022-07-29 ENCOUNTER — TELEPHONE (OUTPATIENT)
Dept: PULMONOLOGY | Facility: CLINIC | Age: 27
End: 2022-07-29

## 2022-07-29 NOTE — TELEPHONE ENCOUNTER
Chart notes from 7/11/22:  "Plan:   · Switched to [address], David Martin is about 300 dollar cost after insurance   · Given 1 more sample of the Breo in case, will give us a call if Advair is not covered, will consider Dulera 200, or Symbicort 160 after "    Should I have patient call his insurance to see if Dulera 200 or Symbicort 160 is cheaper?

## 2022-07-29 NOTE — TELEPHONE ENCOUNTER
Patient is calling, he said the Paulie Dock was very expensive at the pharmacy  He was told to call the office if that was the case and Macy Walker was going to send something else in   Please advise

## 2022-07-29 NOTE — TELEPHONE ENCOUNTER
There's no way for us to know what is affordable and what isn't on our side  It would help if he called his insurance and got a list of alternatives and I could choose the most appropriate one from there

## 2022-08-10 ENCOUNTER — APPOINTMENT (OUTPATIENT)
Dept: RADIOLOGY | Facility: CLINIC | Age: 27
End: 2022-08-10
Payer: COMMERCIAL

## 2022-08-10 ENCOUNTER — OFFICE VISIT (OUTPATIENT)
Dept: URGENT CARE | Facility: CLINIC | Age: 27
End: 2022-08-10
Payer: COMMERCIAL

## 2022-08-10 VITALS
SYSTOLIC BLOOD PRESSURE: 139 MMHG | TEMPERATURE: 98.3 F | HEART RATE: 90 BPM | DIASTOLIC BLOOD PRESSURE: 77 MMHG | HEIGHT: 67 IN | OXYGEN SATURATION: 96 % | RESPIRATION RATE: 24 BRPM | WEIGHT: 205 LBS | BODY MASS INDEX: 32.18 KG/M2

## 2022-08-10 DIAGNOSIS — B34.9 ACUTE VIRAL SYNDROME: ICD-10-CM

## 2022-08-10 DIAGNOSIS — J45.21 MILD INTERMITTENT ASTHMA WITH ACUTE EXACERBATION: ICD-10-CM

## 2022-08-10 DIAGNOSIS — B34.9 ACUTE VIRAL SYNDROME: Primary | ICD-10-CM

## 2022-08-10 LAB
SARS-COV-2 AG UPPER RESP QL IA: NEGATIVE
VALID CONTROL: NORMAL

## 2022-08-10 PROCEDURE — 87811 SARS-COV-2 COVID19 W/OPTIC: CPT | Performed by: PHYSICIAN ASSISTANT

## 2022-08-10 PROCEDURE — 71046 X-RAY EXAM CHEST 2 VIEWS: CPT

## 2022-08-10 PROCEDURE — 99213 OFFICE O/P EST LOW 20 MIN: CPT | Performed by: PHYSICIAN ASSISTANT

## 2022-08-10 RX ORDER — PREDNISONE 20 MG/1
TABLET ORAL
Qty: 15 TABLET | Refills: 0 | Status: SHIPPED | OUTPATIENT
Start: 2022-08-10

## 2022-08-10 RX ORDER — ALBUTEROL SULFATE 2.5 MG/3ML
2.5 SOLUTION RESPIRATORY (INHALATION) EVERY 6 HOURS PRN
Qty: 3 ML | Refills: 0 | Status: SHIPPED | OUTPATIENT
Start: 2022-08-10 | End: 2022-09-09

## 2022-08-10 NOTE — PROGRESS NOTES
[facility]        NAME: Sarai Garvin is a 31 y o  male  : 1995    MRN: [id number]  DATE: August 10, 2022  TIME: 5:41 PM    Assessment and Plan   Acute viral syndrome [B34 9]  1  Acute viral syndrome  XR chest pa & lateral    Poct Covid 19 Rapid Antigen Test   2  Mild intermittent asthma with acute exacerbation  XR chest pa & lateral    Poct Covid 19 Rapid Antigen Test    predniSONE 20 mg tablet    albuterol (2 5 mg/3 mL) 0 083 % nebulizer solution         Patient Instructions     1  Use albuterol inhaler and your nebulizer as directed  2  Use over-the-counter plain Mucinex or Robitussin for phlegm relief  3  Increase oral fluids  4  Use over-the-counter ibuprofen or acetaminophen as needed for fever pain  5  Go to the ER with any worsening symptoms  Chief Complaint   No chief complaint on file  History of Present Illness       60-year-old male patient with a 4 day history of worsening shortness of breath, wheezing, chills, tactile fever, fatigue  Patient states symptoms began after being at a  in close contact with many people in [place] about 5-7 days ago  Patient does have an asthma history but typically does not have the infectious symptoms when he gets asthma exacerbation  States that he does have a phlegmy/productive cough with his shortness of breath  There is also sharp anterior chest pain with deep breath and cough  Denies any GI symptoms  Review of Systems   Review of Systems   Constitutional: Positive for chills, fatigue and fever  HENT: Positive for congestion  Negative for ear pain and sore throat  Eyes: Negative for pain and visual disturbance  Respiratory: Positive for cough, chest tightness, shortness of breath and wheezing  Cardiovascular: Positive for chest pain  Negative for palpitations  Gastrointestinal: Negative for abdominal pain and vomiting  Genitourinary: Negative for dysuria and hematuria     Musculoskeletal: Positive for arthralgias  Negative for back pain  Skin: Negative for color change and rash  Neurological: Negative for seizures and syncope  All other systems reviewed and are negative  Current Medications       Current Outpatient Medications:     albuterol (2 5 mg/3 mL) 0 083 % nebulizer solution, Take 3 mL (2 5 mg total) by nebulization every 6 (six) hours as needed for wheezing or shortness of breath, Disp: 3 mL, Rfl: 0    albuterol (Ventolin HFA) 90 mcg/act inhaler, Inhale 2 puffs every 6 (six) hours as needed for shortness of breath, Disp: 18 g, Rfl: 2    ipratropium-albuterol (DUO-NEB) 0 5-2 5 mg/3 mL nebulizer solution, Take 3 mL by nebulization every 6 (six) hours as needed for wheezing or shortness of breath, Disp: 360 mL, Rfl: 5    predniSONE 20 mg tablet, Take 3 tabs all at once daily for 3 days then 2 tabs for 2 days then 1 tab for 2 days  , Disp: 15 tablet, Rfl: 0    cyclobenzaprine (FLEXERIL) 10 mg tablet, Take 1 tablet (10 mg total) by mouth 3 (three) times a day as needed for muscle spasms (when not driving) (Patient not taking: No sig reported), Disp: 20 tablet, Rfl: 0    fluticasone (FLONASE) 50 mcg/act nasal spray, 2 sprays into each nostril daily (Patient not taking: Reported on 8/10/2022), Disp: 48 g, Rfl: 1    Fluticasone-Salmeterol (Wixela Inhub) 250-50 mcg/dose inhaler, Inhale 1 puff 2 (two) times a day Rinse mouth after use   (Patient not taking: Reported on 8/10/2022), Disp: 60 blister, Rfl: 11    montelukast (SINGULAIR) 10 mg tablet, Take 1 tablet (10 mg total) by mouth daily at bedtime (Patient not taking: Reported on 8/10/2022), Disp: 90 tablet, Rfl: 11    olopatadine (PATANOL) 0 1 % ophthalmic solution, Administer 1 drop to both eyes 2 (two) times a day (Patient not taking: No sig reported), Disp: 15 mL, Rfl: 1    Current Allergies     Allergies as of 08/10/2022    (No Known Allergies)            The following portions of the patient's history were reviewed and updated as appropriate: allergies, current medications, past family history, past medical history, past social history, past surgical history and problem list      Past Medical History:   Diagnosis Date    Allergic     Asthma     Stomach pain        Past Surgical History:   Procedure Laterality Date    WRIST ARTHROTOMY Left        Family History   Problem Relation Age of Onset    Hypertension Mother     Alcohol abuse Father     Other Father         Tobacco abuse         Medications have been verified  Objective   /77   Pulse 90   Temp 98 3 °F (36 8 °C) (Oral)   Resp (!) 24   Ht 5' 7" (1 702 m)   Wt 93 kg (205 lb)   SpO2 96%   BMI 32 11 kg/m²        Physical Exam     Physical Exam  Vitals and nursing note reviewed  Constitutional:       General: He is not in acute distress  Appearance: He is not ill-appearing  HENT:      Head: Normocephalic and atraumatic  Nose: Congestion present  No rhinorrhea  Mouth/Throat:      Mouth: Mucous membranes are dry  Pharynx: Posterior oropharyngeal erythema present  Eyes:      Conjunctiva/sclera: Conjunctivae normal       Pupils: Pupils are equal, round, and reactive to light  Cardiovascular:      Rate and Rhythm: Normal rate and regular rhythm  Pulses: Normal pulses  Pulmonary:      Effort: Tachypnea present  Breath sounds: Decreased air movement present  Wheezing present  No rhonchi  Abdominal:      Tenderness: There is no abdominal tenderness  Musculoskeletal:         General: Normal range of motion  Cervical back: Normal range of motion and neck supple  No rigidity  Skin:     General: Skin is warm and dry  Capillary Refill: Capillary refill takes less than 2 seconds  Neurological:      Mental Status: He is alert and oriented to person, place, and time     Psychiatric:         Mood and Affect: Mood normal          Behavior: Behavior normal        Preliminary reading of chest x-ray:   No acute infiltrates or effusions seen

## 2022-08-10 NOTE — PATIENT INSTRUCTIONS
1  Use albuterol inhaler and your nebulizer as directed  2  Use over-the-counter plain Mucinex or Robitussin for phlegm relief  3  Increase oral fluids  4  Use over-the-counter ibuprofen or acetaminophen as needed for fever pain  5  Go to the ER with any worsening symptoms

## 2022-10-11 PROBLEM — H10.13 ALLERGIC CONJUNCTIVITIS OF BOTH EYES: Status: RESOLVED | Noted: 2022-04-27 | Resolved: 2022-10-11

## 2023-02-15 ENCOUNTER — OFFICE VISIT (OUTPATIENT)
Dept: URGENT CARE | Facility: CLINIC | Age: 28
End: 2023-02-15

## 2023-02-15 VITALS
HEART RATE: 85 BPM | TEMPERATURE: 98.1 F | RESPIRATION RATE: 20 BRPM | WEIGHT: 205 LBS | HEIGHT: 67 IN | DIASTOLIC BLOOD PRESSURE: 80 MMHG | SYSTOLIC BLOOD PRESSURE: 143 MMHG | OXYGEN SATURATION: 96 % | BODY MASS INDEX: 32.18 KG/M2

## 2023-02-15 DIAGNOSIS — J45.21 MILD INTERMITTENT ASTHMA WITH ACUTE EXACERBATION: Primary | ICD-10-CM

## 2023-02-15 RX ORDER — IPRATROPIUM BROMIDE AND ALBUTEROL SULFATE 2.5; .5 MG/3ML; MG/3ML
3 SOLUTION RESPIRATORY (INHALATION) 4 TIMES DAILY
Qty: 84 ML | Refills: 0 | Status: SHIPPED | OUTPATIENT
Start: 2023-02-15 | End: 2023-02-22

## 2023-02-15 RX ORDER — PREDNISONE 10 MG/1
TABLET ORAL
Qty: 26 TABLET | Refills: 0 | Status: SHIPPED | OUTPATIENT
Start: 2023-02-15

## 2023-02-15 RX ORDER — ALBUTEROL SULFATE 90 UG/1
2 AEROSOL, METERED RESPIRATORY (INHALATION) EVERY 6 HOURS PRN
Qty: 8.5 G | Refills: 0 | Status: SHIPPED | OUTPATIENT
Start: 2023-02-15

## 2023-02-15 NOTE — PROGRESS NOTES
[address] [facility]      NAME: Yamile Ontiveros is a 28 y o  male  : 1995    MRN: [id number]  DATE: February 15, 2023  TIME: 11:03 AM    Assessment and Plan   Mild intermittent asthma with acute exacerbation [J45 21]  1  Mild intermittent asthma with acute exacerbation  albuterol (ProAir HFA) 90 mcg/act inhaler    ipratropium-albuterol (DUO-NEB) 0 5-2 5 mg/3 mL nebulizer solution    predniSONE 10 mg tablet          Patient Instructions   Steroid for acute flare  Refill of meds provided  To call pulmonologist today to make apt in next 2-3 days  Patient agreeable to plan  To present to the ER if symptoms worsen  Chief Complaint     Chief Complaint   Patient presents with   • Shortness of Breath     Patient c/o SOB and cough that started 3 weeks ago when running out of asthma medication  History of Present Illness   Yamile Ontiveros presents to the clinic c/o  - at home covid test  Here for refill of meds  Nonsmoker  No recent travel  Shortness of Breath  The current episode started 1 to 4 weeks ago (worse since breo ran out (cannot afford it at this time))  The problem occurs constantly  The problem has been gradually worsening since onset  Associated symptoms include wheezing  Pertinent negatives include no chest pain, coughing, dizziness, fatigue or palpitations  Nothing aggravates the symptoms  There was no intake of a foreign body  Past treatments include one or more prescription drugs  Review of Systems   Review of Systems   Constitutional: Negative for chills, diaphoresis, fatigue and fever  HENT: Negative for congestion, ear discharge, ear pain and facial swelling  Eyes: Negative for photophobia, pain, discharge, redness, itching and visual disturbance  Respiratory: Positive for shortness of breath and wheezing  Negative for apnea, cough and chest tightness  Cardiovascular: Negative for chest pain and palpitations  Gastrointestinal: Negative for abdominal pain     Skin: Negative for color change, rash and wound  Neurological: Negative for dizziness and headaches  Hematological: Negative for adenopathy  Current Medications     Long-Term Medications   Medication Sig Dispense Refill   • cyclobenzaprine (FLEXERIL) 10 mg tablet Take 1 tablet (10 mg total) by mouth 3 (three) times a day as needed for muscle spasms (when not driving) (Patient not taking: Reported on 7/11/2022) 20 tablet 0   • fluticasone (FLONASE) 50 mcg/act nasal spray 2 sprays into each nostril daily (Patient not taking: Reported on 8/10/2022) 48 g 1   • Fluticasone-Salmeterol (Wixela Inhub) 250-50 mcg/dose inhaler Inhale 1 puff 2 (two) times a day Rinse mouth after use  (Patient not taking: Reported on 8/10/2022) 60 blister 11   • montelukast (SINGULAIR) 10 mg tablet Take 1 tablet (10 mg total) by mouth daily at bedtime (Patient not taking: Reported on 8/10/2022) 90 tablet 11   • olopatadine (PATANOL) 0 1 % ophthalmic solution Administer 1 drop to both eyes 2 (two) times a day (Patient not taking: Reported on 6/28/2022) 15 mL 1   • [DISCONTINUED] fluticasone-vilanterol (Breo Ellipta) 200-25 MCG/INH inhaler Inhale 1 puff daily Rinse mouth after use   60 blister 0   • [DISCONTINUED] levocetirizine (XYZAL) 5 MG tablet Take 1 tablet (5 mg total) by mouth daily 90 tablet 1   • [DISCONTINUED] naproxen (Naprosyn) 500 mg tablet Take 1 tablet (500 mg total) by mouth 2 (two) times a day with meals for 7 days (Patient not taking: Reported on 7/11/2022) 14 tablet 0       Current Allergies     Allergies as of 02/15/2023   • (No Known Allergies)            The following portions of the patient's history were reviewed and updated as appropriate: allergies, current medications, past family history, past medical history, past social history, past surgical history and problem list   Past Medical History:   Diagnosis Date   • Allergic    • Asthma    • Stomach pain      Past Surgical History:   Procedure Laterality Date   • WRIST ARTHROTOMY Left      Social History     Socioeconomic History   • Marital status: Single     Spouse name: Not on file   • Number of children: Not on file   • Years of education: Not on file   • Highest education level: Not on file   Occupational History   • Not on file   Tobacco Use   • Smoking status: Former     Years: 0 00     Types: Cigarettes     Start date:      Quit date: 3/28/2018     Years since quittin 8   • Smokeless tobacco: Never   Vaping Use   • Vaping Use: Never used   Substance and Sexual Activity   • Alcohol use: Yes     Comment: occ   • Drug use: Not Currently     Types: Marijuana     Comment: Used for 5 years [id number]   • Sexual activity: Not on file   Other Topics Concern   • Not on file   Social History Narrative    Single,     Travel to [address] [place] [address] Way Determinants of Health     Financial Resource Strain: Not on file   Food Insecurity: Not on file   Transportation Needs: Not on file   Physical Activity: Not on file   Stress: Not on file   Social Connections: Not on file   Intimate Partner Violence: Not on file   Housing Stability: Not on file       Objective   /80   Pulse 85   Temp 98 1 °F (36 7 °C) (Temporal)   Resp 20   Ht 5' 7" (1 702 m)   Wt 93 kg (205 lb)   SpO2 96%   BMI 32 11 kg/m²      Physical Exam     Physical Exam  Vitals and nursing note reviewed  Constitutional:       General: He is not in acute distress  Appearance: He is well-developed  He is not diaphoretic  HENT:      Head: Normocephalic and atraumatic  Right Ear: Tympanic membrane and external ear normal       Left Ear: Tympanic membrane and external ear normal       Nose: Nose normal       Mouth/Throat:      Mouth: Mucous membranes are moist       Pharynx: No oropharyngeal exudate or posterior oropharyngeal erythema  Eyes:      General: No scleral icterus  Right eye: No discharge  Left eye: No discharge        Conjunctiva/sclera: Conjunctivae normal  Cardiovascular:      Rate and Rhythm: Normal rate and regular rhythm  Heart sounds: Normal heart sounds  No murmur heard  No friction rub  No gallop  Pulmonary:      Effort: Pulmonary effort is normal  No respiratory distress  Breath sounds: Wheezing (diffuse expiratory) present  No decreased breath sounds, rhonchi or rales  Skin:     General: Skin is warm and dry  Coloration: Skin is not pale  Findings: No erythema or rash  Neurological:      Mental Status: He is alert and oriented to person, place, and time  Psychiatric:         Behavior: Behavior normal          Thought Content:  Thought content normal          Judgment: Judgment normal          Emir Castellano PA-C

## 2023-05-10 ENCOUNTER — OFFICE VISIT (OUTPATIENT)
Dept: URGENT CARE | Facility: CLINIC | Age: 28
End: 2023-05-10

## 2023-05-10 VITALS
RESPIRATION RATE: 18 BRPM | OXYGEN SATURATION: 97 % | HEART RATE: 87 BPM | HEIGHT: 67 IN | TEMPERATURE: 98.7 F | BODY MASS INDEX: 32.18 KG/M2 | DIASTOLIC BLOOD PRESSURE: 79 MMHG | SYSTOLIC BLOOD PRESSURE: 139 MMHG | WEIGHT: 205 LBS

## 2023-05-10 DIAGNOSIS — M54.42 ACUTE LEFT-SIDED LOW BACK PAIN WITH LEFT-SIDED SCIATICA: Primary | ICD-10-CM

## 2023-05-10 RX ORDER — PREDNISONE 10 MG/1
TABLET ORAL
Qty: 26 TABLET | Refills: 0 | Status: SHIPPED | OUTPATIENT
Start: 2023-05-10

## 2023-05-10 RX ORDER — CYCLOBENZAPRINE HCL 10 MG
10 TABLET ORAL 3 TIMES DAILY PRN
Qty: 20 TABLET | Refills: 0 | Status: SHIPPED | OUTPATIENT
Start: 2023-05-10

## 2023-05-10 NOTE — LETTER
May 10, 2023     Patient: Dung Thomas  YOB: 1995  Date of Visit: 5/10/2023      To Whom it May Concern:    Dung Thomas is under my professional care  Odell Palma was seen in my office on 5/10/2023  Odell Palma may return to work on 5/11  If you have any questions or concerns, please don't hesitate to call           Sincerely,          Jensen Rouse PA-C        CC: No Recipients

## 2023-05-10 NOTE — PROGRESS NOTES
[address] [facility]      NAME: Jesus Colon is a 28 y o  male  : 1995    MRN: [id number]  DATE: May 10, 2023  TIME: 6:15 PM    Assessment and Plan   Acute left-sided low back pain with left-sided sciatica [M54 42]  1  Acute left-sided low back pain with left-sided sciatica  Ambulatory Referral to Pain Management    predniSONE 10 mg tablet    cyclobenzaprine (FLEXERIL) 10 mg tablet          Patient Instructions   Referral to pm provided  Flexeril as prescribed when not driving  Prednisone as directed  Follow up with PM as referred  Patient agreeable to plan  To present to the ER if symptoms worsen  Chief Complaint     Chief Complaint   Patient presents with   • Back Pain     Patient c/o left lower back/hippain that started 2 days ago,denies injury  History of Present Illness   Jesus Colon presents to the clinic c/o    Back Pain  This is a recurrent problem  The current episode started in the past 7 days  The problem occurs constantly  The problem is unchanged  The pain is present in the lumbar spine (L>R)  The pain radiates to the left thigh  The pain is moderate  The symptoms are aggravated by bending, position and sitting  Associated symptoms include leg pain, numbness and tingling  Pertinent negatives include no abdominal pain, bladder incontinence, bowel incontinence, chest pain, fever or headaches  He has tried NSAIDs for the symptoms  The treatment provided mild relief  Review of Systems   Review of Systems   Constitutional: Negative for chills, diaphoresis, fatigue and fever  HENT: Negative for congestion, ear discharge, ear pain and facial swelling  Eyes: Negative for photophobia, pain, discharge, redness, itching and visual disturbance  Respiratory: Negative for apnea, cough, chest tightness, shortness of breath and wheezing  Cardiovascular: Negative for chest pain and palpitations  Gastrointestinal: Negative for abdominal pain and bowel incontinence  Genitourinary: Negative for bladder incontinence  Musculoskeletal: Positive for back pain  Skin: Negative for color change, rash and wound  Neurological: Positive for tingling and numbness  Negative for dizziness and headaches  Hematological: Negative for adenopathy  Current Medications     Long-Term Medications   Medication Sig Dispense Refill   • cyclobenzaprine (FLEXERIL) 10 mg tablet Take 1 tablet (10 mg total) by mouth 3 (three) times a day as needed for muscle spasms (when not driving) 20 tablet 0   • cyclobenzaprine (FLEXERIL) 10 mg tablet Take 1 tablet (10 mg total) by mouth 3 (three) times a day as needed for muscle spasms (when not driving) (Patient not taking: Reported on 7/11/2022) 20 tablet 0   • fluticasone (FLONASE) 50 mcg/act nasal spray 2 sprays into each nostril daily (Patient not taking: Reported on 8/10/2022) 48 g 1   • Fluticasone-Salmeterol (Wixela Inhub) 250-50 mcg/dose inhaler Inhale 1 puff 2 (two) times a day Rinse mouth after use  (Patient not taking: Reported on 8/10/2022) 60 blister 11   • montelukast (SINGULAIR) 10 mg tablet Take 1 tablet (10 mg total) by mouth daily at bedtime (Patient not taking: Reported on 8/10/2022) 90 tablet 11   • olopatadine (PATANOL) 0 1 % ophthalmic solution Administer 1 drop to both eyes 2 (two) times a day (Patient not taking: Reported on 6/28/2022) 15 mL 1   • [DISCONTINUED] fluticasone-vilanterol (Breo Ellipta) 200-25 MCG/INH inhaler Inhale 1 puff daily Rinse mouth after use   60 blister 0   • [DISCONTINUED] levocetirizine (XYZAL) 5 MG tablet Take 1 tablet (5 mg total) by mouth daily 90 tablet 1   • [DISCONTINUED] naproxen (Naprosyn) 500 mg tablet Take 1 tablet (500 mg total) by mouth 2 (two) times a day with meals for 7 days (Patient not taking: Reported on 7/11/2022) 14 tablet 0       Current Allergies     Allergies as of 05/10/2023   • (No Known Allergies)            The following portions of the patient's history were reviewed and "updated as appropriate: allergies, current medications, past family history, past medical history, past social history, past surgical history and problem list   Past Medical History:   Diagnosis Date   • Allergic    • Asthma    • Stomach pain      Past Surgical History:   Procedure Laterality Date   • WRIST ARTHROTOMY Left      Social History     Socioeconomic History   • Marital status: Single     Spouse name: Not on file   • Number of children: Not on file   • Years of education: Not on file   • Highest education level: Not on file   Occupational History   • Not on file   Tobacco Use   • Smoking status: Former     Years: 0 00     Types: Cigarettes     Start date:      Quit date: 3/28/2018     Years since quittin 1   • Smokeless tobacco: Never   Vaping Use   • Vaping Use: Never used   Substance and Sexual Activity   • Alcohol use: Yes     Comment: occ   • Drug use: Not Currently     Types: Marijuana     Comment: Used for 5 years [id number]   • Sexual activity: Not on file   Other Topics Concern   • Not on file   Social History Narrative    Single,     Travel to [address] [place] [address] Way Determinants of Health     Financial Resource Strain: Not on file   Food Insecurity: Not on file   Transportation Needs: Not on file   Physical Activity: Not on file   Stress: Not on file   Social Connections: Not on file   Intimate Partner Violence: Not on file   Housing Stability: Not on file       Objective   /79   Pulse 87   Temp 98 7 °F (37 1 °C) (Temporal)   Resp 18   Ht 5' 7\" (1 702 m)   Wt 93 kg (205 lb)   SpO2 97%   BMI 32 11 kg/m²      Physical Exam     Physical Exam  Vitals and nursing note reviewed  Constitutional:       General: He is not in acute distress  Appearance: He is well-developed  He is not diaphoretic  HENT:      Head: Normocephalic and atraumatic        Right Ear: External ear normal       Left Ear: External ear normal       Nose: Nose normal       Mouth/Throat:      Mouth: Mucous " membranes are moist       Pharynx: No oropharyngeal exudate or posterior oropharyngeal erythema  Eyes:      General: No scleral icterus  Right eye: No discharge  Left eye: No discharge  Conjunctiva/sclera: Conjunctivae normal    Cardiovascular:      Rate and Rhythm: Normal rate and regular rhythm  Heart sounds: Normal heart sounds  No murmur heard  No friction rub  No gallop  Pulmonary:      Effort: Pulmonary effort is normal  No respiratory distress  Breath sounds: Normal breath sounds  No decreased breath sounds, wheezing, rhonchi or rales  Musculoskeletal:      Lumbar back: Spasms and tenderness (left lower L3-S1) present  Decreased range of motion  Positive left straight leg raise test    Skin:     General: Skin is warm and dry  Coloration: Skin is not pale  Findings: No erythema or rash  Neurological:      Mental Status: He is alert and oriented to person, place, and time  Psychiatric:         Behavior: Behavior normal          Thought Content:  Thought content normal          Judgment: Judgment normal          Fer Vazquez PA-C

## 2023-06-07 ENCOUNTER — OFFICE VISIT (OUTPATIENT)
Dept: FAMILY MEDICINE CLINIC | Facility: CLINIC | Age: 28
End: 2023-06-07
Payer: COMMERCIAL

## 2023-06-07 VITALS
BODY MASS INDEX: 32.96 KG/M2 | SYSTOLIC BLOOD PRESSURE: 144 MMHG | OXYGEN SATURATION: 99 % | HEART RATE: 104 BPM | DIASTOLIC BLOOD PRESSURE: 80 MMHG | RESPIRATION RATE: 18 BRPM | TEMPERATURE: 97.8 F | WEIGHT: 210 LBS | HEIGHT: 67 IN

## 2023-06-07 DIAGNOSIS — M54.42 CHRONIC BILATERAL LOW BACK PAIN WITH LEFT-SIDED SCIATICA: Primary | ICD-10-CM

## 2023-06-07 DIAGNOSIS — G89.29 CHRONIC BILATERAL LOW BACK PAIN WITH LEFT-SIDED SCIATICA: Primary | ICD-10-CM

## 2023-06-07 PROCEDURE — 99213 OFFICE O/P EST LOW 20 MIN: CPT | Performed by: NURSE PRACTITIONER

## 2023-06-07 PROCEDURE — 96372 THER/PROPH/DIAG INJ SC/IM: CPT

## 2023-06-07 RX ORDER — KETOROLAC TROMETHAMINE 10 MG/1
10 TABLET, FILM COATED ORAL EVERY 6 HOURS PRN
Qty: 20 TABLET | Refills: 0 | Status: SHIPPED | OUTPATIENT
Start: 2023-06-07 | End: 2023-06-07 | Stop reason: SDUPTHER

## 2023-06-07 RX ORDER — METHOCARBAMOL 500 MG/1
500 TABLET, FILM COATED ORAL 3 TIMES DAILY
Qty: 30 TABLET | Refills: 0 | Status: SHIPPED | OUTPATIENT
Start: 2023-06-07 | End: 2023-06-14 | Stop reason: SDUPTHER

## 2023-06-07 RX ORDER — KETOROLAC TROMETHAMINE 10 MG/1
10 TABLET, FILM COATED ORAL EVERY 6 HOURS PRN
Qty: 20 TABLET | Refills: 0 | Status: SHIPPED | OUTPATIENT
Start: 2023-06-07 | End: 2023-06-14

## 2023-06-07 RX ORDER — KETOROLAC TROMETHAMINE 30 MG/ML
60 INJECTION, SOLUTION INTRAMUSCULAR; INTRAVENOUS ONCE
Status: COMPLETED | OUTPATIENT
Start: 2023-06-07 | End: 2023-06-07

## 2023-06-07 RX ORDER — METHOCARBAMOL 500 MG/1
500 TABLET, FILM COATED ORAL 3 TIMES DAILY
Qty: 30 TABLET | Refills: 0 | Status: SHIPPED | OUTPATIENT
Start: 2023-06-07 | End: 2023-06-07 | Stop reason: SDUPTHER

## 2023-06-07 RX ADMIN — KETOROLAC TROMETHAMINE 60 MG: 30 INJECTION, SOLUTION INTRAMUSCULAR; INTRAVENOUS at 13:30

## 2023-06-07 NOTE — LETTER
June 7, 2023     Patient: Nai Casas  YOB: 1995  Date of Visit: 6/7/2023      To Whom it May Concern:    Nai Casas is under my professional care  Candice Perez was seen in my office on 6/7/2023  Candice Perez may return to work with limitations no lifting >10 pounds, no repetitive bending, pushing, pulling for the next 2 weeks  Please excuse from work 6/7/23 and 6/8/23       If you have any questions or concerns, please don't hesitate to call           Sincerely,          SUSAN Pereira        CC:   No Recipients
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
## 2023-06-11 PROBLEM — J31.0 CHRONIC RHINITIS: Status: ACTIVE | Noted: 2017-04-12

## 2023-06-14 ENCOUNTER — OFFICE VISIT (OUTPATIENT)
Dept: FAMILY MEDICINE CLINIC | Facility: CLINIC | Age: 28
End: 2023-06-14
Payer: OTHER MISCELLANEOUS

## 2023-06-14 VITALS
DIASTOLIC BLOOD PRESSURE: 80 MMHG | RESPIRATION RATE: 16 BRPM | HEIGHT: 67 IN | OXYGEN SATURATION: 98 % | BODY MASS INDEX: 32.96 KG/M2 | WEIGHT: 210 LBS | HEART RATE: 97 BPM | TEMPERATURE: 98 F | SYSTOLIC BLOOD PRESSURE: 120 MMHG
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
## 2023-06-14 DIAGNOSIS — J31.0 CHRONIC RHINITIS: ICD-10-CM

## 2023-06-14 DIAGNOSIS — M54.42 CHRONIC BILATERAL LOW BACK PAIN WITH LEFT-SIDED SCIATICA: ICD-10-CM

## 2023-06-14 DIAGNOSIS — E66.9 OBESITY (BMI 30-39.9): ICD-10-CM

## 2023-06-14 DIAGNOSIS — J45.20 MILD INTERMITTENT ASTHMA WITHOUT COMPLICATION: ICD-10-CM

## 2023-06-14 DIAGNOSIS — M54.16 LUMBAR RADICULOPATHY: Primary | ICD-10-CM

## 2023-06-14 DIAGNOSIS — G89.29 CHRONIC BILATERAL LOW BACK PAIN WITH LEFT-SIDED SCIATICA: ICD-10-CM

## 2023-06-14 PROCEDURE — 99215 OFFICE O/P EST HI 40 MIN: CPT | Performed by: FAMILY MEDICINE

## 2023-06-14 RX ORDER — NAPROXEN 500 MG/1
500 TABLET ORAL 2 TIMES DAILY WITH MEALS
Qty: 60 TABLET | Refills: 0 | Status: SHIPPED | OUTPATIENT
Start: 2023-06-14

## 2023-06-14 RX ORDER — METHOCARBAMOL 500 MG/1
500 TABLET, FILM COATED ORAL
Qty: 30 TABLET | Refills: 1 | Status: SHIPPED | OUTPATIENT
Start: 2023-06-14

## 2023-06-14 NOTE — PROGRESS NOTES
"Assessment/Plan:    No problem-specific Assessment & Plan notes found for this encounter  Work related back pain with incident on 6/8/23  Exam and symptoms suspicious for L4 radiculopathy on left with SLR positive and weakness of left EHL 4/5    MRI pending  Continue naproxen  Refer to px mgmt  Refer to PT    Will do disability form as he states his employer will not offer any restricted or light duty and his HR told to Tim out for 1 month\"    Pt aware I believe he is not truly incapacitated and he does have ability to ambulate and work using upper extremities etc so could qualify for work restriction but as he reports above, will take out of work until 7/8/23 and fill out form accordingly    nsaid risks aware  May need ortho/spine referral if MRI abnormal, pending coverage     Diagnoses and all orders for this visit:    Lumbar radiculopathy  -     Ambulatory Referral to [facility]; Future  -     Ambulatory referral to Pain Management; Future  -     naproxen (Naprosyn) 500 mg tablet; Take 1 tablet (500 mg total) by mouth 2 (two) times a day with meals    Mild intermittent asthma without complication    Obesity (BMI 30-39  9)    Chronic rhinitis    Work related injury  -     Ambulatory Referral to [facility]; Future  -     Ambulatory referral to Pain Management; Future    Chronic bilateral low back pain with left-sided sciatica  -     methocarbamol (ROBAXIN) 500 mg tablet; Take 1 tablet (500 mg total) by mouth daily at bedtime as needed for muscle spasms        Return in about 1 month (around 7/14/2023)  Subjective:      Patient ID: Odell Anderson is a 28 y o  male      Chief Complaint   Patient presents with   • forms     wmcma       HPI  Injured at work last week  Hurt back  Warehouse work  Unloading trucks  Out since 6/8/23  Immediate pain  Low to mid back  Left side   Hx of sciatica  Spasms and still gets pops in back  [facility] a [facility] next day  Given robaxin and " "toradol  orlandogenesiswalt told him to see px mgmt and ordered MRI and scheduled and advised PT  HR told him he needs to be out? Until he is 100%  No light duty per pt  No PT yet  Saw maddison at [facility], in [address] [address] duty note last week    The following portions of the patient's history were reviewed and updated as appropriate: allergies, current medications, past family history, past medical history, past social history, past surgical history and problem list     Review of Systems   Constitutional: Negative for chills and fever  Genitourinary: Negative for difficulty urinating  Musculoskeletal: Positive for back pain  Current Outpatient Medications   Medication Sig Dispense Refill   • albuterol (ProAir HFA) 90 mcg/act inhaler Inhale 2 puffs every 6 (six) hours as needed for wheezing 8 5 g 0   • methocarbamol (ROBAXIN) 500 mg tablet Take 1 tablet (500 mg total) by mouth daily at bedtime as needed for muscle spasms 30 tablet 1   • naproxen (Naprosyn) 500 mg tablet Take 1 tablet (500 mg total) by mouth 2 (two) times a day with meals 60 tablet 0   • fluticasone (FLONASE) 50 mcg/act nasal spray 2 sprays into each nostril daily (Patient not taking: Reported on 8/10/2022) 48 g 1     No current facility-administered medications for this visit  Objective:    /80 (BP Location: Right arm, Patient Position: Sitting, Cuff Size: Standard)   Pulse 97   Temp 98 °F (36 7 °C) (Temporal)   Resp 16   Ht 5' 7\" (1 702 m)   Wt 95 3 kg (210 lb)   SpO2 98%   BMI 32 89 kg/m²        Physical Exam  Vitals and nursing note reviewed  Constitutional:       General: He is not in acute distress  Appearance: He is well-developed  He is not ill-appearing  HENT:      Head: Normocephalic  Right Ear: Tympanic membrane normal       Left Ear: Tympanic membrane normal    Eyes:      General: No scleral icterus       Conjunctiva/sclera: Conjunctivae normal    Cardiovascular:      Rate and Rhythm: Normal rate " and regular rhythm  Heart sounds: No murmur heard  Pulmonary:      Effort: Pulmonary effort is normal  No respiratory distress  Abdominal:      Palpations: Abdomen is soft  Musculoskeletal:         General: Tenderness present  No deformity  Cervical back: Neck supple  Right lower leg: No edema  Left lower leg: No edema  Comments: SLR positive on left  4/5 EHL strength on left   Skin:     General: Skin is warm and dry  Neurological:      Mental Status: He is alert  Motor: Weakness present  Deep Tendon Reflexes: Reflexes normal    Psychiatric:         Mood and Affect: Mood normal          Behavior: Behavior normal          Thought Content: Thought content normal          41 minutes spent with patient and with chart review and documentation completion           Oscar Bennett DO

## 2023-06-14 NOTE — LETTER
Arelis 15, 2023     Patient: Fatou Hughes  YOB: 1995  Date of Visit: 6/14/2023      To Whom it May Concern:    Fatou Hughes is under my professional care  Justyn Rodgers was seen in my office on 6/14/2023  If you have any questions or concerns, please don't hesitate to call           Sincerely,          Deejay Richey DO        CC: No Recipients

## 2023-06-15 ENCOUNTER — TELEPHONE (OUTPATIENT)
Dept: PHYSICAL THERAPY | Facility: OTHER | Age: 28
End: 2023-06-15

## 2023-06-15 ENCOUNTER — TELEPHONE (OUTPATIENT)
Dept: FAMILY MEDICINE CLINIC | Facility: CLINIC | Age: 28
End: 2023-06-15

## 2023-06-15 DIAGNOSIS — M54.16 LUMBAR RADICULOPATHY: Primary | ICD-10-CM

## 2023-06-15 PROBLEM — M54.42 CHRONIC BILATERAL LOW BACK PAIN WITH LEFT-SIDED SCIATICA: Status: ACTIVE | Noted: 2023-06-15

## 2023-06-15 PROBLEM — G89.29 CHRONIC BILATERAL LOW BACK PAIN WITH LEFT-SIDED SCIATICA: Status: ACTIVE | Noted: 2023-06-15

## 2023-06-15 NOTE — TELEPHONE ENCOUNTER
Returned patient's call he placed to [facility]  He informed me this is a work related injury  [facility] is unable to triage for that reason  A referral was placed for [facility], and info was given to the patient for the [place] location         [facility] closed

## 2023-06-15 NOTE — TELEPHONE ENCOUNTER
Call placed to the patient per [facility] referral     W/C RELATED INJURY  PER FM NOTE  V/M left for patient to call back  Phone number and hours of business provided,    This is the 1st attempt to reach the patient  Will defer referral per protocol

## 2023-06-21 ENCOUNTER — OFFICE VISIT (OUTPATIENT)
Dept: FAMILY MEDICINE CLINIC | Facility: CLINIC | Age: 28
End: 2023-06-21
Payer: OTHER MISCELLANEOUS

## 2023-06-21 ENCOUNTER — TELEPHONE (OUTPATIENT)
Dept: FAMILY MEDICINE CLINIC | Facility: CLINIC | Age: 28
End: 2023-06-21

## 2023-06-21 VITALS
HEART RATE: 92 BPM | RESPIRATION RATE: 16 BRPM | TEMPERATURE: 97.7 F | OXYGEN SATURATION: 98 % | BODY MASS INDEX: 33.74 KG/M2 | SYSTOLIC BLOOD PRESSURE: 110 MMHG | WEIGHT: 215 LBS | HEIGHT: 67 IN | DIASTOLIC BLOOD PRESSURE: 80 MMHG

## 2023-06-21 DIAGNOSIS — M54.16 LUMBAR RADICULOPATHY: Primary | ICD-10-CM
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
## 2023-06-21 PROCEDURE — 99214 OFFICE O/P EST MOD 30 MIN: CPT | Performed by: FAMILY MEDICINE

## 2023-06-21 RX ORDER — GABAPENTIN 300 MG/1
300 CAPSULE ORAL 2 TIMES DAILY
Qty: 60 CAPSULE | Refills: 5 | Status: SHIPPED | OUTPATIENT
Start: 2023-06-21 | End: 2023-06-24

## 2023-06-21 NOTE — TELEPHONE ENCOUNTER
Patient called into [facility] 06/21 @4:15pm requesting to schedule a PT appt  By the note from [facility] on 06/15  This is W/C related injury and patient was referred to [id number] [place] [address] MED  [id number] [place] [address] contacted the patient, they made a note in his chart stating patient had an appt at [facility] ([facility]) ? ?  referral was closed  I explained to the patient that if he would like to start PT his PCP can enter a DIRECT PT referral , he can call a PT office to schedule en eval  [place] [facility] [place] phone number and address were provided  Yeyo Cervantes He is also aware to have his claim info and adjusters name

## 2023-06-21 NOTE — TELEPHONE ENCOUNTER
Patient requesting a referral for [facility]    [address] [place], [place]    Please call patient when order is placed     Swati Dumont MA

## 2023-06-21 NOTE — TELEPHONE ENCOUNTER
Please inform him I placed a PT order in his chart but it is universal to wherever he wants to go and it is visible by all [facility]

## 2023-06-21 NOTE — PATIENT INSTRUCTIONS
Please start gabapentin 300mg at bedtime for 1 week then increase to twice a day  It is medication for nerve

## 2023-06-21 NOTE — PROGRESS NOTES
Assessment/Plan:    No problem-specific Assessment & Plan notes found for this encounter  Lumbar radiculopathy  Still out of work  Advised needs to start PT asap, call other facilities  Call px mgmt for appt, use other locations to be seen sooner     Seems to be improving somewhat since last exam  More strength in left EHL  Start gabapentin 300mg qhs for first week then bid   F/u 2w     Diagnoses and all orders for this visit:    Lumbar radiculopathy    Work related injury        Left EHL 4/5  SLR positive on left but seems better    No follow-ups on file  Subjective:      Patient ID: Keisha Vasquez is a 28 y o  male  Chief Complaint   Patient presents with   • Follow-up     2 week f/u-workers comp-wmcma       HPI  Pain is same  Sleeping ok  MRI done yesterday in [place] [facility], results pending  Left leg pain  Clicking still  First appt for PT not until July 27th or so  He called called px mgmt, no appt until 7/27/23    The following portions of the patient's history were reviewed and updated as appropriate: allergies, current medications, past family history, past medical history, past social history, past surgical history and problem list     Review of Systems   Constitutional: Negative for chills and fever  Genitourinary: Negative for difficulty urinating  No incontinence   Musculoskeletal: Positive for back pain           Current Outpatient Medications   Medication Sig Dispense Refill   • albuterol (ProAir HFA) 90 mcg/act inhaler Inhale 2 puffs every 6 (six) hours as needed for wheezing 8 5 g 0   • methocarbamol (ROBAXIN) 500 mg tablet Take 1 tablet (500 mg total) by mouth daily at bedtime as needed for muscle spasms 30 tablet 1   • naproxen (Naprosyn) 500 mg tablet Take 1 tablet (500 mg total) by mouth 2 (two) times a day with meals 60 tablet 0   • fluticasone (FLONASE) 50 mcg/act nasal spray 2 sprays into each nostril daily (Patient not taking: Reported on 8/10/2022) 48 g 1     No current "facility-administered medications for this visit  Objective:    /80 (BP Location: Right arm, Patient Position: Sitting, Cuff Size: Standard)   Pulse 92   Temp 97 7 °F (36 5 °C) (Temporal)   Resp 16   Ht 5' 7\" (1 702 m)   Wt 97 5 kg (215 lb)   SpO2 98%   BMI 33 67 kg/m²        Physical Exam  Vitals and nursing note reviewed  Constitutional:       General: He is not in acute distress  Appearance: He is well-developed  He is not ill-appearing  HENT:      Head: Normocephalic  Right Ear: Tympanic membrane normal       Left Ear: Tympanic membrane normal    Eyes:      General: No scleral icterus  Conjunctiva/sclera: Conjunctivae normal    Cardiovascular:      Rate and Rhythm: Normal rate and regular rhythm  Heart sounds: No murmur heard  Pulmonary:      Effort: Pulmonary effort is normal  No respiratory distress  Breath sounds: No wheezing  Abdominal:      Palpations: Abdomen is soft  Musculoskeletal:         General: No deformity  Cervical back: Neck supple  Right lower leg: No edema  Left lower leg: No edema  Skin:     General: Skin is warm and dry  Coloration: Skin is not pale  Neurological:      Mental Status: He is alert  Motor: Weakness present  Gait: Gait normal       Deep Tendon Reflexes: Reflexes normal       Comments: SLR pos on left but less intense pain and more localized to proximal left thigh   Psychiatric:         Behavior: Behavior normal          Thought Content:  Thought content normal                 Anette Vides DO    "

## 2023-07-04 ENCOUNTER — TELEPHONE (OUTPATIENT)
Dept: FAMILY MEDICINE CLINIC | Facility: CLINIC | Age: 28
End: 2023-07-04

## 2023-07-05 NOTE — TELEPHONE ENCOUNTER
Has f/u appt this week  I ordered MRI weeks ago  He said he had it done and had the CD with him but I told him I need the report so will wait to obtain it  I never received any MRI lumbar report    Can we get the report asap?  Not sure where he had it done but outside of [facility]

## 2023-07-05 NOTE — TELEPHONE ENCOUNTER
I left a message on patients phone to call us back.  I was going to ask the patient where he got his MRI done so we can call and get the report for it    Bob Balderas

## 2023-07-07 ENCOUNTER — OFFICE VISIT (OUTPATIENT)
Dept: FAMILY MEDICINE CLINIC | Facility: CLINIC | Age: 28
End: 2023-07-07
Payer: OTHER MISCELLANEOUS

## 2023-07-07 ENCOUNTER — TELEPHONE (OUTPATIENT)
Dept: FAMILY MEDICINE CLINIC | Facility: CLINIC | Age: 28
End: 2023-07-07

## 2023-07-07 VITALS
RESPIRATION RATE: 18 BRPM | DIASTOLIC BLOOD PRESSURE: 74 MMHG | TEMPERATURE: 98.8 F | SYSTOLIC BLOOD PRESSURE: 136 MMHG | HEART RATE: 94 BPM | BODY MASS INDEX: 34.3 KG/M2 | WEIGHT: 219 LBS
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
## 2023-07-07 DIAGNOSIS — M54.16 LUMBAR RADICULOPATHY: Primary | ICD-10-CM

## 2023-07-07 PROCEDURE — 99214 OFFICE O/P EST MOD 30 MIN: CPT | Performed by: FAMILY MEDICINE

## 2023-07-07 RX ORDER — GABAPENTIN 300 MG/1
300 CAPSULE ORAL 3 TIMES DAILY
Qty: 90 CAPSULE | Refills: 5 | Status: SHIPPED | OUTPATIENT
Start: 2023-07-07

## 2023-07-07 NOTE — TELEPHONE ENCOUNTER
Spoke th radiology medical records and they will be faxing the MRI report to us today. Please keep open to follow.   Cat Marie

## 2023-07-07 NOTE — LETTER
July 7, 2023     Patient: Ximena Lanza  YOB: 1995  Date of Visit: 7/7/2023      To Whom it May Concern:    Ximena Lanza is under my professional care. Preston Foster was seen in my office on 7/7/2023. For the next 30 days, he may work with the following restrictions:    May lift/carry up to 20-25# at a time. May bend/crawl/kneel as comfortable without excess. May operate machinery. If you have any questions or concerns, please don't hesitate to call.          Sincerely,          Mandy Guy DO        CC: No Recipients

## 2023-07-07 NOTE — PROGRESS NOTES
Assessment/Plan:    No problem-specific Assessment & Plan notes found for this encounter. Leg leg radiculopathy seems to be improving with less pain and better EHL strength now 4+/5  Continue gabapentin but increase dot 300mg tid  Can go back to work with restrictions  Continue with PT  Radiology dept contacted to fax MRI report to us since still not received  F/u 1m  I told him that I could extend his disability if necessary but feels its not currently necessary. Also his employer needs to agree with WC vs non WC injury so he can proceed with PT    Please increase the gabapentin to 300mg 3x/d and pursue Physical Therapy ASAP through your appropriate insurance. Diagnoses and all orders for this visit:    Lumbar radiculopathy  -     gabapentin (NEURONTIN) 300 mg capsule; Take 1 capsule (300 mg total) by mouth 3 (three) times a day    Work related injury  -     gabapentin (NEURONTIN) 300 mg capsule; Take 1 capsule (300 mg total) by mouth 3 (three) times a day    Other orders  -     Ketorolac Tromethamine (TORADOL ORAL PO); Take 10 mg by mouth        Return in about 2 weeks (around 7/21/2023) for Recheck. Subjective:      Patient ID: Ulysees Soulier is a 28 y.o. male. Chief Complaint   Patient presents with   • Follow-up   • Back Pain     Sas/cma       HPI  Back pain episode last week  Intense pain, severe pain down left leg lasted hours  Some good days  Gabapentin 300mg bid currently  Some upset stomach   Not groggy  Best day: still some tightness, some spasms, tingling w/o pain, 3/10 at best  No incontinence  Called 3 [facility] PT places, soonest 7/17 for appt  Still has not gone, insurance issues?   Issues at work with [employer], stating it is vs is not    Advised needs medical treatment regardless of payor  Supposed to go back to work this Monday  Drives, walks, lifting up to 40#  Bending/kneeling involved    The following portions of the patient's history were reviewed and updated as appropriate: allergies, current medications, past family history, past medical history, past social history, past surgical history and problem list.    Review of Systems   Constitutional: Negative for fever. Genitourinary: Negative for difficulty urinating. Musculoskeletal: Positive for back pain. Current Outpatient Medications   Medication Sig Dispense Refill   • albuterol (ProAir HFA) 90 mcg/act inhaler Inhale 2 puffs every 6 (six) hours as needed for wheezing 8.5 g 0   • gabapentin (NEURONTIN) 300 mg capsule Take 1 capsule (300 mg total) by mouth 3 (three) times a day 90 capsule 5   • Ketorolac Tromethamine (TORADOL ORAL PO) Take 10 mg by mouth     • methocarbamol (ROBAXIN) 500 mg tablet Take 1 tablet (500 mg total) by mouth daily at bedtime as needed for muscle spasms 30 tablet 1   • naproxen (Naprosyn) 500 mg tablet Take 1 tablet (500 mg total) by mouth 2 (two) times a day with meals 60 tablet 0     No current facility-administered medications for this visit. Objective:    /74   Pulse 94   Temp 98.8 °F (37.1 °C)   Resp 18   Wt 99.3 kg (219 lb)   BMI 34.30 kg/m²        Physical Exam  Vitals and nursing note reviewed. Constitutional:       Appearance: He is well-developed. He is obese. He is not ill-appearing. HENT:      Head: Normocephalic. Right Ear: Tympanic membrane normal.      Left Ear: Tympanic membrane normal.   Eyes:      General: No scleral icterus. Conjunctiva/sclera: Conjunctivae normal.   Cardiovascular:      Rate and Rhythm: Normal rate and regular rhythm. Pulmonary:      Effort: Pulmonary effort is normal. No respiratory distress. Breath sounds: No wheezing. Abdominal:      General: There is no distension. Palpations: Abdomen is soft. Tenderness: There is no abdominal tenderness. Musculoskeletal:         General: No deformity. Cervical back: Neck supple. Right lower leg: No edema. Left lower leg: No edema.       Comments: SLR neg b/l  Left EHL 4+/5   Skin:     General: Skin is warm and dry. Coloration: Skin is not pale. Neurological:      Mental Status: He is alert. Motor: No weakness. Gait: Gait normal.      Deep Tendon Reflexes: Reflexes normal.   Psychiatric:         Mood and Affect: Mood normal.         Behavior: Behavior normal.         Thought Content:  Thought content normal.                Sandra Head DO

## 2023-07-07 NOTE — PATIENT INSTRUCTIONS
Please increase the gabapentin to 300mg 3x/d and pursue Physical Therapy ASAP through your appropriate insurance.

## 2023-07-13 ENCOUNTER — TELEPHONE (OUTPATIENT)
Dept: FAMILY MEDICINE CLINIC | Facility: CLINIC | Age: 28
End: 2023-07-13

## 2023-07-13 NOTE — TELEPHONE ENCOUNTER
Patient called and left a message requesting Dr. Francheska Nair write a note stating he can go back to work on Monday lifting all restrictions. He will no longer be compensated and cannot afford to not work. He also does not want to lose his job.   Please call patient back at [phone]

## 2023-07-14 NOTE — TELEPHONE ENCOUNTER
Patient stopped in for letter. No limitations and return to work Monday. Please call as soon as this letter is complete.   Thank you

## 2023-07-27 ENCOUNTER — TRANSCRIBE ORDERS (OUTPATIENT)
Dept: PAIN MEDICINE | Facility: CLINIC | Age: 28
End: 2023-07-27

## 2023-07-27 ENCOUNTER — CONSULT (OUTPATIENT)
Dept: PAIN MEDICINE | Facility: CLINIC | Age: 28
End: 2023-07-27
Payer: OTHER MISCELLANEOUS

## 2023-07-27 VITALS
SYSTOLIC BLOOD PRESSURE: 139 MMHG | BODY MASS INDEX: 34.3 KG/M2 | HEART RATE: 68 BPM | DIASTOLIC BLOOD PRESSURE: 87 MMHG | WEIGHT: 219 LBS

## 2023-07-27 DIAGNOSIS — M62.838 MUSCLE SPASM: ICD-10-CM
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
## 2023-07-27 DIAGNOSIS — M48.061 LUMBAR FORAMINAL STENOSIS: Primary | ICD-10-CM

## 2023-07-27 DIAGNOSIS — M54.16 LUMBAR RADICULOPATHY: ICD-10-CM

## 2023-07-27 PROCEDURE — 99244 OFF/OP CNSLTJ NEW/EST MOD 40: CPT | Performed by: PHYSICAL MEDICINE & REHABILITATION

## 2023-07-27 RX ORDER — TIZANIDINE 4 MG/1
2 TABLET ORAL EVERY 8 HOURS PRN
Qty: 60 TABLET | Refills: 0 | Status: SHIPPED | OUTPATIENT
Start: 2023-07-27

## 2023-07-27 NOTE — PROGRESS NOTES
Assessment  1. Lumbar foraminal stenosis    2. Lumbar radiculopathy    3. Work related injury    4. Muscle spasm        Plan  Mr. Jenny Alfonso is a pleasant 76-year-old male significant past medical history of IBS, asthma and chronic low back pain presents to [facility] for initial evaluation regarding acute on chronic low back pain with radicular symptoms into the left leg after a work-related injury on June 7, 2023. During today's evaluation he is demonstrating clinical and diagnostic evidence of lumbar radiculopathy with MRI evidence of L4-L5 extraforaminal disc protrusion impacting the descending L4 nerve root and causing mild to moderate foraminal stenosis. He has already exhausted conservative approaches with physical therapy and home exercises and reports minimal relief from medication management. At this time interventional approaches will be beneficial and warranted. As such we will  1. Plan for a 1 level left-sided L4-L5 transforaminal epidural steroid injection under fluoroscopy guidance  2. We will also switch muscle relaxers to Zanaflex 2 mg every 8 hours as needed as he reports no relief on Robaxin. Complete risks and benefits including bleeding, infection, tissue reaction, nerve injury and allergic reaction were discussed. The approach was demonstrated using models and literature was provided. Verbal and written consent was obtained. My impressions and treatment recommendations were discussed in detail with the patient who verbalized understanding and had no further questions. Discharge instructions were provided. I personally saw and examined the patient and I agree with the above discussed plan of care.     Orders Placed This Encounter   Procedures   • FL spine and pain procedure     Standing Status:   Future     Standing Expiration Date:   7/27/2027     Order Specific Question:   Reason for Exam:     Answer:   Left sided L4 TFESI     Order Specific Question: Anticoagulant hold needed? Answer:   No     New Medications Ordered This Visit   Medications   • tiZANidine (Zanaflex) 4 mg tablet     Sig: Take 0.5 tablets (2 mg total) by mouth every 8 (eight) hours as needed for muscle spasms     Dispense:  60 tablet     Refill:  0       History of Present Illness    Germaine Vickers is a 28 y.o. male presents to [facility] for initial evaluation regarding acute low back pain with radiating symptoms into the left lower extremity that started after a work-related injury on June 7, 2023. As per patient he reports working for [employer] and while unloading a truck on June 7, 2023 experiencing acute low back pain while lifting a box (weight unknown) at which time patient reported feeling a cracking sound in his back and subsequently developing worsening low back pain that radiated into the left lower extremity. Subsequently notified his supervisor and was taken out of work. Reported to the hospital next day and was out of work for several weeks. Patient reports recently returning to work but continuing to experience low back pain with radicular symptoms into the left leg. He does report experiencing this pain but states the pain got progressively worse after the most recent incident on June 7. Today patient reports moderate to severe pain rated 10 out of 10 and interfere with activities. Pain is constant 100% of the time that is present throughout the day and night. Describes symptoms as burning, cramping, shooting, numbness, sharp, pressure-like, throbbing, dull aching pain. Also reports lower extremity weakness but denies recent falls. Does not use any durable medical equipment for ambulation. Symptoms are unchanged with position. Reports moderate relief with physical therapy, heat, chiropractic manipulation. Denies smoking. Reports occasional marijuana and alcohol use.   Currently taking Lidoderm patches, Tylenol, Motrin which provides some relief and previously tried Flexeril and gabapentin and oral steroids with no relief. Presents today for initial evaluation. I have personally reviewed and/or updated the patient's past medical history, past surgical history, family history, social history, current medications, allergies, and vital signs today. Review of Systems   Constitutional: Negative for fever and unexpected weight change. HENT: Negative for trouble swallowing. Eyes: Negative for visual disturbance. Respiratory: Positive for cough, shortness of breath and wheezing. Cardiovascular: Negative for chest pain and palpitations. Gastrointestinal: Negative for constipation, diarrhea, nausea and vomiting. Endocrine: Negative for cold intolerance, heat intolerance and polydipsia. Genitourinary: Negative for difficulty urinating and frequency. Musculoskeletal: Positive for back pain, gait problem and joint swelling. Negative for arthralgias and myalgias. Skin: Negative for rash. Neurological: Negative for dizziness, seizures, syncope, weakness and headaches. Hematological: Does not bruise/bleed easily. Psychiatric/Behavioral: Negative for dysphoric mood. All other systems reviewed and are negative. Patient Active Problem List   Diagnosis   • Chronic rhinitis   • Screening for cardiovascular, respiratory, and genitourinary diseases   • Screening for diabetes mellitus (DM)   • IBS (irritable bowel syndrome)   • Kerion of occipital region of scalp   • Mild intermittent asthma without complication   • Obesity (BMI 30-39. 9)   • Chronic constipation   • Healthcare maintenance   • Immunization due   • BMI 32.0-32.9,adult   • Lumbar radiculopathy   • Work related injury   • Chronic bilateral low back pain with left-sided sciatica       Past Medical History:   Diagnosis Date   • Allergic    • Asthma    • Stomach pain        Past Surgical History:   Procedure Laterality Date   • WRIST ARTHROTOMY Left        Family History Problem Relation Age of Onset   • Hypertension Mother    • Alcohol abuse Father    • Other Father         Tobacco abuse       Social History     Occupational History   • Not on file   Tobacco Use   • Smoking status: Former     Packs/day: 0.50     Years: 5.00     Total pack years: 2.50     Types: Cigarettes     Start date: 6/15/2013     Quit date: 3/28/2018     Years since quittin.3     Passive exposure: Past   • Smokeless tobacco: Never   Vaping Use   • Vaping Use: Never used   Substance and Sexual Activity   • Alcohol use: Yes     Comment: occ   • Drug use: Not Currently     Types: Marijuana     Comment: Used for 5 years [id number]   • Sexual activity: Not on file       Current Outpatient Medications on File Prior to Visit   Medication Sig   • albuterol (ProAir HFA) 90 mcg/act inhaler Inhale 2 puffs every 6 (six) hours as needed for wheezing   • gabapentin (NEURONTIN) 300 mg capsule Take 1 capsule (300 mg total) by mouth 3 (three) times a day   • Ketorolac Tromethamine (TORADOL ORAL PO) Take 10 mg by mouth   • naproxen (Naprosyn) 500 mg tablet Take 1 tablet (500 mg total) by mouth 2 (two) times a day with meals   • [DISCONTINUED] fluticasone-vilanterol (Breo Ellipta) 200-25 MCG/INH inhaler Inhale 1 puff daily Rinse mouth after use. • [DISCONTINUED] levocetirizine (XYZAL) 5 MG tablet Take 1 tablet (5 mg total) by mouth daily   • [DISCONTINUED] methocarbamol (ROBAXIN) 500 mg tablet Take 1 tablet (500 mg total) by mouth daily at bedtime as needed for muscle spasms     No current facility-administered medications on file prior to visit. No Known Allergies    Physical Exam    /87   Pulse 68   Wt 99.3 kg (219 lb)   BMI 34.30 kg/m²     Constitutional: normal, well developed, well nourished, alert, in no distress and non-toxic and no overt pain behavior.   Eyes: anicteric  HEENT: grossly intact  Neck: supple, symmetric, trachea midline and no masses   Pulmonary:even and unlabored  Cardiovascular:No edema or pitting edema present  Skin:Normal without rashes or lesions and well hydrated  Psychiatric:Mood and affect appropriate  Neurologic:Cranial Nerves II-XII grossly intact   Musculoskeletal:normal, tenderness to palpation left-sided lumbar paraspinals, decreased active and passive range of motion with lumbar flexion and extension limited by pain, MMT 5 out of 5 bilateral lower extremities, sensation grossly tact to light touch, positive straight leg raise in the supine position radicular pain into the left leg, DTRs within normal limits    Imaging

## 2023-07-28 ENCOUNTER — TELEPHONE (OUTPATIENT)
Dept: PAIN MEDICINE | Facility: CLINIC | Age: 28
End: 2023-07-28

## 2023-07-28 NOTE — TELEPHONE ENCOUNTER
Caller: patient    Doctor: lina    Reason for call: patient calling to schedule injection    Call back#: [phone]

## 2023-07-31 ENCOUNTER — OFFICE VISIT (OUTPATIENT)
Dept: FAMILY MEDICINE CLINIC | Facility: CLINIC | Age: 28
End: 2023-07-31
Payer: OTHER MISCELLANEOUS

## 2023-07-31 VITALS
WEIGHT: 216 LBS | SYSTOLIC BLOOD PRESSURE: 118 MMHG | RESPIRATION RATE: 18 BRPM | HEIGHT: 67 IN | DIASTOLIC BLOOD PRESSURE: 68 MMHG | BODY MASS INDEX: 33.9 KG/M2 | HEART RATE: 90 BPM | TEMPERATURE: 97.9 F
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
## 2023-07-31 DIAGNOSIS — M54.16 LUMBAR RADICULOPATHY: Primary | ICD-10-CM

## 2023-07-31 PROCEDURE — 99214 OFFICE O/P EST MOD 30 MIN: CPT | Performed by: FAMILY MEDICINE

## 2023-07-31 RX ORDER — GABAPENTIN 300 MG/1
CAPSULE ORAL
Qty: 120 CAPSULE | Refills: 5 | Status: SHIPPED | OUTPATIENT
Start: 2023-07-31

## 2023-07-31 NOTE — TELEPHONE ENCOUNTER
.Caller: pt    Doctor: Kelleen Gowers    Reason for call: Please reach back out to pt for scheduling      Call back#: [phone]

## 2023-07-31 NOTE — PROGRESS NOTES
Assessment/Plan:    No problem-specific Assessment & Plan notes found for this encounter. MRI proven left lumbar radiculopathy  Slowly improving   EHL strength on left 4+/5  Increase gabapentin 300mg tid to 300/300/600 if tolerated    F/u px mgmt for [facility] plan    Continue PT plan    Please increase the gabapentin from 300mg three times a day to 300/300/600. FYI the max dose is typically up to 600mg three times a day. Diagnoses and all orders for this visit:    Lumbar radiculopathy  -     gabapentin (NEURONTIN) 300 mg capsule; 300mg in am, 300mg in pm and 600mg at HS    Work related injury  -     gabapentin (NEURONTIN) 300 mg capsule; 300mg in am, 300mg in pm and 600mg at HS        Return in about 1 month (around 8/31/2023) for Recheck. Subjective:      Patient ID: Germaine Vickers is a 28 y.o. male. Chief Complaint   Patient presents with   • Madi Haganin     June 7th date of back injury   [employer] C[employer]        HPI  WC injury to back  HR cancelled his claim  Getting a  per pt in [facility], Gavi Guardado  Lost 3m of pay    Currently back at work but not his choice    Doing stretching  Inversion table  Still has not gone to PT because they did not offer sooner appt though he has tried  [facility] [place] [facility] [employer]  Has appt for [facility]  Still with sx  Left leg  Gets spasm though not as severe  No incontinence  Affects daily living  [place] with long standing, sitting and laying and bending  Work does accommodate him  Gabapentin helps, some grogginess    The following portions of the patient's history were reviewed and updated as appropriate: allergies, current medications, past family history, past medical history, past social history, past surgical history and problem list.    Review of Systems   Genitourinary: Negative for difficulty urinating. Musculoskeletal: Positive for back pain.          Current Outpatient Medications   Medication Sig Dispense Refill   • albuterol (ProAir HFA) 90 mcg/act inhaler Inhale 2 puffs every 6 (six) hours as needed for wheezing 8.5 g 0   • gabapentin (NEURONTIN) 300 mg capsule 300mg in am, 300mg in pm and 600mg at  capsule 5   • Ketorolac Tromethamine (TORADOL ORAL PO) Take 10 mg by mouth     • tiZANidine (Zanaflex) 4 mg tablet Take 0.5 tablets (2 mg total) by mouth every 8 (eight) hours as needed for muscle spasms 60 tablet 0     No current facility-administered medications for this visit. Objective:    /68   Pulse 90   Temp 97.9 °F (36.6 °C)   Resp 18   Ht 5' 7" (1.702 m)   Wt 98 kg (216 lb)   BMI 33.83 kg/m²        Physical Exam  Vitals and nursing note reviewed. Constitutional:       General: He is not in acute distress. Appearance: He is well-developed. He is obese. He is not ill-appearing. HENT:      Head: Normocephalic. Eyes:      General: No scleral icterus. Right eye: No discharge. Left eye: No discharge. Conjunctiva/sclera: Conjunctivae normal.   Cardiovascular:      Rate and Rhythm: Normal rate and regular rhythm. Heart sounds: No murmur heard. Pulmonary:      Effort: Pulmonary effort is normal. No respiratory distress. Breath sounds: No wheezing. Abdominal:      Palpations: Abdomen is soft. Musculoskeletal:         General: No deformity. Cervical back: Neck supple. Right lower leg: No edema. Left lower leg: No edema. Comments: SLR neg b/l   Skin:     General: Skin is warm and dry. Coloration: Skin is not pale. Neurological:      Mental Status: He is alert. Motor: Weakness present. Comments: Left EHL weaker   Psychiatric:         Mood and Affect: Mood normal.         Behavior: Behavior normal.         Thought Content:  Thought content normal.                Brendan Rockwell DO

## 2023-07-31 NOTE — PATIENT INSTRUCTIONS
Please increase the gabapentin from 300mg three times a day to 300/300/600. FYI the max dose is typically up to 600mg three times a day.

## 2023-07-31 NOTE — TELEPHONE ENCOUNTER
.Caller: pt    Doctor: Latosha Baig    Reason for call: Please reach back out to pt for scheduling      Call back#: [phone]

## 2023-08-02 NOTE — TELEPHONE ENCOUNTER
Caller: Patient     Doctor: Umesh العراقي     Reason for call: Provided patient with schedulers phone number     Call back#: [phone]

## 2023-08-21 ENCOUNTER — EVALUATION (OUTPATIENT)
Dept: PHYSICAL THERAPY | Facility: CLINIC | Age: 28
End: 2023-08-21
Payer: COMMERCIAL

## 2023-08-21 DIAGNOSIS — M54.16 LUMBAR RADICULOPATHY: Primary | ICD-10-CM

## 2023-08-21 PROCEDURE — 97162 PT EVAL MOD COMPLEX 30 MIN: CPT | Performed by: PHYSICAL THERAPIST

## 2023-08-21 PROCEDURE — 97110 THERAPEUTIC EXERCISES: CPT | Performed by: PHYSICAL THERAPIST

## 2023-08-21 NOTE — PROGRESS NOTES
PT Evaluation     Today's date: 2023  Patient name: Jeffrey Bar  : 1995  MRN: [id number]  Referring provider: Alesha Grewal DO  Dx:   Encounter Diagnosis     ICD-10-CM    1. Lumbar radiculopathy  M54.16 Ambulatory referral to Physical Therapy          Start Time: 1115  Stop Time: 1200  Total time in clinic (min): 45 minutes    Assessment  Assessment details: Jeffrey Bar was seen for an initial PT evaluation today. Patient is a 28 y.o. male with diagnosis of LBP with radiculopathy and past medical history significant for left wrist arthrotomy, asthma, IBS, obesity. Moderate complexity evaluation  due to number of participation restrictions, functional outcome measure of 59% limitation, and evolving clinical presentation. Findings today show lumbar extension bias with poor trunk stability, altered posture, and weakness of L>R LE impacting overall functional mobility including ability to twist, lifting, bend, sqat. Skilled PT indicated to treat at this time to address above stated deficits and return patient to PLOF. Impairments: abnormal gait, abnormal muscle firing, abnormal muscle tone, abnormal or restricted ROM, abnormal movement, activity intolerance, impaired balance, impaired physical strength, lacks appropriate home exercise program, pain with function, poor posture  and poor body mechanics  Functional limitations: lifting, pushing, pulling, bending, twisting, walking, stairs  Goals  STG (6 weeks)  1. Patient will have reported 0/10 pain in low back at rest.   2. Improve patient's lumbar extension to 15 degrees for increased ability to take proper strides during ambulation. 3. Increase patient's bilateral single leg balance to 10 seconds for increased stability on stairs. LTG (12 weeks)  1. Patient's LE strength will be equal bilaterally for ability to ambulate and return to functional activities at PLOF. 2. Patient will be able to return to work with 0/10 pain in low back. 3. Patient will be independent with home exercise program for continued maintenance post PT discharge. Plan  Plan details: Progress note in 4 weeks. Patient would benefit from: skilled physical therapy  Planned modality interventions: unattended electrical stimulation, thermotherapy: hydrocollator packs, cryotherapy and traction  Planned therapy interventions: manual therapy, neuromuscular re-education, self care, therapeutic activities, therapeutic exercise, home exercise program and gait training  Frequency: 2x week  Duration in weeks: 12  Plan of Care beginning date: 2023  Plan of Care expiration date: 2023  Treatment plan discussed with: patient and PTA        Subjective Evaluation    History of Present Illness  Date of onset: 2023  Mechanism of injury: Alexey Linda is a 28 y.o. male who presents to outpatient Physical Therapy today with complaints of low back pain. States he has been working in [place] for [employer] with some chronic back pain. 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 Was walking in [place] wrapping low and when came up higher felt a pop in back with pain causing him to go home early. Did have MRI which was (+) for bulging disc and nerve irritation. PCP referred to PT. Patient Goals  Patient goals for therapy: decreased pain  Patient goal: "I just want the pain to go away". Basketball, would like to get out of the house more. Pain  Current pain ratin  At best pain rating: 3  At worst pain rating: 10  Location: Left low back into LLE to foot  Quality: radiating  Relieving factors: medications  Exacerbated by: prolonged position. Social Support  Steps to enter house: yes  Stairs in house: no   Lives in: apartment    Employment status: working ([employer])    Diagnostic Tests  MRI studies: abnormal        Objective     Concurrent Complaints  Positive for saddle (S4) numbness.  Negative for bladder dysfunction, bowel dysfunction, history of trauma and infection    Palpation   Left   Hypertonic in the lumbar paraspinals and quadratus lumborum. Hypotonic in the erector spinae. Tenderness of the erector spinae, lumbar paraspinals and quadratus lumborum. Trigger point to quadratus lumborum. Right   Hypertonic in the erector spinae and lumbar paraspinals. Tenderness of the erector spinae and lumbar paraspinals. Neurological Testing     Sensation     Lumbar   Left   Diminished: light touch    Right   Intact: light touch    Comments   Left light touch: S1    Reflexes   Left   Babinski sign: negative    Right   Babinski sign: negative    Active Range of Motion     Lumbar   Flexion:  Restriction level: minimal  Extension:  Restriction level: moderate  Left lateral flexion:  Restriction level: minimal  Right lateral flexion:  Restriction level: moderate    Additional Active Range of Motion Details  Contralateral pull felt with SB    Joint Play     Hypermobile: L2 and L3     Hypomobile: T11, T12, L1, L4 and L5   Mechanical Assessment    Cervical      Thoracic      Lumbar    Standing flexion: repeated movements   Pain location:centralized  Pain intensity: worse  Standing extension: repeated movements  Pain location: no change  Pain intensity: worse    Strength/Myotome Testing     Left Hip   Planes of Motion   Flexion: 4+  Extension: 3+  Abduction: 4-    Right Hip   Planes of Motion   Flexion: 5  Extension: 4+  Abduction: 4    Left Knee   Flexion: 5  Extension: 5    Right Knee   Flexion: 5  Extension: 5    Left Ankle/Foot   Dorsiflexion: 4    Right Ankle/Foot   Dorsiflexion: 5    Muscle Activation     Additional Muscle Activation Details  Activation of TrA with resisted SLR R= min L=min    Tests     Lumbar     Left   Negative passive SLR. Right   Negative passive SLR.      Left Pelvic Girdle/Sacrum   Negative: active SLR test.     Right Pelvic Girdle/Sacrum   Negative: active SLR test.     Additional Tests Details  Hamstring 90/90 SLR R=(40) L=(30)  (-) supine to sit test  pull symptoms with manual lumbar traction      General Comments:      Lumbar Comments  Gait: forward trunk lean, lateral shift    FOTO: 41% function, 64% predicted function              Precautions: none noted  Access code:  [email or web address]  Progress note: 9/21  POC: 11/21    Manuals 8/21       PA lumbar/ low thoracic mobs Grade III-IV       PPU with OP        traction        QL stretch        Neuro Re-Ed        Core brace *       Knee fall out *       Supine march *       SLR with core brace        Bird dog        Prone glut set *       UBE *       Ther Ex                Standing lumbar extension 10x       PPU 5x 5x with exhale       Bridge *       Prone knee flex                        LTR        Quadruped LTR        Supine ball squeeze        Supine clamshell                QL stretch        Piriformis stretch        Hip flexor stretch        Hamstring stretch *       Ther Activity        Step ups        Sit to stand        Gait Training                        Modalities        Lumbar mechanical traction 111.6

## 2023-08-25 ENCOUNTER — OFFICE VISIT (OUTPATIENT)
Dept: PHYSICAL THERAPY | Facility: CLINIC | Age: 28
End: 2023-08-25
Payer: COMMERCIAL

## 2023-08-25 DIAGNOSIS — M54.16 LUMBAR RADICULOPATHY: Primary | ICD-10-CM

## 2023-08-25 PROCEDURE — 97110 THERAPEUTIC EXERCISES: CPT | Performed by: PHYSICAL THERAPIST

## 2023-08-25 PROCEDURE — 97112 NEUROMUSCULAR REEDUCATION: CPT | Performed by: PHYSICAL THERAPIST

## 2023-08-25 NOTE — PROGRESS NOTES
Daily Note     Today's date: 2023  Patient name: Italo Tomlin  : 1995  MRN: [id number]  Referring provider: Heather Denney DO  Dx:   Encounter Diagnosis     ICD-10-CM    1. Lumbar radiculopathy  M54.16                      Subjective: The patient notes feeling numbness / tingling / pain at his L LE> RLE rated a 6/10 presently. The patient notes he was at a 9/10 yesterday. Objective: See treatment diary below      Assessment: The patient tolerated all activities fair today. The patient needed verbal and manual cues for proper posture and technique to perform the exercises properly. The patient reports B/L hip muscle spasms periodically during the session. Difficulty noted with the bridges today. There were no complaints of increased pain or problems after the session today. The patient will benefit from continued skilled physical therapy to progress towards achieving patient centered goals. Plan: Continue per plan of care. Progress treatment as tolerated. Precautions: none noted  Access code:  [email or web address]  Progress note:   POC:     Manuals       PA lumbar/ low thoracic mobs Grade III-IV       PPU with OP        traction        QL stretch        Neuro Re-Ed        Posture over correction  x10      Core brace * x10      Knee fall out * x10      Supine march * x10      SLR with core brace        Bird dog        Prone glut set * x10      UBE * 90/70 x 4 mins      Ther Ex                Standing lumbar extension 10x 10x      PPU 5x 5x with exhale x10      Bridge * x10 p! hold     Prone knee flex                        LTR        Quadruped LTR        Supine ball squeeze        Supine clamshell                QL stretch        Piriformis stretch        Hip flexor stretch        Hamstring stretch *       Ther Activity        Step ups        Sit to stand        Gait Training                        Modalities        Lumbar mechanical traction

## 2023-08-28 ENCOUNTER — HOSPITAL ENCOUNTER (OUTPATIENT)
Dept: RADIOLOGY | Facility: CLINIC | Age: 28
Discharge: HOME | End: 2023-08-28
Payer: COMMERCIAL

## 2023-08-28 VITALS
TEMPERATURE: 98.8 F | DIASTOLIC BLOOD PRESSURE: 87 MMHG | OXYGEN SATURATION: 97 % | RESPIRATION RATE: 16 BRPM | SYSTOLIC BLOOD PRESSURE: 141 MMHG | HEART RATE: 76 BPM

## 2023-08-28 DIAGNOSIS — M48.061 LUMBAR FORAMINAL STENOSIS: ICD-10-CM

## 2023-08-28 DIAGNOSIS — M54.16 LUMBAR RADICULOPATHY: ICD-10-CM
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
## 2023-08-28 RX ORDER — METHYLPREDNISOLONE ACETATE 40 MG/ML
40 INJECTION, SUSPENSION INTRA-ARTICULAR; INTRALESIONAL; INTRAMUSCULAR; PARENTERAL; SOFT TISSUE ONCE
Status: COMPLETED | OUTPATIENT
Start: 2023-08-28 | End: 2023-08-28

## 2023-08-28 RX ORDER — 0.9 % SODIUM CHLORIDE 0.9 %
10 VIAL (ML) INJECTION ONCE
Status: COMPLETED | OUTPATIENT
Start: 2023-08-28 | End: 2023-08-28

## 2023-08-28 RX ORDER — BUPIVACAINE HCL/PF 2.5 MG/ML
10 VIAL (ML) INJECTION ONCE
Status: COMPLETED | OUTPATIENT
Start: 2023-08-28 | End: 2023-08-28

## 2023-08-28 RX ADMIN — Medication 2 ML: at 09:30

## 2023-08-28 RX ADMIN — SODIUM CHLORIDE 2 ML: 9 INJECTION, SOLUTION INTRAMUSCULAR; INTRAVENOUS; SUBCUTANEOUS at 09:30

## 2023-08-28 RX ADMIN — BUPIVACAINE HYDROCHLORIDE 2 ML: 2.5 INJECTION, SOLUTION EPIDURAL; INFILTRATION; INTRACAUDAL at 09:32

## 2023-08-28 RX ADMIN — METHYLPREDNISOLONE ACETATE 40 MG: 40 INJECTION, SUSPENSION INTRA-ARTICULAR; INTRALESIONAL; INTRAMUSCULAR; PARENTERAL; SOFT TISSUE at 09:32

## 2023-08-28 RX ADMIN — IOHEXOL 2 ML: 300 INJECTION, SOLUTION INTRAVENOUS at 09:32

## 2023-08-28 NOTE — DISCHARGE INSTR - LAB
Epidural Steroid Injection   WHAT YOU NEED TO KNOW:   An epidural steroid injection (CLARK) is a procedure to inject steroid medicine into the epidural space. The epidural space is between your spinal cord and vertebrae. Steroids reduce inflammation and fluid buildup in your spine that may be causing pain. You may be given pain medicine along with the steroids. ACTIVITY  Do not drive or operate machinery today. No strenuous activity today - bending, lifting, etc.  You may resume normal activites starting tomorrow - start slowly and as tolerated. You may shower today, but no tub baths or hot tubs. You may have numbness for several hours from the local anesthetic. Please use caution and common sense, especially with weight-bearing activities. CARE OF THE INJECTION SITE  If you have soreness or pain, apply ice to the area today (20 minutes on/20 minutes off). Starting tomorrow, you may use warm, moist heat or ice if needed. You may have an increase or change in your discomfort for 36-48 hours after your treatment. Apply ice and continue with any pain medication you have been prescribed. Notify the [facility] if you have any of the following: redness, drainage, swelling, headache, stiff neck or fever above 100°F.    SPECIAL INSTRUCTIONS  Our office will contact you in approximately 7 days for a progress report. MEDICATIONS  Continue to take all routine medications. Our office may have instructed you to hold some medications. As no general anesthesia was used in today's procedure, you should not experience any side effects related to anesthesia. If you are diabetic, the steroids used in today's injection may temporarily increase your blood sugar levels after the first few days after your injection. Please keep a close eye on your sugars and alert the doctor who manages your diabetes if your sugars are significantly high from your baseline or you are symptomatic.      If you have a problem specifically related to your procedure, please call our office at [phone]. Problems not related to your procedure should be directed to your primary care physician.

## 2023-08-28 NOTE — H&P
History of Present Illness: The patient is a 28 y.o. male who presents with complaints of low back pain    Past Medical History:   Diagnosis Date   • Allergic    • Asthma    • Stomach pain        Past Surgical History:   Procedure Laterality Date   • WRIST ARTHROTOMY Left          Current Outpatient Medications:   •  albuterol (ProAir HFA) 90 mcg/act inhaler, Inhale 2 puffs every 6 (six) hours as needed for wheezing, Disp: 8.5 g, Rfl: 0  •  gabapentin (NEURONTIN) 300 mg capsule, 300mg in am, 300mg in pm and 600mg at HS, Disp: 120 capsule, Rfl: 5  •  Ketorolac Tromethamine (TORADOL ORAL PO), Take 10 mg by mouth, Disp: , Rfl:   •  tiZANidine (Zanaflex) 4 mg tablet, Take 0.5 tablets (2 mg total) by mouth every 8 (eight) hours as needed for muscle spasms, Disp: 60 tablet, Rfl: 0    Current Facility-Administered Medications:   •  bupivacaine (PF) (MARCAINE) 0.25 % injection 10 mL, 10 mL, Epidural, Once, Raynald Em, DO  •  iohexol (OMNIPAQUE) 300 mg/mL injection 50 mL, 50 mL, Epidural, Once, Raynald Em, DO  •  lidocaine (PF) (XYLOCAINE-MPF) 2 % injection 5 mL, 5 mL, Infiltration, Once, Raynald Em, DO  •  methylPREDNISolone acetate (DEPO-MEDROL) injection 40 mg, 40 mg, Perineural, Once, Raynald Em, DO  •  sodium chloride (PF) 0.9 % injection 10 mL, 10 mL, Infiltration, Once, Raynald Em, DO    No Known Allergies    Physical Exam:   Vitals:    08/28/23 0909   BP: 133/83   Pulse: 75   Resp: 16   Temp: 98.8 °F (37.1 °C)   SpO2: 97%     General: Awake, Alert, Oriented x 3, Mood and affect appropriate  Respiratory: Respirations even and unlabored  Cardiovascular: Peripheral pulses intact; no edema  Musculoskeletal Exam: Tenderness palpation left-sided lumbar paraspinals    ASA Score: 2    Patient/Chart Verification  Patient ID Verified: Verbal  ID Band Applied: No  Consents Confirmed: Procedural, To be obtained in the Pre-Procedure area  H&P( within 30 days) Verified:  To be obtained in the Pre-Procedure area  Allergies Reviewed: Yes  Anticoag/NSAID held?: No  Currently on antibiotics?: No    Assessment:   1. Lumbar radiculopathy    2. Work related injury    3.  Lumbar foraminal stenosis        Plan: Left sided L4 TFESI

## 2023-08-29 ENCOUNTER — OFFICE VISIT (OUTPATIENT)
Dept: PHYSICAL THERAPY | Facility: CLINIC | Age: 28
End: 2023-08-29
Payer: COMMERCIAL

## 2023-08-29 DIAGNOSIS — M54.16 LUMBAR RADICULOPATHY: Primary | ICD-10-CM

## 2023-08-29 PROCEDURE — 97110 THERAPEUTIC EXERCISES: CPT

## 2023-08-29 PROCEDURE — 97112 NEUROMUSCULAR REEDUCATION: CPT

## 2023-08-29 NOTE — PROGRESS NOTES
Daily Note     Today's date: 2023  Patient name: Susan Carrel  : 1995  MRN: [id number]  Referring provider: Bryn Woodall DO  Dx:   Encounter Diagnosis     ICD-10-CM    1. Lumbar radiculopathy  M54.16           Start Time: 1302          Subjective: Patient had injections yesterday that helped a little. He is doing his exercises at home. Objective: See treatment diary below    Patient's home exercise program updated to include additional exercises. Handout issued and explained with demonstration. Patient accepted new exercises. Assessment: Tolerated treatment well. Patient would benefit from continued PT for stretching and strengthening. Patient was able to add exercises to his program with little difficulty and discomfort. He had a little leg tingling in left leg during hamstring stretches so they were held. He seemed to understand all education on new exercises. Patient felt ok by the end of the session. Plan: Continue per plan of care. Progress treatment as tolerated. Precautions: none noted  Access code:  [email or web address]  Progress note:   POC:     Manuals      PA lumbar/ low thoracic mobs Grade III-IV       PPU with OP        traction        QL stretch        Neuro Re-Ed        Posture over correction  x10 x10     Core brace * x10 x10     Knee fall out * x10 x10     Supine march * x10 x10     SLR with core brace   x10 B/L     Bird dog        Prone glut set * x10 x10     UBE * 90/70 x 4 mins 90/70 x4 min     Ther Ex                Standing lumbar extension 10x 10x x10     PPU 5x 5x with exhale x10 x10     Bridge * x10 p! hold     Prone knee flex   x10                     LTR        Quadruped LTR        Supine ball squeeze   :05x10     Supine clamshell   :05x10             QL stretch        Piriformis stretch        Hip flexor stretch        Hamstring stretch *  W/towel :20 x1L tingle     Ther Activity        Step ups        Sit to stand        Gait Training                        Modalities        Lumbar mechanical traction                  Access Code: [email or web address]  URL: [email or web address]  Date: 08/29/2023  Prepared by: Daniel Ferrer    Exercises  - Standing Lumbar Extension  - 6 x daily - 7 x weekly - 1 sets - 10 reps  - Prone Press Up  - 6 x daily - 7 x weekly - 1 sets - 10 reps - 10 sec hold  - Lying Prone  - 6 x daily - 7 x weekly - 1 sets - 10 reps  - Supine Transversus Abdominis Bracing - Hands on Stomach  - 2 x daily - 7 x weekly - 2 sets - 10 reps - 5 hold  - Supine Transversus Abdominis Bracing with Double Leg Fallout  - 2 x daily - 7 x weekly - 2 sets - 10 reps  - Supine March  - 2 x daily - 7 x weekly - 2 sets - 10 reps  - Seated Pelvic Floor Contraction  - 2 x daily - 7 x weekly - 1 sets - 10 reps - 10 hold  - Supine Bridge  - 2 x daily - 7 x weekly - 2 sets - 10 reps  - Clamshell  - 2 x daily - 7 x weekly - 2 sets - 10 reps - 5 hold  - Seated Upright Posture Correction  - 2-3 x daily - 7 x weekly - 1 sets - 10 reps - 5-10 hold  - Supine Straight Leg Raises  - 2 x daily - 7 x weekly - 1 sets - 10 reps  - Supine Hip Adduction Isometric with Ball  - 2 x daily - 7 x weekly - 1 sets - 10 reps - 5 sec hold  - Prone Knee Flexion  - 2 x daily - 7 x weekly - 1 sets - 10 reps - 5 sec hold  - Hooklying Clamshell with Resistance  - 2 x daily - 7 x weekly - 1 sets - 10 reps - 5 sec hold    Patient Education  - Office Posture

## 2023-08-31 ENCOUNTER — OFFICE VISIT (OUTPATIENT)
Dept: PHYSICAL THERAPY | Facility: CLINIC | Age: 28
End: 2023-08-31
Payer: COMMERCIAL

## 2023-08-31 DIAGNOSIS — M54.16 LUMBAR RADICULOPATHY: Primary | ICD-10-CM

## 2023-08-31 PROCEDURE — 97112 NEUROMUSCULAR REEDUCATION: CPT | Performed by: PHYSICAL THERAPIST

## 2023-08-31 PROCEDURE — 97110 THERAPEUTIC EXERCISES: CPT | Performed by: PHYSICAL THERAPIST

## 2023-08-31 NOTE — PROGRESS NOTES
Daily Note     Today's date: 2023  Patient name: Nimisha Beatty  : 1995  MRN: [id number]  Referring provider: Savage Galvez DO  Dx:   Encounter Diagnosis     ICD-10-CM    1. Lumbar radiculopathy  M54.16           Start Time: 1050  Stop Time: 1530  Total time in clinic (min): 45 minutes    Subjective: Patient states he is feeling better since injection. Pain has reduced to 3/10. Continues to have radicular pain into bilateral LE at decreased intensity. Attempted lifting box a work with proper mechanics, but still feeling "zing" in back down to toes. Objective: See treatment diary below      Assessment: Tolerated treatment well. Some increased N+T with nerve flossing exercises as expected, but did resolve when exercise was ended indicating continued neural tension. Showing good progress with core stability, able to increase intensity now that symptom severity has decreased. Patient would benefit from continued PT progressing as tolerated with core strength and lumbar mobility. Plan: Continue per plan of care. Progress treatment as tolerated. Precautions: none noted  Access code:  [email or web address]  Progress note:   POC:     Manuals     PA lumbar/ low thoracic mobs Grade III-IV       PPU with OP        traction        QL stretch        Neuro Re-Ed        Posture over correction  x10 x10 :10x10    Core brace * x10 x10 :05x10 HEP   Knee fall out * x10 x10 10x bilat    Supine march * x10 x10 Dead bug 10x    SLR with core brace   x10 B/L 10 bilat    Bird dog        Prone glut set * x10 x10     UBE * 90/70 x 4 mins 90/70 x4 min 90/70 x6 min    Sciatic flossing    10x supine 90/90 bilat    Ther Ex                Standing lumbar extension 10x 10x x10 // bars 10x    PPU 5x 5x with exhale x10 x10 5x, 5x with exhale    Bridge * x10 p! hold :05x10 with glut set    Prone knee flex   x10                     LTR        Quadruped LTR        Supine ball squeeze   :05x10 :05x10 Supine clamshell   :05x10 Side lying 10x            QL stretch        Piriformis stretch        Hip flexor stretch        Hamstring stretch *  W/towel :20 x1L tingle     Ther Activity        Step ups        Sit to stand        Gait Training                        Modalities        Lumbar mechanical traction                  Access Code: [email or web address]  URL: [email or web address]  Date: 08/29/2023  Prepared by: Ernst Ferrer    Exercises  - Standing Lumbar Extension  - 6 x daily - 7 x weekly - 1 sets - 10 reps  - Prone Press Up  - 6 x daily - 7 x weekly - 1 sets - 10 reps - 10 sec hold  - Lying Prone  - 6 x daily - 7 x weekly - 1 sets - 10 reps  - Supine Transversus Abdominis Bracing - Hands on Stomach  - 2 x daily - 7 x weekly - 2 sets - 10 reps - 5 hold  - Supine Transversus Abdominis Bracing with Double Leg Fallout  - 2 x daily - 7 x weekly - 2 sets - 10 reps  - Supine March  - 2 x daily - 7 x weekly - 2 sets - 10 reps  - Seated Pelvic Floor Contraction  - 2 x daily - 7 x weekly - 1 sets - 10 reps - 10 hold  - Supine Bridge  - 2 x daily - 7 x weekly - 2 sets - 10 reps  - Clamshell  - 2 x daily - 7 x weekly - 2 sets - 10 reps - 5 hold  - Seated Upright Posture Correction  - 2-3 x daily - 7 x weekly - 1 sets - 10 reps - 5-10 hold  - Supine Straight Leg Raises  - 2 x daily - 7 x weekly - 1 sets - 10 reps  - Supine Hip Adduction Isometric with Ball  - 2 x daily - 7 x weekly - 1 sets - 10 reps - 5 sec hold  - Prone Knee Flexion  - 2 x daily - 7 x weekly - 1 sets - 10 reps - 5 sec hold  - Hooklying Clamshell with Resistance  - 2 x daily - 7 x weekly - 1 sets - 10 reps - 5 sec hold    Patient Education  - Office Posture

## 2023-09-01 ENCOUNTER — APPOINTMENT (OUTPATIENT)
Dept: PHYSICAL THERAPY | Facility: CLINIC | Age: 28
End: 2023-09-01
Payer: COMMERCIAL

## 2023-09-05 ENCOUNTER — OFFICE VISIT (OUTPATIENT)
Dept: PHYSICAL THERAPY | Facility: CLINIC | Age: 28
End: 2023-09-05
Payer: COMMERCIAL

## 2023-09-05 ENCOUNTER — TELEPHONE (OUTPATIENT)
Dept: PAIN MEDICINE | Facility: CLINIC | Age: 28
End: 2023-09-05

## 2023-09-05 DIAGNOSIS — M54.16 LUMBAR RADICULOPATHY: Primary | ICD-10-CM

## 2023-09-05 PROCEDURE — 97110 THERAPEUTIC EXERCISES: CPT

## 2023-09-05 PROCEDURE — 97530 THERAPEUTIC ACTIVITIES: CPT

## 2023-09-05 PROCEDURE — 97112 NEUROMUSCULAR REEDUCATION: CPT

## 2023-09-05 NOTE — PROGRESS NOTES
Daily Note     Today's date: 2023  Patient name: Alexey Linda  : 1995  MRN: [id number]  Referring provider: Dasha Garcia DO  Dx:   Encounter Diagnosis     ICD-10-CM    1. Lumbar radiculopathy  M54.16           Start Time: 1300          Subjective: patient states he has no pain today. He woke up with a little that went away. He has his most pain at work. The back extensions have been helping. Objective: See treatment diary below      Assessment: Tolerated treatment well. Patient would benefit from continued PT for stretching and strengthening. Patient was able to add exercises to his program with little difficulty. He seemed to understand all education on new exercises. Weakness seen with some of his right LE exercises. He felt a difference between the strength ing both legs. Some leg tingle and numbness felt at end of bridging that was dissipated with prone press ups. Patient felt better after his back extension exercises. He had no pain leaving department. Plan: Continue per plan of care. Progress treatment as tolerated. Precautions: none noted  Access code:  [email or web address]  Progress note:   POC:     Manuals     PA lumbar/ low thoracic mobs        PPU with OP        traction        QL stretch        Neuro Re-Ed        Posture over correction  x10 x10 :10x10 :10x10   Core brace  x10 x10 :05x10 HEP   Knee fall out  x10 x10 10x bilat x10 B/L   Supine march  x10 x10 Dead bug 10x x10   SLR with core brace   x10 B/L 10 bilat Flex x10  Ext x10   Bird dog     *UE x10  LE x10   Prone glut set  x10 x10     UBE  90/70 x 4 mins 90/70 x4 min 90/70 x6 min 90/70 x6 min   Sciatic flossing    10x supine 90/90 bilat 10x supine 90/90 bilat   Ther Ex                Standing lumbar extension  10x x10 // bars 10x // bars 10x   PPU  x10 x10 5x, 5x with exhale x10   Bridge  x10 p! hold :05x10 with glut set :05x10 with glut set tingle   Prone knee flex   x10     Side step     *// bars 10ft x4   Heel to toe     *// bars 10 ft x4   LTR     x10   Quadruped LTR        Supine ball squeeze   :05x10 :05x10 :05x10   Supine clamshell   :05x10 Side lying 10x Side lying x10           QL stretch        Piriformis stretch        Hip flexor stretch        Hamstring stretch   W/towel :20 x1L tingle     Ther Activity        Step ups fwd/lat     6" x10 ea   Sit to stand        Gait Training                        Modalities        Lumbar mechanical traction                  Access Code: [email or web address]  URL: [email or web address]  Date: 09/05/2023  Prepared by: Kassy Ferrer    Exercises  - Standing Lumbar Extension  - 6 x daily - 7 x weekly - 1 sets - 10 reps  - Prone Press Up  - 6 x daily - 7 x weekly - 1 sets - 10 reps - 10 sec hold  - Lying Prone  - 6 x daily - 7 x weekly - 1 sets - 10 reps  - Supine Transversus Abdominis Bracing - Hands on Stomach  - 2 x daily - 7 x weekly - 2 sets - 10 reps - 5 hold  - Supine Transversus Abdominis Bracing with Double Leg Fallout  - 2 x daily - 7 x weekly - 2 sets - 10 reps  - Supine March  - 2 x daily - 7 x weekly - 2 sets - 10 reps  - Seated Pelvic Floor Contraction  - 2 x daily - 7 x weekly - 1 sets - 10 reps - 10 hold  - Supine Bridge  - 2 x daily - 7 x weekly - 2 sets - 10 reps  - Clamshell  - 2 x daily - 7 x weekly - 2 sets - 10 reps - 5 hold  - Seated Upright Posture Correction  - 2-3 x daily - 7 x weekly - 1 sets - 10 reps - 5-10 hold  - Supine Straight Leg Raises  - 2 x daily - 7 x weekly - 1 sets - 10 reps  - Supine Hip Adduction Isometric with Ball  - 2 x daily - 7 x weekly - 1 sets - 10 reps - 5 sec hold  - Prone Knee Flexion  - 2 x daily - 7 x weekly - 1 sets - 10 reps - 5 sec hold  - Hooklying Clamshell with Resistance  - 2 x daily - 7 x weekly - 1 sets - 10 reps - 5 sec hold  - Supine Lower Trunk Rotation  - 2 x daily - 7 x weekly - 1 sets - 10 reps  - Prone Hip Extension  - 2 x daily - 7 x weekly - 1 sets - 10 reps  - Quadruped Alternating Arm Lift  - 2 x daily - 7 x weekly - 1 sets - 10 reps  - Quadruped Alternating Leg Extensions  - 2 x daily - 7 x weekly - 1 sets - 10 reps  - Step Up  - 2 x daily - 7 x weekly - 1 sets - 10 reps  - Lateral Step Up  - 2 x daily - 7 x weekly - 1 sets - 10 reps  - Tandem Walking with Counter Support  - 2 x daily - 7 x weekly - 1 sets - 10 reps  - Side Stepping with Counter Support  - 2 x daily - 7 x weekly - 1 sets - 10 reps    Patient Education  - Office Posture

## 2023-09-06 ENCOUNTER — OFFICE VISIT (OUTPATIENT)
Dept: FAMILY MEDICINE CLINIC | Facility: CLINIC | Age: 28
End: 2023-09-06
Payer: COMMERCIAL

## 2023-09-06 VITALS
HEART RATE: 86 BPM | WEIGHT: 209 LBS | BODY MASS INDEX: 32.8 KG/M2 | TEMPERATURE: 97.3 F | SYSTOLIC BLOOD PRESSURE: 112 MMHG | RESPIRATION RATE: 16 BRPM | HEIGHT: 67 IN | OXYGEN SATURATION: 97 % | DIASTOLIC BLOOD PRESSURE: 80 MMHG

## 2023-09-06 DIAGNOSIS — M54.16 LUMBAR RADICULOPATHY: Primary | ICD-10-CM

## 2023-09-06 DIAGNOSIS — Z13.1 SCREENING FOR DIABETES MELLITUS (DM): ICD-10-CM
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
## 2023-09-06 DIAGNOSIS — Z13.6 SCREENING FOR CARDIOVASCULAR, RESPIRATORY, AND GENITOURINARY DISEASES: ICD-10-CM

## 2023-09-06 DIAGNOSIS — Z13.83 SCREENING FOR CARDIOVASCULAR, RESPIRATORY, AND GENITOURINARY DISEASES: ICD-10-CM

## 2023-09-06 DIAGNOSIS — Z13.89 SCREENING FOR CARDIOVASCULAR, RESPIRATORY, AND GENITOURINARY DISEASES: ICD-10-CM

## 2023-09-06 PROCEDURE — 99214 OFFICE O/P EST MOD 30 MIN: CPT | Performed by: FAMILY MEDICINE

## 2023-09-06 RX ORDER — GABAPENTIN 300 MG/1
CAPSULE ORAL
Qty: 120 CAPSULE | Refills: 5
Start: 2023-09-06

## 2023-09-06 NOTE — PROGRESS NOTES
Assessment/Plan:    No problem-specific Assessment & Plan notes found for this encounter. Work related injury  He is still pursuing the case with an     Lumbar radiculopathy   Improving  Left EHL nearly 5/5  Still has pain with gabapentin 300mg bid but nausea with 300mg tid  Try 300mg am and 600mg pm if tolerated    Episode of chest pain  Update screening labs  ER if recurs    Please try taking gabapentin 300mg in am and 600mg at bedtime instead of 300mg twice to see if it works better for pain without any extra nausea. Diagnoses and all orders for this visit:    Lumbar radiculopathy  -     gabapentin (NEURONTIN) 300 mg capsule; 300mg in am and 600mg at HS    Work related injury  -     gabapentin (NEURONTIN) 300 mg capsule; 300mg in am and 600mg at HS    Screening for cardiovascular, respiratory, and genitourinary diseases  -     Lipid Panel with Direct LDL reflex; Future    Screening for diabetes mellitus (DM)  -     Comprehensive metabolic panel; Future          Return in about 6 months (around 3/6/2024) for Recheck. Subjective:      Patient ID: Joselin Gomez is a 33 y.o. male.     Chief Complaint   Patient presents with   • Follow-up     Bridget Mcelroy CMA        HPI  Still trying to get paid for missed work due to [facility]  Leg/back ok, some flare ups, going to PT, 2x/w, few weeks now  Still gets spasms, less often but still intense  About 2x/d  Has CLARK x 1 thus far, about 2w ago  Some improvement noted    On gabapentin 300 bid currently  Less nausea on bid than 300mg tid    Chest aches  Left side  About 2 weeks  Daily  On/off  No trigger, can happen at rest  Some sob but no fainting, some radiation to left shoulder    Rare tizanidine, uses prn  Spasms last 10-60 seconds when they occur    The following portions of the patient's history were reviewed and updated as appropriate: allergies, current medications, past family history, past medical history, past social history, past surgical history and problem list.    Review of Systems   Genitourinary: Negative for difficulty urinating. Musculoskeletal: Positive for back pain and myalgias. Current Outpatient Medications   Medication Sig Dispense Refill   • albuterol (ProAir HFA) 90 mcg/act inhaler Inhale 2 puffs every 6 (six) hours as needed for wheezing 8.5 g 0   • gabapentin (NEURONTIN) 300 mg capsule 300mg in am and 600mg at  capsule 5   • tiZANidine (Zanaflex) 4 mg tablet Take 0.5 tablets (2 mg total) by mouth every 8 (eight) hours as needed for muscle spasms 60 tablet 0   • Ketorolac Tromethamine (TORADOL ORAL PO) Take 10 mg by mouth (Patient not taking: Reported on 9/6/2023)       No current facility-administered medications for this visit. Objective:    /80   Pulse 86   Temp (!) 97.3 °F (36.3 °C)   Resp 16   Ht 5' 7" (1.702 m)   Wt 94.8 kg (209 lb)   SpO2 97%   BMI 32.73 kg/m²        Physical Exam  Vitals and nursing note reviewed. Constitutional:       General: He is not in acute distress. Appearance: He is well-developed. He is not ill-appearing. HENT:      Head: Normocephalic. Right Ear: Tympanic membrane normal.      Left Ear: Tympanic membrane normal.   Eyes:      General: No scleral icterus. Conjunctiva/sclera: Conjunctivae normal.   Cardiovascular:      Rate and Rhythm: Normal rate and regular rhythm. Pulmonary:      Effort: Pulmonary effort is normal. No respiratory distress. Breath sounds: No wheezing. Abdominal:      Palpations: Abdomen is soft. Tenderness: There is no abdominal tenderness. Musculoskeletal:         General: No deformity. Cervical back: Neck supple. Right lower leg: No edema. Left lower leg: No edema. Comments: SLR neg b/l     Skin:     General: Skin is warm and dry. Neurological:      Mental Status: He is alert. Motor: No weakness.       Gait: Gait normal.      Comments: Left EHL nearly 5/5 strength   Psychiatric:         Mood and Affect: Mood normal.         Behavior: Behavior normal.         Thought Content:  Thought content normal.                Amol Mays, DO

## 2023-09-06 NOTE — PATIENT INSTRUCTIONS
Please try taking gabapentin 300mg in am and 600mg at bedtime instead of 300mg twice to see if it works better for pain without any extra nausea.

## 2023-09-07 ENCOUNTER — OFFICE VISIT (OUTPATIENT)
Dept: PHYSICAL THERAPY | Facility: CLINIC | Age: 28
End: 2023-09-07
Payer: COMMERCIAL

## 2023-09-07 DIAGNOSIS — M54.16 LUMBAR RADICULOPATHY: Primary | ICD-10-CM

## 2023-09-07 PROCEDURE — 97110 THERAPEUTIC EXERCISES: CPT

## 2023-09-07 PROCEDURE — 97112 NEUROMUSCULAR REEDUCATION: CPT

## 2023-09-07 NOTE — PROGRESS NOTES
Daily Note     Today's date: 2023  Patient name: Arelis Ni  : 1995  MRN: [id number]  Referring provider: Fritz Eli DO  Dx:   Encounter Diagnosis     ICD-10-CM    1. Lumbar radiculopathy  M54.16           Start Time: 3637          Subjective: Patient states yesterday he had a lot of back muscle spasms that bought him almost to his knees a couple of times (8/10 pain). Presently he has 7/10 pain in mid to low back off to left side. Objective: See treatment diary below    Patient's home exercise program updated to include additional exercises. Handout issued and explained with demonstration. Patient accepts new exercises. Assessment: Tolerated treatment well. Patient would benefit from continued PT for stretching and strengthening. Patient was able to add thoracic stretching exercises during session. He seemed to understand all education on new exercises. Patient felt better and rated his pain a 4/10 when he finished treatment. Plan: Continue per plan of care. Progress treatment as tolerated. Precautions: none noted  Access code: [email or web address]  Progress note:   POC:     Manuals    PA lumbar/ low thoracic mobs        PPU with OP        traction        QL stretch        Neuro Re-Ed        Posture over correction :10x10 x10 x10 :10x10 :10x10   Core brace  x10 x10 :05x10 HEP   Knee fall out x10 B/L x10 x10 10x bilat x10 B/L   Supine march x10 x10 x10 Dead bug 10x x10   SLR with core brace 2x5 B/L  Ext x10  x10 B/L 10 bilat Flex x10  Ext x10   Bird dog UE x10  LE x10    *UE x10  LE x10   Prone glut set  x10 x10     UBE 90/70 x6 min 90/70 x 4 mins 90/70 x4 min 90/70 x6 min 90/70 x6 min   Sciatic flossing 10x supine 90/90 bilat   10x supine 90/90 bilat 10x supine 90/90 bilat   Ther Ex                Thoracic ext *x10       Standing lumbar extension x10  OP x10 10x x10 // bars 10x // bars 10x   PPU x10 x10 x10 5x, 5x with exhale x10   Bridge hold x10 p! hold :05x10 with glut set :05x10 with glut set tingle   Prone knee flex   x10     Side step nv    *// bars 10ft x4   Heel to toe nv    *// bars 10 ft x4   LTR x10    x10   Quadruped LTR        Supine ball squeeze :05x10  :05x10 :05x10 :05x10   Supine clamshell Side lying x10 B/L  :05x10 Side lying 10x Side lying x10   Open books *x10 ea       QL stretch        Piriformis stretch        Hip flexor stretch        Hamstring stretch   W/towel :20 x1L tingle     Ther Activity        Step ups fwd/lat     6" x10 ea   Sit to stand        Gait Training                        Modalities        Lumbar mechanical traction                  Access Code: [email or web address]  URL: [email or web address]  Date: 09/07/2023  Prepared by: Laura Ferrer    Exercises  - Standing Lumbar Extension  - 6 x daily - 7 x weekly - 1 sets - 10 reps  - Prone Press Up  - 6 x daily - 7 x weekly - 1 sets - 10 reps - 10 sec hold  - Lying Prone  - 6 x daily - 7 x weekly - 1 sets - 10 reps  - Supine Transversus Abdominis Bracing - Hands on Stomach  - 2 x daily - 7 x weekly - 2 sets - 10 reps - 5 hold  - Supine Transversus Abdominis Bracing with Double Leg Fallout  - 2 x daily - 7 x weekly - 2 sets - 10 reps  - Supine March  - 2 x daily - 7 x weekly - 2 sets - 10 reps  - Seated Pelvic Floor Contraction  - 2 x daily - 7 x weekly - 1 sets - 10 reps - 10 hold  - Supine Bridge  - 2 x daily - 7 x weekly - 2 sets - 10 reps  - Clamshell  - 2 x daily - 7 x weekly - 2 sets - 10 reps - 5 hold  - Seated Upright Posture Correction  - 2-3 x daily - 7 x weekly - 1 sets - 10 reps - 5-10 hold  - Supine Straight Leg Raises  - 2 x daily - 7 x weekly - 1 sets - 10 reps  - Supine Hip Adduction Isometric with Ball  - 2 x daily - 7 x weekly - 1 sets - 10 reps - 5 sec hold  - Prone Knee Flexion  - 2 x daily - 7 x weekly - 1 sets - 10 reps - 5 sec hold  - Hooklying Clamshell with Resistance  - 2 x daily - 7 x weekly - 1 sets - 10 reps - 5 sec hold  - Supine Lower Trunk Rotation  - 2 x daily - 7 x weekly - 1 sets - 10 reps  - Prone Hip Extension  - 2 x daily - 7 x weekly - 1 sets - 10 reps  - Quadruped Alternating Arm Lift  - 2 x daily - 7 x weekly - 1 sets - 10 reps  - Quadruped Alternating Leg Extensions  - 2 x daily - 7 x weekly - 1 sets - 10 reps  - Step Up  - 2 x daily - 7 x weekly - 1 sets - 10 reps  - Lateral Step Up  - 2 x daily - 7 x weekly - 1 sets - 10 reps  - Tandem Walking with Counter Support  - 2 x daily - 7 x weekly - 1 sets - 10 reps  - Side Stepping with Counter Support  - 2 x daily - 7 x weekly - 1 sets - 10 reps  - Seated Thoracic Lumbar Extension  - 2 x daily - 7 x weekly - 1 sets - 10 reps  - Sidelying Open Book Thoracic Lumbar Rotation and Extension  - 2 x daily - 7 x weekly - 1 sets - 10 reps    Patient Education  - Office Posture

## 2023-09-07 NOTE — TELEPHONE ENCOUNTER
Caller: Dannie Cabot PT    Doctor: Dr Saurav Daniel     Reason for call: Pt called back with 20 % improvement and pain level 6 /10.       Call back#: [phone]

## 2023-09-08 ENCOUNTER — APPOINTMENT (OUTPATIENT)
Dept: PHYSICAL THERAPY | Facility: CLINIC | Age: 28
End: 2023-09-08
Payer: COMMERCIAL

## 2023-09-10 NOTE — PROGRESS NOTES
Daily Note     Today's date: 2023  Patient name: Leisa Griffith  : 1995  MRN: [id number]  Referring provider: Zonia Holley DO  Dx:   Encounter Diagnosis     ICD-10-CM    1. Lumbar radiculopathy  M54.16                      Subjective: The patient states that he is having a good day today. Notes that he is having a little flare up but not having as much pain as normal.  Last Thursday at work he had increased pain up to 10/10 at times and he had a hard time putting pressure through his L leg when walking. Rested over the weekend which helped to decrease the pain. Objective: See treatment diary below      Assessment: Tolerated treatment fair. Some verbal cues needed for correct form with completing TE. Weakness is noted in BLE with TE, LLE > RLE, especially with supine SLR and prone hip extension. Reported decreased pain at end of session. Patient demonstrated fatigue post treatment and would benefit from continued PT. Plan: Continue per plan of care. Precautions: none noted  Access code:  [email or web address]  Progress note:   POC:     Manuals    PA lumbar/ low thoracic mobs        PPU with OP        traction        QL stretch        Neuro Re-Ed        Posture over correction :10x10 10" x10 x10 :10x10 :10x10   Core brace   x10 :05x10 HEP   Knee fall out x10 B/L x10 Odin x10 10x bilat x10 B/L   Supine march x10 x10 x10 Dead bug 10x x10   SLR with core brace 2x5 B/L  Ext x10 2x5 Odin  Ext 10x x10 B/L 10 bilat Flex x10  Ext x10   Bird dog UE x10  LE x10 UE 10x  LE 10x   *UE x10  LE x10   Prone glut set   x10     UBE 90/70 x6 min 90/70 x 6 mins 90/70 x4 min 90/70 x6 min 90/70 x6 min   Sciatic flossing 10x supine 90/90 bilat 10x supine 90/90 Odin  10x supine 90/90 bilat 10x supine 90/90 bilat   Ther Ex                Thoracic ext *x10 10x      Standing lumbar extension x10  OP x10 10x  OP 10x x10 // bars 10x // bars 10x   PPU x10 x10 x10 5x, 5x with exhale x10 Bridge hold  hold :05x10 with glut set :05x10 with glut set tingle   Prone knee flex   x10     Side step nv    *// bars 10ft x4   Heel to toe nv    *// bars 10 ft x4   LTR x10 10x   x10   Quadruped LTR        Supine ball squeeze :05x10 5"x10 :05x10 :05x10 :05x10   Supine clamshell Side lying x10 B/L S/L  10x Odin :05x10 Side lying 10x Side lying x10   Open books *x10 ea 10x ea      QL stretch        Piriformis stretch        Hip flexor stretch        Hamstring stretch   W/towel :20 x1L tingle     Ther Activity        Step ups fwd/lat     6" x10 ea   Sit to stand        Gait Training                        Modalities        Lumbar mechanical traction                  Access Code: [email or web address]  URL: [email or web address]  Date: 09/07/2023  Prepared by: Flaquita Ferrer    Exercises  - Standing Lumbar Extension  - 6 x daily - 7 x weekly - 1 sets - 10 reps  - Prone Press Up  - 6 x daily - 7 x weekly - 1 sets - 10 reps - 10 sec hold  - Lying Prone  - 6 x daily - 7 x weekly - 1 sets - 10 reps  - Supine Transversus Abdominis Bracing - Hands on Stomach  - 2 x daily - 7 x weekly - 2 sets - 10 reps - 5 hold  - Supine Transversus Abdominis Bracing with Double Leg Fallout  - 2 x daily - 7 x weekly - 2 sets - 10 reps  - Supine March  - 2 x daily - 7 x weekly - 2 sets - 10 reps  - Seated Pelvic Floor Contraction  - 2 x daily - 7 x weekly - 1 sets - 10 reps - 10 hold  - Supine Bridge  - 2 x daily - 7 x weekly - 2 sets - 10 reps  - Clamshell  - 2 x daily - 7 x weekly - 2 sets - 10 reps - 5 hold  - Seated Upright Posture Correction  - 2-3 x daily - 7 x weekly - 1 sets - 10 reps - 5-10 hold  - Supine Straight Leg Raises  - 2 x daily - 7 x weekly - 1 sets - 10 reps  - Supine Hip Adduction Isometric with Ball  - 2 x daily - 7 x weekly - 1 sets - 10 reps - 5 sec hold  - Prone Knee Flexion  - 2 x daily - 7 x weekly - 1 sets - 10 reps - 5 sec hold  - Hooklying Clamshell with Resistance  - 2 x daily - 7 x weekly - 1 sets - 10 reps - 5 sec hold  - Supine Lower Trunk Rotation  - 2 x daily - 7 x weekly - 1 sets - 10 reps  - Prone Hip Extension  - 2 x daily - 7 x weekly - 1 sets - 10 reps  - Quadruped Alternating Arm Lift  - 2 x daily - 7 x weekly - 1 sets - 10 reps  - Quadruped Alternating Leg Extensions  - 2 x daily - 7 x weekly - 1 sets - 10 reps  - Step Up  - 2 x daily - 7 x weekly - 1 sets - 10 reps  - Lateral Step Up  - 2 x daily - 7 x weekly - 1 sets - 10 reps  - Tandem Walking with Counter Support  - 2 x daily - 7 x weekly - 1 sets - 10 reps  - Side Stepping with Counter Support  - 2 x daily - 7 x weekly - 1 sets - 10 reps  - Seated Thoracic Lumbar Extension  - 2 x daily - 7 x weekly - 1 sets - 10 reps  - Sidelying Open Book Thoracic Lumbar Rotation and Extension  - 2 x daily - 7 x weekly - 1 sets - 10 reps    Patient Education  - Office Posture

## 2023-09-11 ENCOUNTER — OFFICE VISIT (OUTPATIENT)
Dept: PHYSICAL THERAPY | Facility: CLINIC | Age: 28
End: 2023-09-11
Payer: COMMERCIAL

## 2023-09-11 DIAGNOSIS — M54.16 LUMBAR RADICULOPATHY: Primary | ICD-10-CM

## 2023-09-11 PROCEDURE — 97110 THERAPEUTIC EXERCISES: CPT | Performed by: PHYSICAL THERAPIST

## 2023-09-11 PROCEDURE — 97112 NEUROMUSCULAR REEDUCATION: CPT | Performed by: PHYSICAL THERAPIST

## 2023-09-14 ENCOUNTER — OFFICE VISIT (OUTPATIENT)
Dept: PHYSICAL THERAPY | Facility: CLINIC | Age: 28
End: 2023-09-14
Payer: COMMERCIAL

## 2023-09-14 DIAGNOSIS — M54.16 LUMBAR RADICULOPATHY: Primary | ICD-10-CM

## 2023-09-14 PROCEDURE — 97112 NEUROMUSCULAR REEDUCATION: CPT

## 2023-09-14 PROCEDURE — 97110 THERAPEUTIC EXERCISES: CPT

## 2023-09-14 NOTE — PROGRESS NOTES
Daily Note     Today's date: 2023  Patient name: Bro Forbes  : 1995  MRN: [id number]  Referring provider: Lavell Way DO  Dx:   Encounter Diagnosis     ICD-10-CM    1. Lumbar radiculopathy  M54.16           Start Time: 1319          Subjective: Patient states presently he has no pain. He had only a few twinges at work, "but not bad". Overall he feels he is improving. Objective: See treatment diary below      Assessment: Tolerated treatment well. Patient would benefit from continued PT for stretching and strengthening. Patient performed his exercises with few verbal cues for proper performance of exercises. Some new exercises were added to his program. He had a popping self release during standing back extensions that did not cause pain, but a relief. He felt overall the exercises went easier then they had been. Patient felt looser by the end of the session. Plan: Continue per plan of care. Progress treatment as tolerated. Precautions: none noted  Access code: [email or web address]  Progress note:   POC:     Manuals    PA lumbar/ low thoracic mobs        PPU with OP        traction        QL stretch        Neuro Re-Ed        Posture over correction :10x10 10" x10 :10x10  :10x10   Core brace     HEP   Knee fall out x10 B/L x10 Odin X10 B/L  x10 B/L   Supine march x10 x10 x10  x10   SLR with core brace 2x5 B/L  Ext x10 2x5 Odin  Ext 10x Flex x10 B/L  Ext x10 B/L  Flex x10  Ext x10   Bird dog UE x10  LE x10 UE 10x  LE 10x UE x10  LEx10  UE+LE x10  *UE x10  LE x10   Prone glut set        UBE 90/70 x6 min 90/70 x 6 mins 90/70 x6 min  90/70 x6 min   Sciatic flossing 10x supine 90/90 bilat 10x supine 90/90 Odin 10x supine 90/90 Odin  10x supine 90/90 bilat   Ther Ex                Thoracic ext *x10 10x x10     Standing lumbar extension x10  OP x10 10x  OP 10x 10x    // bars 10x   PPU x10 x10 x10  x10   Bridge hold   ?  test :05x10 with glut set tingle   Prone knee flex Side step nv  NV  *// bars 10ft x4   Heel to toe nv  NV  *// bars 10 ft x4   LTR x10 10x x15  x10   Quadruped LTR        Supine ball squeeze :05x10 5"x10 :05x15  :05x10   Supine clamshell Side lying x10 B/L S/L  10x Odin S/L x15 B/L  Side lying x10   Open books *x10 ea 10x ea x15 ea     QL stretch        Piriformis stretch        Hip flexor stretch        Hamstring stretch        Ther Activity        Step ups fwd/lat   NV  6" x10 ea   Sit to stand        Gait Training                        Modalities        Lumbar mechanical traction                  Access Code: [email or web address]  URL: [email or web address]  Date: 09/07/2023  Prepared by: Shruthi Ferrer    Exercises  - Standing Lumbar Extension  - 6 x daily - 7 x weekly - 1 sets - 10 reps  - Prone Press Up  - 6 x daily - 7 x weekly - 1 sets - 10 reps - 10 sec hold  - Lying Prone  - 6 x daily - 7 x weekly - 1 sets - 10 reps  - Supine Transversus Abdominis Bracing - Hands on Stomach  - 2 x daily - 7 x weekly - 2 sets - 10 reps - 5 hold  - Supine Transversus Abdominis Bracing with Double Leg Fallout  - 2 x daily - 7 x weekly - 2 sets - 10 reps  - Supine March  - 2 x daily - 7 x weekly - 2 sets - 10 reps  - Seated Pelvic Floor Contraction  - 2 x daily - 7 x weekly - 1 sets - 10 reps - 10 hold  - Supine Bridge  - 2 x daily - 7 x weekly - 2 sets - 10 reps  - Clamshell  - 2 x daily - 7 x weekly - 2 sets - 10 reps - 5 hold  - Seated Upright Posture Correction  - 2-3 x daily - 7 x weekly - 1 sets - 10 reps - 5-10 hold  - Supine Straight Leg Raises  - 2 x daily - 7 x weekly - 1 sets - 10 reps  - Supine Hip Adduction Isometric with Ball  - 2 x daily - 7 x weekly - 1 sets - 10 reps - 5 sec hold  - Prone Knee Flexion  - 2 x daily - 7 x weekly - 1 sets - 10 reps - 5 sec hold  - Hooklying Clamshell with Resistance  - 2 x daily - 7 x weekly - 1 sets - 10 reps - 5 sec hold  - Supine Lower Trunk Rotation  - 2 x daily - 7 x weekly - 1 sets - 10 reps  - Prone Hip Extension - 2 x daily - 7 x weekly - 1 sets - 10 reps  - Quadruped Alternating Arm Lift  - 2 x daily - 7 x weekly - 1 sets - 10 reps  - Quadruped Alternating Leg Extensions  - 2 x daily - 7 x weekly - 1 sets - 10 reps  - Step Up  - 2 x daily - 7 x weekly - 1 sets - 10 reps  - Lateral Step Up  - 2 x daily - 7 x weekly - 1 sets - 10 reps  - Tandem Walking with Counter Support  - 2 x daily - 7 x weekly - 1 sets - 10 reps  - Side Stepping with Counter Support  - 2 x daily - 7 x weekly - 1 sets - 10 reps  - Seated Thoracic Lumbar Extension  - 2 x daily - 7 x weekly - 1 sets - 10 reps  - Sidelying Open Book Thoracic Lumbar Rotation and Extension  - 2 x daily - 7 x weekly - 1 sets - 10 reps    Patient Education  - Office Posture

## 2023-09-15 ENCOUNTER — APPOINTMENT (OUTPATIENT)
Dept: PHYSICAL THERAPY | Facility: CLINIC | Age: 28
End: 2023-09-15
Payer: COMMERCIAL

## 2023-09-18 ENCOUNTER — OFFICE VISIT (OUTPATIENT)
Dept: PHYSICAL THERAPY | Facility: CLINIC | Age: 28
End: 2023-09-18
Payer: COMMERCIAL

## 2023-09-18 DIAGNOSIS — M54.16 LUMBAR RADICULOPATHY: Primary | ICD-10-CM

## 2023-09-18 PROCEDURE — 97112 NEUROMUSCULAR REEDUCATION: CPT | Performed by: PHYSICAL THERAPIST

## 2023-09-18 PROCEDURE — 97110 THERAPEUTIC EXERCISES: CPT | Performed by: PHYSICAL THERAPIST

## 2023-09-18 NOTE — PROGRESS NOTES
Daily Note     Today's date: 2023  Patient name: Ryley Hernandez  : 1995  MRN: [id number]  Referring provider: Stacy Butler DO  Dx:   Encounter Diagnosis     ICD-10-CM    1. Lumbar radiculopathy  M54.16           Start Time: 90  Stop Time: 1400  Total time in clinic (min): 45 minutes    Subjective: States he is feeling good today. Objective: See treatment diary below      Assessment: Tolerated treatment well. Able to include bridging exercise today without peripheralizing symptoms. Good progress with core strengthening and decreased radicular symptoms throughout session. Able to increase to standing stability exercises gradually. Patient would benefit from continued PT      Plan: Continue per plan of care. Progress treatment as tolerated. Precautions: none noted  Access code:  [email or web address]  Progress note:   POC:     Manuals    PA lumbar/ low thoracic mobs        PPU with OP        traction        QL stretch        Neuro Re-Ed        Posture over correction :10x10 10" x10 :10x10 :10x10 :10x10   Core brace     HEP   Knee fall out x10 B/L x10 Odin X10 B/L  x10 B/L   Supine march x10 x10 x10 10x x10   SLR with core brace 2x5 B/L  Ext x10 2x5 Odin  Ext 10x Flex x10 B/L  Ext x10 B/L Flex x10 B/L  Ext x10 B/L Flex x10  Ext x10   Bird dog UE x10  LE x10 UE 10x  LE 10x UE x10  LEx10  UE+LE x10 LEx10  UE+LE x10 *UE x10  LE x10   Prone glut set        UBE 90/70 x6 min 90/70 x 6 mins 90/70 x6 min 90/70 x6 min alt 90/70 x6 min   Sciatic flossing 10x supine 90/90 bilat 10x supine 90/90 Odni 10x supine 90/90 Odin 10x supine 90/90 Odin 10x supine 90/90 bilat       Anti rotation    Ther Ex                Thoracic ext *x10 10x x10 10x with towel roll    Standing lumbar extension x10  OP x10 10x  OP 10x 10x   // bars 10x // bars 10x   PPU x10 x10 x10 10x x10   Bridge hold   10x :05x10 with glut set tingle   Prone knee flex        Side step nv  NV  *// bars 10ft x4   Heel to toe nv NV  *// bars 10 ft x4   LTR x10 10x x15 15x x10   Quadruped LTR        Supine ball squeeze :05x10 5"x10 :05x15  :05x10   Supine clamshell Side lying x10 B/L S/L  10x Odin S/L x15 B/L S/L x15 B/L Side lying x10   Open books *x10 ea 10x ea x15 ea x15 ea    QL stretch        Piriformis stretch        Hip flexor stretch        Hamstring stretch        Ther Activity        Step ups fwd/lat   NV 6" 10x ea 6" x10 ea   Sit to stand        Gait Training                        Modalities        Lumbar mechanical traction                  Access Code: [email or web address]  URL: [email or web address]  Date: 09/07/2023  Prepared by: Andrés Ferrer    Exercises  - Standing Lumbar Extension  - 6 x daily - 7 x weekly - 1 sets - 10 reps  - Prone Press Up  - 6 x daily - 7 x weekly - 1 sets - 10 reps - 10 sec hold  - Lying Prone  - 6 x daily - 7 x weekly - 1 sets - 10 reps  - Supine Transversus Abdominis Bracing - Hands on Stomach  - 2 x daily - 7 x weekly - 2 sets - 10 reps - 5 hold  - Supine Transversus Abdominis Bracing with Double Leg Fallout  - 2 x daily - 7 x weekly - 2 sets - 10 reps  - Supine March  - 2 x daily - 7 x weekly - 2 sets - 10 reps  - Seated Pelvic Floor Contraction  - 2 x daily - 7 x weekly - 1 sets - 10 reps - 10 hold  - Supine Bridge  - 2 x daily - 7 x weekly - 2 sets - 10 reps  - Clamshell  - 2 x daily - 7 x weekly - 2 sets - 10 reps - 5 hold  - Seated Upright Posture Correction  - 2-3 x daily - 7 x weekly - 1 sets - 10 reps - 5-10 hold  - Supine Straight Leg Raises  - 2 x daily - 7 x weekly - 1 sets - 10 reps  - Supine Hip Adduction Isometric with Ball  - 2 x daily - 7 x weekly - 1 sets - 10 reps - 5 sec hold  - Prone Knee Flexion  - 2 x daily - 7 x weekly - 1 sets - 10 reps - 5 sec hold  - Hooklying Clamshell with Resistance  - 2 x daily - 7 x weekly - 1 sets - 10 reps - 5 sec hold  - Supine Lower Trunk Rotation  - 2 x daily - 7 x weekly - 1 sets - 10 reps  - Prone Hip Extension  - 2 x daily - 7 x weekly - 1 sets - 10 reps  - Quadruped Alternating Arm Lift  - 2 x daily - 7 x weekly - 1 sets - 10 reps  - Quadruped Alternating Leg Extensions  - 2 x daily - 7 x weekly - 1 sets - 10 reps  - Step Up  - 2 x daily - 7 x weekly - 1 sets - 10 reps  - Lateral Step Up  - 2 x daily - 7 x weekly - 1 sets - 10 reps  - Tandem Walking with Counter Support  - 2 x daily - 7 x weekly - 1 sets - 10 reps  - Side Stepping with Counter Support  - 2 x daily - 7 x weekly - 1 sets - 10 reps  - Seated Thoracic Lumbar Extension  - 2 x daily - 7 x weekly - 1 sets - 10 reps  - Sidelying Open Book Thoracic Lumbar Rotation and Extension  - 2 x daily - 7 x weekly - 1 sets - 10 reps    Patient Education  - Office Posture

## 2023-09-21 ENCOUNTER — EVALUATION (OUTPATIENT)
Dept: PHYSICAL THERAPY | Facility: CLINIC | Age: 28
End: 2023-09-21
Payer: COMMERCIAL

## 2023-09-21 DIAGNOSIS — M54.16 LUMBAR RADICULOPATHY: Primary | ICD-10-CM

## 2023-09-21 PROCEDURE — 97112 NEUROMUSCULAR REEDUCATION: CPT | Performed by: PHYSICAL THERAPIST

## 2023-09-21 PROCEDURE — 97110 THERAPEUTIC EXERCISES: CPT | Performed by: PHYSICAL THERAPIST

## 2023-09-21 NOTE — PROGRESS NOTES
PT Evaluation     Today's date: 2023  Patient name: Tara Hurtado  : 1995  MRN: [id number]  Referring provider: Amol Mays DO  Dx:   Encounter Diagnosis     ICD-10-CM    1. Lumbar radiculopathy  M54.16           Start Time:   Stop Time: 1520  Total time in clinic (min): 35 minutes    Assessment  Assessment details: Tara Hurtado was seen for an initial PT evaluation and 9 f/u sessions. Patient is a 28 y.o. male with diagnosis of LBP with radiculopathy and past medical history significant for left wrist arthrotomy, asthma, IBS, obesity. FOTO functional score shows improvement from 41% at intake to 57% today progressing towards predicted value of 64%. Findings of examination today show improvement with core activation and LE strength as well as increased mobility of lumbar extension. Continued pain and restriction with functional activity as well as limited mobility of thoracic spine likely contributing to limitation returning to PLOF. Continuation of skilled PT is indicated 2x/week for an additional 4 weeks. Impairments: abnormal gait, abnormal muscle firing, abnormal muscle tone, abnormal or restricted ROM, abnormal movement, activity intolerance, impaired balance, impaired physical strength, lacks appropriate home exercise program, pain with function, poor posture  and poor body mechanics  Functional limitations: lifting, pushing, pulling, bending, twisting, walking, stairs  Goals  STG (6 weeks)  1. Patient will have reported 0/10 pain in low back at rest. - MET   2. Improve patient's lumbar extension to 15 degrees for increased ability to take proper strides during ambulation. - PROGRESSING  3. Increase patient's bilateral single leg balance to 10 seconds for increased stability on stairs. - NT  LTG (12 weeks)  1. Patient's LE strength will be equal bilaterally for ability to ambulate and return to functional activities at PLOF. - PROGRESSING  2.  Patient will be able to return to work with 0/10 pain in low back. - PROGRESSING  3. Patient will be independent with home exercise program for continued maintenance post PT discharge. - PROGRESSING      Plan  Plan details: Progress note in 4 weeks. Patient would benefit from: skilled physical therapy  Planned modality interventions: unattended electrical stimulation, thermotherapy: hydrocollator packs, cryotherapy and traction  Planned therapy interventions: manual therapy, neuromuscular re-education, self care, therapeutic activities, therapeutic exercise, home exercise program and gait training  Frequency: 2x week  Duration in weeks: 12  Plan of Care beginning date: 2023  Plan of Care expiration date: 2023  Treatment plan discussed with: patient and PTA        Subjective Evaluation    History of Present Illness  Date of onset: 2023: Patient states 50% improvement since the start of PT. States he still can have "really bad days", last being last night with pain in low back radiating into L>R LE to toes. Will have increased pain while at work, does not matter if sitting or standing. Pain causes difficulty walking. Mechanism of injury: Pepe Panchal is a 28 y.o. male who presents to outpatient Physical Therapy today with complaints of low back pain. States he has been working in [place] for [employer] with some chronic back pain. 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 Was walking in [place] wrapping low and when came up higher felt a pop in back with pain causing him to go home early. Did have MRI which was (+) for bulging disc and nerve irritation. PCP referred to PT. Patient Goals  Patient goals for therapy: decreased pain  Patient goal: "I just want the pain to go away". Basketball, would like to get out of the house more.    Pain      Current pain ratin  4  At best pain rating: 3  0  At worst pain rating: 10 11  Location: Left low back into LLE to foot  Quality: radiating  Relieving factors: medications  Exacerbated by: prolonged position. Social Support  Steps to enter house: yes  Stairs in house: no   Lives in: apartment    Employment status: working ([employer])    Diagnostic Tests  MRI studies: abnormal        Objective     Concurrent Complaints  Positive for saddle (S4) numbness. Negative for bladder dysfunction, bowel dysfunction, history of trauma and infection  9/21: (-) saddle numbness    Palpation   Left   Hypertonic in the lumbar paraspinals and quadratus lumborum. Hypotonic in the erector spinae. Tenderness of the erector spinae, lumbar paraspinals and quadratus lumborum. Trigger point to quadratus lumborum. Right   Hypertonic in the erector spinae and lumbar paraspinals. Tenderness of the erector spinae and lumbar paraspinals.      Neurological Testing     Sensation     Lumbar   Left   Diminished: light touch    Right   Intact: light touch    Comments   Left light touch: S1  9/21: WNL bilaterally to light touch    Reflexes   Left   Babinski sign: negative    Right   Babinski sign: negative    Active Range of Motion     Lumbar   Flexion:  Restriction level: minimal  9/21: minimal, pain  Extension:  Restriction level: moderate  9/21: min/mod, pain  Left lateral flexion:  Restriction level: minimal  9/21: min  Right lateral flexion:  Restriction level: moderate  9/21: min, pain    Additional Active Range of Motion Details  Contralateral pull felt with SB    Joint Play     Hypermobile: L2 and L3     Hypomobile: T11, T12, L1, L4 and L5   Mechanical Assessment        Lumbar    Standing flexion: repeated movements   Pain location:peripheralized  Pain intensity: worse  Standing extension: repeated movements  Pain location: no change  Pain intensity: worse    Strength/Myotome Testing  9/21    Left Hip   Planes of Motion   Flexion: 4+    4+  Extension: 3+    3+  Abduction: 4-    4-    Right Hip   Planes of Motion   Flexion: 5  Extension: 4+    5  Abduction: 4    5    Left Knee   Flexion: 5  Extension: 5    Right Knee   Flexion: 5  Extension: 5    Left Ankle/Foot   Dorsiflexion: 4    4+    Right Ankle/Foot   Dorsiflexion: 5    Muscle Activation     Additional Muscle Activation Details  Activation of TrA with resisted SLR R= min L=min  9/21: mod bilaterally    Tests     Lumbar     Left   Negative passive SLR. Right   Negative passive SLR. Left Pelvic Girdle/Sacrum   Negative: active SLR test.     Right Pelvic Girdle/Sacrum   Negative: active SLR test.     Additional Tests Details  Hamstring 90/90 SLR R=(40) L=(30)  9/21: (40) bilaterally  (-) supine to sit test  pull symptoms with manual lumbar traction      General Comments:      Lumbar Comments  Gait: forward trunk lean, lateral shift      FOTO: 41% function, 64% predicted function   9/21: 57%           Precautions: none noted  Access code:  [email or web address]  Progress note: 10/21  POC: 11/21    Manuals 9/7 9/11 9/14 9/18 9/21   PA lumbar/ low thoracic mobs        PPU with OP        traction        QL stretch        Neuro Re-Ed     Core testing   Posture over correction :10x10 10" x10 :10x10 :10x10    Core brace        Knee fall out x10 B/L x10 Odin X10 B/L     Supine march x10 x10 x10 10x    SLR with core brace 2x5 B/L  Ext x10 2x5 Odin  Ext 10x Flex x10 B/L  Ext x10 B/L Flex x10 B/L  Ext x10 B/L    Bird dog UE x10  LE x10 UE 10x  LE 10x UE x10  LEx10  UE+LE x10 LEx10  UE+LE x10    Prone glut set        UBE 90/70 x6 min 90/70 x 6 mins 90/70 x6 min 90/70 x6 min alt 90/70 x6 min alt   Sciatic flossing 10x supine 90/90 bilat 10x supine 90/90 Odin 10x supine 90/90 Odin 10x supine 90/90 Odin        Anti rotation    Ther Ex     HEP review           Thoracic ext *x10 10x x10 10x with towel roll    Standing lumbar extension x10  OP x10 10x  OP 10x 10x   // bars 10x    PPU x10 x10 x10 10x    Bridge hold   10x    Prone knee flex        Side step nv  NV     Heel to toe nv  NV     LTR x10 10x x15 15x    Quadruped LTR Supine ball squeeze :05x10 5"x10 :05x15     Supine clamshell Side lying x10 B/L S/L  10x Odin S/L x15 B/L S/L x15 B/L    Open books *x10 ea 10x ea x15 ea x15 ea    QL stretch        Piriformis stretch        Hip flexor stretch        Hamstring stretch        Ther Activity        Step ups fwd/lat   NV 6" 10x ea    Sit to stand        Gait Training                        Modalities        Lumbar mechanical traction                  Access Code: [email or web address]  URL: [email or web address]  Date: 09/07/2023  Prepared by: Reny Ferrer    Exercises  - Standing Lumbar Extension  - 6 x daily - 7 x weekly - 1 sets - 10 reps  - Prone Press Up  - 6 x daily - 7 x weekly - 1 sets - 10 reps - 10 sec hold  - Lying Prone  - 6 x daily - 7 x weekly - 1 sets - 10 reps  - Supine Transversus Abdominis Bracing - Hands on Stomach  - 2 x daily - 7 x weekly - 2 sets - 10 reps - 5 hold  - Supine Transversus Abdominis Bracing with Double Leg Fallout  - 2 x daily - 7 x weekly - 2 sets - 10 reps  - Supine March  - 2 x daily - 7 x weekly - 2 sets - 10 reps  - Seated Pelvic Floor Contraction  - 2 x daily - 7 x weekly - 1 sets - 10 reps - 10 hold  - Supine Bridge  - 2 x daily - 7 x weekly - 2 sets - 10 reps  - Clamshell  - 2 x daily - 7 x weekly - 2 sets - 10 reps - 5 hold  - Seated Upright Posture Correction  - 2-3 x daily - 7 x weekly - 1 sets - 10 reps - 5-10 hold  - Supine Straight Leg Raises  - 2 x daily - 7 x weekly - 1 sets - 10 reps  - Supine Hip Adduction Isometric with Ball  - 2 x daily - 7 x weekly - 1 sets - 10 reps - 5 sec hold  - Prone Knee Flexion  - 2 x daily - 7 x weekly - 1 sets - 10 reps - 5 sec hold  - Hooklying Clamshell with Resistance  - 2 x daily - 7 x weekly - 1 sets - 10 reps - 5 sec hold  - Supine Lower Trunk Rotation  - 2 x daily - 7 x weekly - 1 sets - 10 reps  - Prone Hip Extension  - 2 x daily - 7 x weekly - 1 sets - 10 reps  - Quadruped Alternating Arm Lift  - 2 x daily - 7 x weekly - 1 sets - 10 reps  - Quadruped Alternating Leg Extensions  - 2 x daily - 7 x weekly - 1 sets - 10 reps  - Step Up  - 2 x daily - 7 x weekly - 1 sets - 10 reps  - Lateral Step Up  - 2 x daily - 7 x weekly - 1 sets - 10 reps  - Tandem Walking with Counter Support  - 2 x daily - 7 x weekly - 1 sets - 10 reps  - Side Stepping with Counter Support  - 2 x daily - 7 x weekly - 1 sets - 10 reps  - Seated Thoracic Lumbar Extension  - 2 x daily - 7 x weekly - 1 sets - 10 reps  - Sidelying Open Book Thoracic Lumbar Rotation and Extension  - 2 x daily - 7 x weekly - 1 sets - 10 reps    Patient Education  - Office Posture

## 2023-09-22 ENCOUNTER — APPOINTMENT (OUTPATIENT)
Dept: PHYSICAL THERAPY | Facility: CLINIC | Age: 28
End: 2023-09-22
Payer: COMMERCIAL

## 2023-09-28 ENCOUNTER — OFFICE VISIT (OUTPATIENT)
Dept: PHYSICAL THERAPY | Facility: CLINIC | Age: 28
End: 2023-09-28
Payer: COMMERCIAL

## 2023-09-28 DIAGNOSIS — M54.16 LUMBAR RADICULOPATHY: Primary | ICD-10-CM

## 2023-09-28 PROCEDURE — 97112 NEUROMUSCULAR REEDUCATION: CPT

## 2023-09-28 PROCEDURE — 97110 THERAPEUTIC EXERCISES: CPT

## 2023-09-28 NOTE — PROGRESS NOTES
Daily Note     Today's date: 2023  Patient name: Haley Galvan  : 1995  MRN: [id number]  Referring provider: Jose Carlos Mratinez DO  Dx:   Encounter Diagnosis     ICD-10-CM    1. Lumbar radiculopathy  M54.16                      Subjective: Pt reports overall improvements but had some flare ups while at work. Objective: See treatment diary below      Assessment: Pt fatigued with tx, reports no increased pain during or post tx. Plan: Continue per plan of care. Precautions: none noted  Access code:  [email or web address]  Progress note: 10/21  POC:     Manuals    PA lumbar/ low thoracic mobs         PPU with OP         traction         QL stretch         Neuro Re-Ed     Core testing    Posture over correction :10x10 10" x10 :10x10 :10x10     Core brace         Knee fall out x10 B/L x10 Odin X10 B/L      TA+kickouts      x10 ea   Supine march x10 x10 x10 10x  10x   S/L clamshells      x10 ea   SLR with core brace 2x5 B/L  Ext x10 2x5 Odin  Ext 10x Flex x10 B/L  Ext x10 B/L Flex x10 B/L  Ext x10 B/L     Bird dog UE x10  LE x10 UE 10x  LE 10x UE x10  LEx10  UE+LE x10 LEx10  UE+LE x10     Pall off press      Red 3" x10 ea   Prone glut set         UBE 90/70 x6 min 90/70 x 6 mins 90/70 x6 min 90/70 x6 min alt 90/70 x6 min alt 90/70 x6 min alt   Sciatic flossing 10x supine 90/90 bilat 10x supine 90/90 Odin 10x supine 90/90 Odin 10x supine 90/90 Odin  10x supine 90/90 Odin       Anti rotation     Ther Ex     HEP review             Thoracic ext *x10 10x x10 10x with towel roll     Standing lumbar extension x10  OP x10 10x  OP 10x 10x   // bars 10x  // bars 2x10   PPU x10 x10 x10 10x     Bridge hold   10x  W/ ball sq 5" x15   Prone knee flex         Side step nv  NV      Heel to toe nv  NV      LTR x10 10x x15 15x     Quadruped LTR         Supine ball squeeze :05x10 5"x10 :05x15      Supine clamshell Side lying x10 B/L S/L  10x Odin S/L x15 B/L S/L x15 B/L  S/L x15 ea   Open books *x10 ea 10x ea x15 ea x15 ea  x15 ea   QL stretch         Piriformis stretch         Hip flexor stretch         Hamstring stretch         Ther Activity         Step ups fwd/lat   NV 6" 10x ea     Sit to stand         Gait Training                           Modalities         Lumbar mechanical traction                    Access Code: [email or web address]  URL: [email or web address]  Date: 09/07/2023  Prepared by: Kassy Ferrer    Exercises  - Standing Lumbar Extension  - 6 x daily - 7 x weekly - 1 sets - 10 reps  - Prone Press Up  - 6 x daily - 7 x weekly - 1 sets - 10 reps - 10 sec hold  - Lying Prone  - 6 x daily - 7 x weekly - 1 sets - 10 reps  - Supine Transversus Abdominis Bracing - Hands on Stomach  - 2 x daily - 7 x weekly - 2 sets - 10 reps - 5 hold  - Supine Transversus Abdominis Bracing with Double Leg Fallout  - 2 x daily - 7 x weekly - 2 sets - 10 reps  - Supine March  - 2 x daily - 7 x weekly - 2 sets - 10 reps  - Seated Pelvic Floor Contraction  - 2 x daily - 7 x weekly - 1 sets - 10 reps - 10 hold  - Supine Bridge  - 2 x daily - 7 x weekly - 2 sets - 10 reps  - Clamshell  - 2 x daily - 7 x weekly - 2 sets - 10 reps - 5 hold  - Seated Upright Posture Correction  - 2-3 x daily - 7 x weekly - 1 sets - 10 reps - 5-10 hold  - Supine Straight Leg Raises  - 2 x daily - 7 x weekly - 1 sets - 10 reps  - Supine Hip Adduction Isometric with Ball  - 2 x daily - 7 x weekly - 1 sets - 10 reps - 5 sec hold  - Prone Knee Flexion  - 2 x daily - 7 x weekly - 1 sets - 10 reps - 5 sec hold  - Hooklying Clamshell with Resistance  - 2 x daily - 7 x weekly - 1 sets - 10 reps - 5 sec hold  - Supine Lower Trunk Rotation  - 2 x daily - 7 x weekly - 1 sets - 10 reps  - Prone Hip Extension  - 2 x daily - 7 x weekly - 1 sets - 10 reps  - Quadruped Alternating Arm Lift  - 2 x daily - 7 x weekly - 1 sets - 10 reps  - Quadruped Alternating Leg Extensions  - 2 x daily - 7 x weekly - 1 sets - 10 reps  - Step Up  - 2 x daily - 7 x weekly - 1 sets - 10 reps  - Lateral Step Up  - 2 x daily - 7 x weekly - 1 sets - 10 reps  - Tandem Walking with Counter Support  - 2 x daily - 7 x weekly - 1 sets - 10 reps  - Side Stepping with Counter Support  - 2 x daily - 7 x weekly - 1 sets - 10 reps  - Seated Thoracic Lumbar Extension  - 2 x daily - 7 x weekly - 1 sets - 10 reps  - Sidelying Open Book Thoracic Lumbar Rotation and Extension  - 2 x daily - 7 x weekly - 1 sets - 10 reps    Patient Education  - Office Posture

## 2023-10-02 ENCOUNTER — OFFICE VISIT (OUTPATIENT)
Dept: PHYSICAL THERAPY | Facility: CLINIC | Age: 28
End: 2023-10-02
Payer: COMMERCIAL

## 2023-10-02 DIAGNOSIS — M54.16 LUMBAR RADICULOPATHY: Primary | ICD-10-CM

## 2023-10-02 PROCEDURE — 97112 NEUROMUSCULAR REEDUCATION: CPT | Performed by: PHYSICAL THERAPIST

## 2023-10-02 PROCEDURE — 97110 THERAPEUTIC EXERCISES: CPT | Performed by: PHYSICAL THERAPIST

## 2023-10-02 NOTE — PROGRESS NOTES
Daily Note     Today's date: 10/2/2023  Patient name: Regina Washington  : 1995  MRN: [id number]  Referring provider: Eugenia Jefferson DO  Dx:   Encounter Diagnosis     ICD-10-CM    1. Lumbar radiculopathy  M54.16           Start Time: 1400  Stop Time: 8132  Total time in clinic (min): 42 minutes    Subjective: States overall he is feeling better. Will still occasional have sharp jabbing pain in LLE. Objective: See treatment diary below      Assessment: Tolerated treatment well. Decreased LLE symptoms post PPU activity. Patient would benefit from continued PT working on apparent lumbar extension bias and core stability. Plan: Continue per plan of care. Progress treatment as tolerated. Precautions: none noted  Access code:  [email or web address]  Progress note: 10/21  POC:     Manuals 10/2 9/11 9/14 9/18 9/21 9/28   PA lumbar/ low thoracic mobs         PPU with OP         traction         QL stretch         Neuro Re-Ed     Core testing    Posture over correction  10" x10 :10x10 :10x10     Core brace         Knee fall out 10x bilat x10 Odin X10 B/L      TA+kickouts      x10 ea   Supine march  x10 x10 10x  10x   S/L clamshells      x10 ea   SLR with core brace  2x5 Odin  Ext 10x Flex x10 B/L  Ext x10 B/L Flex x10 B/L  Ext x10 B/L     Bird dog LE 10x UE 10x  LE 10x UE x10  LEx10  UE+LE x10 LEx10  UE+LE x10     Pall off press Green 10x bilat     Red 3" x10 ea   Prone glut set         UBE Standing foam 90/70 6 min alt 90/70 x 6 mins 90/70 x6 min 90/70 x6 min alt 90/70 x6 min alt 90/70 x6 min alt   Sciatic flossing 10x supine 90/90 Odin 10x supine 90/90 Odin 10x supine 90/90 Odin 10x supine 90/90 Odin  10x supine 90/90 Odin       Anti rotation     Ther Ex     HEP review             Thoracic ext 10x with towel roll 10x x10 10x with towel roll     Standing lumbar extension // bars 2x10 10x  OP 10x 10x   // bars 10x  // bars 2x10   PPU 10x2 x10 x10 10x     Bridge W/ ball sq 5" x15   10x  W/ ball sq 5" x15   Prone knee flex         Side step   NV      Heel to toe   NV      LTR  10x x15 15x     Quadruped LTR         Supine ball squeeze  5"x10 :05x15      Supine clamshell S/L x15 ea S/L  10x Odin S/L x15 B/L S/L x15 B/L  S/L x15 ea   Open books :10x10 10x ea x15 ea x15 ea  x15 ea   QL stretch         Piriformis stretch         Hip flexor stretch         Hamstring stretch         Ther Activity         Step ups fwd/lat 10x ea 6"  NV 6" 10x ea     Sit to stand         Gait Training                           Modalities         Lumbar mechanical traction                    Access Code: [email or web address]  URL: [email or web address]  Date: 09/07/2023  Prepared by: Chito Ferrer    Exercises  - Standing Lumbar Extension  - 6 x daily - 7 x weekly - 1 sets - 10 reps  - Prone Press Up  - 6 x daily - 7 x weekly - 1 sets - 10 reps - 10 sec hold  - Lying Prone  - 6 x daily - 7 x weekly - 1 sets - 10 reps  - Supine Transversus Abdominis Bracing - Hands on Stomach  - 2 x daily - 7 x weekly - 2 sets - 10 reps - 5 hold  - Supine Transversus Abdominis Bracing with Double Leg Fallout  - 2 x daily - 7 x weekly - 2 sets - 10 reps  - Supine March  - 2 x daily - 7 x weekly - 2 sets - 10 reps  - Seated Pelvic Floor Contraction  - 2 x daily - 7 x weekly - 1 sets - 10 reps - 10 hold  - Supine Bridge  - 2 x daily - 7 x weekly - 2 sets - 10 reps  - Clamshell  - 2 x daily - 7 x weekly - 2 sets - 10 reps - 5 hold  - Seated Upright Posture Correction  - 2-3 x daily - 7 x weekly - 1 sets - 10 reps - 5-10 hold  - Supine Straight Leg Raises  - 2 x daily - 7 x weekly - 1 sets - 10 reps  - Supine Hip Adduction Isometric with Ball  - 2 x daily - 7 x weekly - 1 sets - 10 reps - 5 sec hold  - Prone Knee Flexion  - 2 x daily - 7 x weekly - 1 sets - 10 reps - 5 sec hold  - Hooklying Clamshell with Resistance  - 2 x daily - 7 x weekly - 1 sets - 10 reps - 5 sec hold  - Supine Lower Trunk Rotation  - 2 x daily - 7 x weekly - 1 sets - 10 reps  - Prone Hip Extension  - 2 x daily - 7 x weekly - 1 sets - 10 reps  - Quadruped Alternating Arm Lift  - 2 x daily - 7 x weekly - 1 sets - 10 reps  - Quadruped Alternating Leg Extensions  - 2 x daily - 7 x weekly - 1 sets - 10 reps  - Step Up  - 2 x daily - 7 x weekly - 1 sets - 10 reps  - Lateral Step Up  - 2 x daily - 7 x weekly - 1 sets - 10 reps  - Tandem Walking with Counter Support  - 2 x daily - 7 x weekly - 1 sets - 10 reps  - Side Stepping with Counter Support  - 2 x daily - 7 x weekly - 1 sets - 10 reps  - Seated Thoracic Lumbar Extension  - 2 x daily - 7 x weekly - 1 sets - 10 reps  - Sidelying Open Book Thoracic Lumbar Rotation and Extension  - 2 x daily - 7 x weekly - 1 sets - 10 reps    Patient Education  - Office Posture

## 2023-10-05 ENCOUNTER — OFFICE VISIT (OUTPATIENT)
Dept: PHYSICAL THERAPY | Facility: CLINIC | Age: 28
End: 2023-10-05
Payer: COMMERCIAL

## 2023-10-05 DIAGNOSIS — M54.16 LUMBAR RADICULOPATHY: Primary | ICD-10-CM

## 2023-10-05 PROCEDURE — 97112 NEUROMUSCULAR REEDUCATION: CPT

## 2023-10-05 PROCEDURE — 97530 THERAPEUTIC ACTIVITIES: CPT

## 2023-10-05 PROCEDURE — 97110 THERAPEUTIC EXERCISES: CPT

## 2023-10-05 NOTE — PROGRESS NOTES
Daily Note     Today's date: 10/5/2023  Patient name: Nimisha Beatty  : 1995  MRN: [id number]  Referring provider: Savage Galvez DO  Dx:   Encounter Diagnosis     ICD-10-CM    1. Lumbar radiculopathy  M54.16                      Subjective: Last night he had a lot of pain at work (8/10). Right sided pain more then left at work. On top of it a box of silverware fell on his left flank. He felt discouraged at the amount of pain. He is performing his program at home. Objective: See treatment diary below    Reviewed HEP, but no new ones added for home. Patient educated on unweighted options for back extensions prone if he is tired coming home from work and in pain. Assessment: Tolerated treatment well. Patient would benefit from continued PT for stretching and strengthening. Patient was able to add exercises to his program with little difficulty or discomfort. He seemed to understand all education on new exercises. Patient felt better with less pain when he left department. Plan: Continue per plan of care. Progress treatment as tolerated. Precautions: none noted  Access code:  [email or web address]  Progress note: 10/21  POC:     Manuals 10/2 10/5 9/14 9/18 9/21 9/28   PA lumbar/ low thoracic mobs         PPU with OP         traction         QL stretch         Neuro Re-Ed     Core testing    Posture over correction   :10x10 :10x10     Core brace         Knee fall out 10x bilat x10 B/L X10 B/L      TA+kickouts      x10 ea   Supine march   x10 10x  10x   S/L clamshells      x10 ea   SLR with core brace   Flex x10 B/L  Ext x10 B/L Flex x10 B/L  Ext x10 B/L     Bird dog LE 10x LE x10 UE x10  LEx10  UE+LE x10 LEx10  UE+LE x10     Pall off press Green 10x bilat Green 10x bilat    Red 3" x10 ea   Prone glut set         UBE Standing foam 90/70 6 min alt 90/70 x 6 mins 90/70 x6 min 90/70 x6 min alt 90/70 x6 min alt 90/70 x6 min alt   Sciatic flossing 10x supine 90/90 Odin Supine x10 90/90 B/L 10x supine 90/90 Odin 10x supine 90/90 Odin  10x supine 90/90 Odin       Anti rotation     Ther Ex     HEP review             Thoracic ext 10x with towel roll 10x with towel roll x10 10x with towel roll     Standing lumbar extension // bars 2x10 // bars 2x10 10x   // bars 10x  // bars 2x10   PPU 10x2 x10   w exhale  x10 10x     Bridge W/ ball sq 5" x15 W/ ball sq 5" x15  10x  W/ ball sq 5" x15   Prone knee flex  Single x10  Dbl x10       Side step   NV      Heel to toe   NV      LTR   x15 15x     Quadruped LTR  x10       Supine ball squeeze   :05x15      Supine clamshell S/L x15 ea S/L x15 ea S/L x15 B/L S/L x15 B/L  S/L x15 ea   Open books :10x10 :10x10 x15 ea x15 ea  x15 ea   QL stretch         Piriformis stretch         Hip flexor stretch         Hamstring stretch         Ther Activity         Step ups fwd/lat 10x ea 6" 10x ea 6" NV 6" 10x ea     Sit to stand         Gait Training                           Modalities         Lumbar mechanical traction                    Access Code: [email or web address]  URL: [email or web address]  Date: 09/07/2023  Prepared by: Penelope Ferrer    Exercises  - Standing Lumbar Extension  - 6 x daily - 7 x weekly - 1 sets - 10 reps  - Prone Press Up  - 6 x daily - 7 x weekly - 1 sets - 10 reps - 10 sec hold  - Lying Prone  - 6 x daily - 7 x weekly - 1 sets - 10 reps  - Supine Transversus Abdominis Bracing - Hands on Stomach  - 2 x daily - 7 x weekly - 2 sets - 10 reps - 5 hold  - Supine Transversus Abdominis Bracing with Double Leg Fallout  - 2 x daily - 7 x weekly - 2 sets - 10 reps  - Supine March  - 2 x daily - 7 x weekly - 2 sets - 10 reps  - Seated Pelvic Floor Contraction  - 2 x daily - 7 x weekly - 1 sets - 10 reps - 10 hold  - Supine Bridge  - 2 x daily - 7 x weekly - 2 sets - 10 reps  - Clamshell  - 2 x daily - 7 x weekly - 2 sets - 10 reps - 5 hold  - Seated Upright Posture Correction  - 2-3 x daily - 7 x weekly - 1 sets - 10 reps - 5-10 hold  - Supine Straight Leg Raises  - 2 x daily - 7 x weekly - 1 sets - 10 reps  - Supine Hip Adduction Isometric with Ball  - 2 x daily - 7 x weekly - 1 sets - 10 reps - 5 sec hold  - Prone Knee Flexion  - 2 x daily - 7 x weekly - 1 sets - 10 reps - 5 sec hold  - Hooklying Clamshell with Resistance  - 2 x daily - 7 x weekly - 1 sets - 10 reps - 5 sec hold  - Supine Lower Trunk Rotation  - 2 x daily - 7 x weekly - 1 sets - 10 reps  - Prone Hip Extension  - 2 x daily - 7 x weekly - 1 sets - 10 reps  - Quadruped Alternating Arm Lift  - 2 x daily - 7 x weekly - 1 sets - 10 reps  - Quadruped Alternating Leg Extensions  - 2 x daily - 7 x weekly - 1 sets - 10 reps  - Step Up  - 2 x daily - 7 x weekly - 1 sets - 10 reps  - Lateral Step Up  - 2 x daily - 7 x weekly - 1 sets - 10 reps  - Tandem Walking with Counter Support  - 2 x daily - 7 x weekly - 1 sets - 10 reps  - Side Stepping with Counter Support  - 2 x daily - 7 x weekly - 1 sets - 10 reps  - Seated Thoracic Lumbar Extension  - 2 x daily - 7 x weekly - 1 sets - 10 reps  - Sidelying Open Book Thoracic Lumbar Rotation and Extension  - 2 x daily - 7 x weekly - 1 sets - 10 reps    Patient Education  - Office Posture

## 2023-10-09 ENCOUNTER — OFFICE VISIT (OUTPATIENT)
Dept: PHYSICAL THERAPY | Facility: CLINIC | Age: 28
End: 2023-10-09
Payer: COMMERCIAL

## 2023-10-09 DIAGNOSIS — M54.16 LUMBAR RADICULOPATHY: Primary | ICD-10-CM

## 2023-10-09 PROCEDURE — 97110 THERAPEUTIC EXERCISES: CPT | Performed by: PHYSICAL THERAPIST

## 2023-10-09 PROCEDURE — 97112 NEUROMUSCULAR REEDUCATION: CPT | Performed by: PHYSICAL THERAPIST

## 2023-10-09 NOTE — PROGRESS NOTES
Daily Note     Today's date: 10/9/2023  Patient name: Osmel Tang  : 1995  MRN: [id number]  Referring provider: Lukas Hunt DO  Dx:   Encounter Diagnosis     ICD-10-CM    1. Lumbar radiculopathy  M54.16           Start Time: 1400  Stop Time: 1117  Total time in clinic (min): 45 minutes    Subjective: States he has felt pretty good the past few days, but has also been off work. Objective: See treatment diary below      Assessment: Tolerated treatment well. Showing improvement with core stability during dynamic activities. No c/o increased pain throughout session today. Performed exercises with good mechanics and minimal verbal cueing. Patient would benefit from continued PT      Plan: Continue per plan of care. Progress treatment as tolerated. Precautions: none noted  Access code:  [email or web address]  Progress note: 10/21  POC:     Manuals 10/2 10/5 10/9 9/18 9/21 9/28   PA lumbar/ low thoracic mobs         PPU with OP         traction         QL stretch         Neuro Re-Ed     Core testing    Posture over correction    :10x10     Core brace         Knee fall out 10x bilat x10 B/L 15x bilat      TA+kickouts      x10 ea   Supine march    10x  10x   S/L clamshells      x10 ea   SLR with core brace    Flex x10 B/L  Ext x10 B/L     Bird dog LE 10x LE x10 LE 10x LEx10  UE+LE x10     Pall off press Green 10x bilat Green 10x bilat Green 10x bilat   Red 3" x10 ea   Prone glut set         UBE Standing foam 90/70 6 min alt 90/70 x 6 mins  min standing 90/70 x6 min alt 90/70 x6 min alt 90/70 x6 min alt   Sciatic flossing 10x supine 90/90 Odin Supine x10 90/90 B/L  10x supine 90/90 Odin  10x supine 90/90 Odin       Anti rotation     Ther Ex     HEP review             Thoracic ext 10x with towel roll 10x with towel roll 10x with towel roll 10x with towel roll     Standing lumbar extension // bars 2x10 // bars 2x10 High table 10x2 // bars 10x  // bars 2x10   PPU 10x2 x10   w exhale  x10   w exhale 10x     Bridge W/ ball sq 5" x15 W/ ball sq 5" x15 W/ ball sq 5" x15 10x  W/ ball sq 5" x15   Prone knee flex  Single x10  Dbl x10 In PPU  Single x10  Dbl x10      Side step         Heel to toe         LTR    15x     Quadruped LTR  x10 10x      Supine ball squeeze         Supine clamshell S/L x15 ea S/L x15 ea S/L x15 ea S/L x15 B/L  S/L x15 ea   Open books :10x10 :10x10 :10x10 x15 ea  x15 ea   QL stretch         Piriformis stretch         Hip flexor stretch         Hamstring stretch         Ther Activity         Step ups fwd/lat 10x ea 6" 10x ea 6" 10x ea 6" 6" 10x ea     Sit to stand         Gait Training                           Modalities         Lumbar mechanical traction                    Access Code: [email or web address]  URL: [email or web address]  Date: 09/07/2023  Prepared by: Yordan Ferrer    Exercises  - Standing Lumbar Extension  - 6 x daily - 7 x weekly - 1 sets - 10 reps  - Prone Press Up  - 6 x daily - 7 x weekly - 1 sets - 10 reps - 10 sec hold  - Lying Prone  - 6 x daily - 7 x weekly - 1 sets - 10 reps  - Supine Transversus Abdominis Bracing - Hands on Stomach  - 2 x daily - 7 x weekly - 2 sets - 10 reps - 5 hold  - Supine Transversus Abdominis Bracing with Double Leg Fallout  - 2 x daily - 7 x weekly - 2 sets - 10 reps  - Supine March  - 2 x daily - 7 x weekly - 2 sets - 10 reps  - Seated Pelvic Floor Contraction  - 2 x daily - 7 x weekly - 1 sets - 10 reps - 10 hold  - Supine Bridge  - 2 x daily - 7 x weekly - 2 sets - 10 reps  - Clamshell  - 2 x daily - 7 x weekly - 2 sets - 10 reps - 5 hold  - Seated Upright Posture Correction  - 2-3 x daily - 7 x weekly - 1 sets - 10 reps - 5-10 hold  - Supine Straight Leg Raises  - 2 x daily - 7 x weekly - 1 sets - 10 reps  - Supine Hip Adduction Isometric with Ball  - 2 x daily - 7 x weekly - 1 sets - 10 reps - 5 sec hold  - Prone Knee Flexion  - 2 x daily - 7 x weekly - 1 sets - 10 reps - 5 sec hold  - Hooklying Clamshell with Resistance  - 2 x daily - 7 x weekly - 1 sets - 10 reps - 5 sec hold  - Supine Lower Trunk Rotation  - 2 x daily - 7 x weekly - 1 sets - 10 reps  - Prone Hip Extension  - 2 x daily - 7 x weekly - 1 sets - 10 reps  - Quadruped Alternating Arm Lift  - 2 x daily - 7 x weekly - 1 sets - 10 reps  - Quadruped Alternating Leg Extensions  - 2 x daily - 7 x weekly - 1 sets - 10 reps  - Step Up  - 2 x daily - 7 x weekly - 1 sets - 10 reps  - Lateral Step Up  - 2 x daily - 7 x weekly - 1 sets - 10 reps  - Tandem Walking with Counter Support  - 2 x daily - 7 x weekly - 1 sets - 10 reps  - Side Stepping with Counter Support  - 2 x daily - 7 x weekly - 1 sets - 10 reps  - Seated Thoracic Lumbar Extension  - 2 x daily - 7 x weekly - 1 sets - 10 reps  - Sidelying Open Book Thoracic Lumbar Rotation and Extension  - 2 x daily - 7 x weekly - 1 sets - 10 reps    Patient Education  - Office Posture

## 2023-10-12 ENCOUNTER — OFFICE VISIT (OUTPATIENT)
Dept: PHYSICAL THERAPY | Facility: CLINIC | Age: 28
End: 2023-10-12
Payer: COMMERCIAL

## 2023-10-12 DIAGNOSIS — M54.16 LUMBAR RADICULOPATHY: Primary | ICD-10-CM

## 2023-10-12 PROCEDURE — 97110 THERAPEUTIC EXERCISES: CPT | Performed by: PHYSICAL THERAPIST

## 2023-10-12 PROCEDURE — 97112 NEUROMUSCULAR REEDUCATION: CPT | Performed by: PHYSICAL THERAPIST

## 2023-10-12 NOTE — PROGRESS NOTES
Daily Note     Today's date: 10/12/2023  Patient name: Rebecca Chester  : 1995  MRN: [id number]  Referring provider: Travis Lopez DO  Dx:   Encounter Diagnosis     ICD-10-CM    1. Lumbar radiculopathy  M54.16                      Subjective: The patient states that his back is feeling okay today, no pain at start of session. Notes that yesterday he developed pain/numbness/tingling along his R wrist, lateral border of R hand and into ring finger and pinky. Objective: See treatment diary below      Assessment: Tolerated treatment well with no increase in back pain during session. Modified PPU with knee flexion to GABRIELA with knee flexion 2* wrist/hand pain. Also gave patient dumbbell to  while in Qped so wrist could stay in more neutral position. Added S/L Hip Abd, patient able to complete without difficulty. Patient would benefit from continued PT to improve function. Plan: Continue per plan of care. Precautions: none noted  Access code:  [email or web address]  Progress note: 10/21  POC:     Manuals 10/2 10/5 10/9 10/12 9/21 9/28   PA lumbar/ low thoracic mobs         PPU with OP         traction         QL stretch         Neuro Re-Ed     Core testing    Posture over correction         Core brace         Knee fall out 10x bilat x10 B/L 15x bilat 15x Odin     TA+kickouts      x10 ea   Supine march      10x   S/L clamshells      x10 ea   SLR with core brace         Bird dog LE 10x LE x10 LE 10x LEx10       Pall off press Green 10x bilat Green 10x bilat Green 10x bilat Black 10x Odin  Red 3" x10 ea   Prone glut set         UBE Standing foam 90/70 6 min alt 90/70 x 6 mins  min standing 90/70  10 min alt  Standing 90/70 x6 min alt 90/70 x6 min alt   Sciatic flossing 10x supine 90/90 Odin Supine x10 90/90 B/L    10x supine 90/90 Odin   Qped hip hike    10x Odin     Ther Ex     HEP review             Thoracic ext 10x with towel roll 10x with towel roll 10x with towel roll 10x with towel roll Standing lumbar extension // bars 2x10 // bars 2x10 High table 10x2 High table   10x2  // bars 2x10   PPU 10x2 x10   w exhale  x10   w exhale  10x w/exhale     Bridge W/ ball sq 5" x15 W/ ball sq 5" x15 W/ ball sq 5" x15 W/ball squeeze 5"x15  W/ ball sq 5" x15   Prone knee flex  Single x10  Dbl x10 In PPU  Single x10  Dbl x10 GABRIELA  Single 10x  Dbl 10x     Side step         Heel to toe         LTR         Quadruped LTR  x10 10x 10x     S/L Hip Abd    15x Odin      Supine ball squeeze         Supine clamshell S/L x15 ea S/L x15 ea S/L x15 ea S/L x15 B/L  S/L x15 ea   Open books :10x10 :10x10 :10x10 10"x10  x15 ea   QL stretch         Piriformis stretch         Hip flexor stretch         Hamstring stretch         Ther Activity         Step ups fwd/lat 10x ea 6" 10x ea 6" 10x ea 6" 6" 10x ea     Sit to stand         Gait Training                           Modalities         Lumbar mechanical traction                    Access Code: [email or web address]  URL: [email or web address]  Date: 09/07/2023  Prepared by: Leonila Ferrer    Exercises  - Standing Lumbar Extension  - 6 x daily - 7 x weekly - 1 sets - 10 reps  - Prone Press Up  - 6 x daily - 7 x weekly - 1 sets - 10 reps - 10 sec hold  - Lying Prone  - 6 x daily - 7 x weekly - 1 sets - 10 reps  - Supine Transversus Abdominis Bracing - Hands on Stomach  - 2 x daily - 7 x weekly - 2 sets - 10 reps - 5 hold  - Supine Transversus Abdominis Bracing with Double Leg Fallout  - 2 x daily - 7 x weekly - 2 sets - 10 reps  - Supine March  - 2 x daily - 7 x weekly - 2 sets - 10 reps  - Seated Pelvic Floor Contraction  - 2 x daily - 7 x weekly - 1 sets - 10 reps - 10 hold  - Supine Bridge  - 2 x daily - 7 x weekly - 2 sets - 10 reps  - Clamshell  - 2 x daily - 7 x weekly - 2 sets - 10 reps - 5 hold  - Seated Upright Posture Correction  - 2-3 x daily - 7 x weekly - 1 sets - 10 reps - 5-10 hold  - Supine Straight Leg Raises  - 2 x daily - 7 x weekly - 1 sets - 10 reps  - Supine Hip Adduction Isometric with Ball  - 2 x daily - 7 x weekly - 1 sets - 10 reps - 5 sec hold  - Prone Knee Flexion  - 2 x daily - 7 x weekly - 1 sets - 10 reps - 5 sec hold  - Hooklying Clamshell with Resistance  - 2 x daily - 7 x weekly - 1 sets - 10 reps - 5 sec hold  - Supine Lower Trunk Rotation  - 2 x daily - 7 x weekly - 1 sets - 10 reps  - Prone Hip Extension  - 2 x daily - 7 x weekly - 1 sets - 10 reps  - Quadruped Alternating Arm Lift  - 2 x daily - 7 x weekly - 1 sets - 10 reps  - Quadruped Alternating Leg Extensions  - 2 x daily - 7 x weekly - 1 sets - 10 reps  - Step Up  - 2 x daily - 7 x weekly - 1 sets - 10 reps  - Lateral Step Up  - 2 x daily - 7 x weekly - 1 sets - 10 reps  - Tandem Walking with Counter Support  - 2 x daily - 7 x weekly - 1 sets - 10 reps  - Side Stepping with Counter Support  - 2 x daily - 7 x weekly - 1 sets - 10 reps  - Seated Thoracic Lumbar Extension  - 2 x daily - 7 x weekly - 1 sets - 10 reps  - Sidelying Open Book Thoracic Lumbar Rotation and Extension  - 2 x daily - 7 x weekly - 1 sets - 10 reps    Patient Education  - Office Posture

## 2023-10-16 ENCOUNTER — APPOINTMENT (OUTPATIENT)
Dept: PHYSICAL THERAPY | Facility: CLINIC | Age: 28
End: 2023-10-16
Payer: COMMERCIAL

## 2023-12-22 ENCOUNTER — OFFICE VISIT (OUTPATIENT)
Dept: URGENT CARE | Facility: CLINIC | Age: 28
End: 2023-12-22
Payer: COMMERCIAL

## 2023-12-22 ENCOUNTER — APPOINTMENT (EMERGENCY)
Dept: RADIOLOGY | Facility: HOSPITAL | Age: 28
End: 2023-12-22
Payer: COMMERCIAL

## 2023-12-22 ENCOUNTER — HOSPITAL ENCOUNTER (EMERGENCY)
Facility: HOSPITAL | Age: 28
Discharge: HOME | End: 2023-12-22
Attending: EMERGENCY MEDICINE
Payer: COMMERCIAL

## 2023-12-22 VITALS
DIASTOLIC BLOOD PRESSURE: 68 MMHG | BODY MASS INDEX: 32.18 KG/M2 | SYSTOLIC BLOOD PRESSURE: 140 MMHG | RESPIRATION RATE: 18 BRPM | HEIGHT: 67 IN | WEIGHT: 205 LBS | HEART RATE: 77 BPM | OXYGEN SATURATION: 97 % | TEMPERATURE: 98.6 F

## 2023-12-22 VITALS
TEMPERATURE: 97 F | HEART RATE: 80 BPM | BODY MASS INDEX: 32.18 KG/M2 | OXYGEN SATURATION: 98 % | WEIGHT: 205 LBS | HEIGHT: 67 IN | SYSTOLIC BLOOD PRESSURE: 129 MMHG | DIASTOLIC BLOOD PRESSURE: 59 MMHG | RESPIRATION RATE: 18 BRPM

## 2023-12-22 DIAGNOSIS — R42 DIZZINESS AND GIDDINESS: ICD-10-CM

## 2023-12-22 DIAGNOSIS — Z20.822 EXPOSURE TO COVID-19 VIRUS: ICD-10-CM

## 2023-12-22 DIAGNOSIS — R00.2 PALPITATIONS: ICD-10-CM

## 2023-12-22 DIAGNOSIS — B34.9 VIRAL SYNDROME: ICD-10-CM

## 2023-12-22 DIAGNOSIS — R07.9 CHEST PAIN, UNSPECIFIED: Primary | ICD-10-CM

## 2023-12-22 DIAGNOSIS — R06.02 SHORTNESS OF BREATH: ICD-10-CM

## 2023-12-22 DIAGNOSIS — J45.909 ASTHMA: ICD-10-CM

## 2023-12-22 DIAGNOSIS — R07.9 CHEST PAIN, UNSPECIFIED TYPE: Primary | ICD-10-CM

## 2023-12-22 LAB
ALBUMIN SERPL BCP-MCNC: 4.5 G/DL (ref 3.5–5)
ALP SERPL-CCNC: 78 U/L (ref 34–104)
ALT SERPL W P-5'-P-CCNC: 44 U/L (ref 7–52)
ANION GAP SERPL CALCULATED.3IONS-SCNC: 8 MMOL/L
AST SERPL W P-5'-P-CCNC: 22 U/L (ref 13–39)
BASOPHILS # BLD AUTO: 0.03 THOUSANDS/ÂΜL (ref 0–0.1)
BASOPHILS NFR BLD AUTO: 1 % (ref 0–1)
BILIRUB SERPL-MCNC: 1.17 MG/DL (ref 0.2–1)
BUN SERPL-MCNC: 13 MG/DL (ref 5–25)
CALCIUM SERPL-MCNC: 9.4 MG/DL (ref 8.4–10.2)
CARDIAC TROPONIN I PNL SERPL HS: 3 NG/L
CHLORIDE SERPL-SCNC: 102 MMOL/L (ref 96–108)
CO2 SERPL-SCNC: 28 MMOL/L (ref 21–32)
CREAT SERPL-MCNC: 1.11 MG/DL (ref 0.6–1.3)
D DIMER PPP FEU-MCNC: 0.44 UG/ML FEU
EOSINOPHIL # BLD AUTO: 0.38 THOUSAND/ÂΜL (ref 0–0.61)
EOSINOPHIL NFR BLD AUTO: 6 % (ref 0–6)
ERYTHROCYTE [DISTWIDTH] IN BLOOD BY AUTOMATED COUNT: 12.6 % (ref 11.6–15.1)
GFR SERPL CREATININE-BSD FRML MDRD: 89 ML/MIN/1.73SQ M
GLUCOSE SERPL-MCNC: 109 MG/DL (ref 65–140)
HCT VFR BLD AUTO: 46.2 % (ref 36.5–49.3)
HGB BLD-MCNC: 15.3 G/DL (ref 12–17)
IMM GRANULOCYTES # BLD AUTO: 0.01 THOUSAND/UL (ref 0–0.2)
IMM GRANULOCYTES NFR BLD AUTO: 0 % (ref 0–2)
LYMPHOCYTES # BLD AUTO: 2.28 THOUSANDS/ÂΜL (ref 0.6–4.47)
LYMPHOCYTES NFR BLD AUTO: 35 % (ref 14–44)
MCH RBC QN AUTO: 28.8 PG (ref 26.8–34.3)
MCHC RBC AUTO-ENTMCNC: 33.1 G/DL (ref 31.4–37.4)
MCV RBC AUTO: 87 FL (ref 82–98)
MONOCYTES # BLD AUTO: 0.39 THOUSAND/ÂΜL (ref 0.17–1.22)
MONOCYTES NFR BLD AUTO: 6 % (ref 4–12)
NEUTROPHILS # BLD AUTO: 3.5 THOUSANDS/ÂΜL (ref 1.85–7.62)
NEUTS SEG NFR BLD AUTO: 52 % (ref 43–75)
NRBC BLD AUTO-RTO: 0 /100 WBCS
PLATELET # BLD AUTO: 351 THOUSANDS/UL (ref 149–390)
PMV BLD AUTO: 10.1 FL (ref 8.9–12.7)
POTASSIUM SERPL-SCNC: 3.6 MMOL/L (ref 3.5–5.3)
PROT SERPL-MCNC: 7.3 G/DL (ref 6.4–8.4)
RBC # BLD AUTO: 5.31 MILLION/UL (ref 3.88–5.62)
SODIUM SERPL-SCNC: 138 MMOL/L (ref 135–147)
TSH SERPL DL<=0.05 MIU/L-ACNC: 0.86 UIU/ML (ref 0.45–4.5)
WBC # BLD AUTO: 6.59 THOUSAND/UL (ref 4.31–10.16)

## 2023-12-22 PROCEDURE — 99284 EMERGENCY DEPT VISIT MOD MDM: CPT | Performed by: EMERGENCY MEDICINE

## 2023-12-22 PROCEDURE — 99285 EMERGENCY DEPT VISIT HI MDM: CPT

## 2023-12-22 PROCEDURE — 87636 SARSCOV2 & INF A&B AMP PRB: CPT | Performed by: PHYSICIAN ASSISTANT

## 2023-12-22 PROCEDURE — 94644 CONT INHLJ TX 1ST HOUR: CPT

## 2023-12-22 PROCEDURE — 93005 ELECTROCARDIOGRAM TRACING: CPT | Performed by: PHYSICIAN ASSISTANT

## 2023-12-22 PROCEDURE — 85379 FIBRIN DEGRADATION QUANT: CPT | Performed by: EMERGENCY MEDICINE

## 2023-12-22 PROCEDURE — 99213 OFFICE O/P EST LOW 20 MIN: CPT | Performed by: PHYSICIAN ASSISTANT

## 2023-12-22 PROCEDURE — 96374 THER/PROPH/DIAG INJ IV PUSH: CPT

## 2023-12-22 PROCEDURE — 80053 COMPREHEN METABOLIC PANEL: CPT | Performed by: EMERGENCY MEDICINE

## 2023-12-22 PROCEDURE — 85025 COMPLETE CBC W/AUTO DIFF WBC: CPT | Performed by: EMERGENCY MEDICINE

## 2023-12-22 PROCEDURE — 36415 COLL VENOUS BLD VENIPUNCTURE: CPT | Performed by: EMERGENCY MEDICINE

## 2023-12-22 PROCEDURE — 99213 OFFICE O/P EST LOW 20 MIN: CPT | Performed by: FAMILY MEDICINE

## 2023-12-22 PROCEDURE — 71045 X-RAY EXAM CHEST 1 VIEW: CPT

## 2023-12-22 PROCEDURE — 94760 N-INVAS EAR/PLS OXIMETRY 1: CPT

## 2023-12-22 PROCEDURE — 93005 ELECTROCARDIOGRAM TRACING: CPT

## 2023-12-22 PROCEDURE — 84484 ASSAY OF TROPONIN QUANT: CPT | Performed by: EMERGENCY MEDICINE

## 2023-12-22 PROCEDURE — 94640 AIRWAY INHALATION TREATMENT: CPT

## 2023-12-22 PROCEDURE — 84443 ASSAY THYROID STIM HORMONE: CPT | Performed by: EMERGENCY MEDICINE

## 2023-12-22 PROCEDURE — 96361 HYDRATE IV INFUSION ADD-ON: CPT

## 2023-12-22 RX ORDER — METHYLPREDNISOLONE 4 MG/1
TABLET ORAL
Qty: 21 TABLET | Refills: 0 | Status: SHIPPED | OUTPATIENT
Start: 2023-12-22 | End: 2024-01-02 | Stop reason: ALTCHOICE

## 2023-12-22 RX ORDER — ALBUTEROL SULFATE 2.5 MG/3ML
2.5 SOLUTION RESPIRATORY (INHALATION) EVERY 6 HOURS PRN
Qty: 75 ML | Refills: 0 | Status: SHIPPED | OUTPATIENT
Start: 2023-12-22 | End: 2024-01-02 | Stop reason: ALTCHOICE

## 2023-12-22 RX ORDER — SODIUM CHLORIDE 9 MG/ML
3 INJECTION INTRAVENOUS
Status: DISCONTINUED | OUTPATIENT
Start: 2023-12-22 | End: 2023-12-23 | Stop reason: HOSPADM

## 2023-12-22 RX ORDER — METHYLPREDNISOLONE SODIUM SUCCINATE 125 MG/2ML
125 INJECTION, POWDER, LYOPHILIZED, FOR SOLUTION INTRAMUSCULAR; INTRAVENOUS ONCE
Status: COMPLETED | OUTPATIENT
Start: 2023-12-22 | End: 2023-12-22

## 2023-12-22 RX ORDER — ALBUTEROL SULFATE 2.5 MG/3ML
2.5 SOLUTION RESPIRATORY (INHALATION) EVERY 6 HOURS PRN
Qty: 75 ML | Refills: 0 | Status: SHIPPED | OUTPATIENT
Start: 2023-12-22 | End: 2023-12-22

## 2023-12-22 RX ORDER — SODIUM CHLORIDE FOR INHALATION 0.9 %
12 VIAL, NEBULIZER (ML) INHALATION ONCE
Status: COMPLETED | OUTPATIENT
Start: 2023-12-22 | End: 2023-12-22

## 2023-12-22 RX ORDER — METHYLPREDNISOLONE 4 MG/1
TABLET ORAL
Qty: 21 TABLET | Refills: 0 | Status: SHIPPED | OUTPATIENT
Start: 2023-12-22 | End: 2023-12-22

## 2023-12-22 RX ADMIN — METHYLPREDNISOLONE SODIUM SUCCINATE 125 MG: 125 INJECTION, POWDER, FOR SOLUTION INTRAMUSCULAR; INTRAVENOUS at 21:22

## 2023-12-22 RX ADMIN — IPRATROPIUM BROMIDE 1 MG: 0.5 SOLUTION RESPIRATORY (INHALATION) at 21:07

## 2023-12-22 RX ADMIN — ALBUTEROL SULFATE 10 MG: 2.5 SOLUTION RESPIRATORY (INHALATION) at 21:07

## 2023-12-22 RX ADMIN — Medication 12 ML: at 21:07

## 2023-12-22 RX ADMIN — SODIUM CHLORIDE 1000 ML: 0.9 INJECTION, SOLUTION INTRAVENOUS at 21:21

## 2023-12-22 NOTE — Clinical Note
Arthur Barlow was seen and treated in our emergency department on 12/22/2023.    No restrictions            Diagnosis:     Arthur  may return to work on return date.    He may return on this date: 12/25/2023         If you have any questions or concerns, please don't hesitate to call.      Clarissa Jimenez RN    ______________________________           _______________          _______________  Hospital Representative                              Date                                Time

## 2023-12-23 LAB
ATRIAL RATE: 75 BPM
ATRIAL RATE: 77 BPM
FLUAV RNA RESP QL NAA+PROBE: NEGATIVE
FLUBV RNA RESP QL NAA+PROBE: NEGATIVE
P AXIS: 43 DEGREES
P AXIS: 50 DEGREES
PR INTERVAL: 132 MS
PR INTERVAL: 150 MS
QRS AXIS: 59 DEGREES
QRS AXIS: 73 DEGREES
QRSD INTERVAL: 90 MS
QRSD INTERVAL: 96 MS
QT INTERVAL: 360 MS
QT INTERVAL: 370 MS
QTC INTERVAL: 402 MS
QTC INTERVAL: 418 MS
SARS-COV-2 RNA RESP QL NAA+PROBE: POSITIVE
T WAVE AXIS: 26 DEGREES
T WAVE AXIS: 38 DEGREES
VENTRICULAR RATE: 75 BPM
VENTRICULAR RATE: 77 BPM

## 2023-12-23 NOTE — PATIENT INSTRUCTIONS
Patient is refusing ambulance transfer, he would like to go via private vehicle to [facility] emergency department for further evaluation, workup and treatment.  Hospital address verified-he agrees to go immediately to the emergency department.

## 2023-12-23 NOTE — ED PROVIDER NOTES
History  Chief Complaint   Patient presents with    Chest Pain     Has been going on for a couple of weeks. Sharp, consistent pain on the left side, runs down the left arm with numbness and tingling.     This has happened in the past and was associated with asthma.  Patient has a history of recurrent severe asthma.  Sees pulmonologist but has not in the last few months.  Notes he has been short of breath.  Some family history of PEs.  Has not used a nebulizer in the past few days.  Not currently on steroids but is needed multiple rounds recently.  Minimally productive cough.  Chest pain seems to happen at random, either at rest or with exertion.  States its diffuse pleuritic.  Radiates to left arm.        Prior to Admission Medications   Prescriptions Last Dose Informant Patient Reported? Taking?   Ketorolac Tromethamine (TORADOL ORAL PO)   Yes No   Sig: Take 10 mg by mouth   Patient not taking: Reported on 2023   albuterol (ProAir HFA) 90 mcg/act inhaler   No No   Sig: Inhale 2 puffs every 6 (six) hours as needed for wheezing   gabapentin (NEURONTIN) 300 mg capsule   No No   Simg in am and 600mg at HS   tiZANidine (Zanaflex) 4 mg tablet   No No   Sig: Take 0.5 tablets (2 mg total) by mouth every 8 (eight) hours as needed for muscle spasms   Patient not taking: Reported on 2023      Facility-Administered Medications: None       Past Medical History:   Diagnosis Date    Allergic     Asthma     Stomach pain        Past Surgical History:   Procedure Laterality Date    WRIST ARTHROTOMY Left        Family History   Problem Relation Age of Onset    Hypertension Mother     Alcohol abuse Father     Other Father         Tobacco abuse     I have reviewed and agree with the history as documented.    E-Cigarette/Vaping    E-Cigarette Use Never User      E-Cigarette/Vaping Substances    Nicotine No     THC No     CBD No     Flavoring No     Other No     Unknown No      Social History     Tobacco Use    Smoking  status: Former     Current packs/day: 0.00     Average packs/day: 0.5 packs/day for 5.0 years (2.5 ttl pk-yrs)     Types: Cigarettes     Start date: 6/15/2013     Quit date: 3/28/2018     Years since quittin.7     Passive exposure: Past    Smokeless tobacco: Never   Vaping Use    Vaping status: Never Used   Substance Use Topics    Alcohol use: Yes     Comment: occ    Drug use: Not Currently     Types: Marijuana     Comment: Used for 5 years 2159-4821       Review of Systems   Constitutional:  Negative for chills and fever.   Eyes:  Negative for visual disturbance.   Respiratory:  Positive for shortness of breath.    Cardiovascular:  Positive for chest pain. Negative for palpitations and leg swelling.   Gastrointestinal:  Negative for abdominal distention and abdominal pain.   Endocrine: Negative for polyuria.   Genitourinary:  Negative for decreased urine volume and dysuria.   Neurological:  Negative for dizziness, syncope and weakness.       Physical Exam  Physical Exam  Constitutional:       General: He is not in acute distress.     Appearance: He is well-developed. He is not ill-appearing, toxic-appearing or diaphoretic.   HENT:      Head: Normocephalic and atraumatic.   Eyes:      Conjunctiva/sclera: Conjunctivae normal.      Pupils: Pupils are equal, round, and reactive to light.   Cardiovascular:      Rate and Rhythm: Normal rate and regular rhythm.      Heart sounds: Normal heart sounds.   Pulmonary:      Effort: Pulmonary effort is normal. No respiratory distress.      Breath sounds: Decreased breath sounds and wheezing present.   Abdominal:      General: Bowel sounds are normal.      Palpations: Abdomen is soft.   Musculoskeletal:         General: Normal range of motion.      Cervical back: Normal range of motion and neck supple.   Skin:     General: Skin is warm and dry.   Neurological:      Mental Status: He is alert and oriented to person, place, and time.   Psychiatric:         Behavior: Behavior  normal.         Thought Content: Thought content normal.         Judgment: Judgment normal.         Vital Signs  ED Triage Vitals   Temperature Pulse Respirations Blood Pressure SpO2   12/22/23 2034 12/22/23 2034 12/22/23 2034 12/22/23 2034 12/22/23 2034   (!) 97 °F (36.1 °C) 77 13 136/78 98 %      Temp Source Heart Rate Source Patient Position - Orthostatic VS BP Location FiO2 (%)   12/22/23 2034 12/22/23 2130 12/22/23 2034 12/22/23 2034 --   Tympanic Monitor Lying Left arm       Pain Score       12/22/23 2034       7           Vitals:    12/22/23 2034 12/22/23 2107 12/22/23 2130 12/22/23 2200   BP: 136/78  120/59 129/59   Pulse: 77 74 77 80   Patient Position - Orthostatic VS: Lying            Visual Acuity      ED Medications  Medications   sodium chloride (PF) 0.9 % injection 3 mL (has no administration in time range)   albuterol inhalation solution 10 mg (10 mg Nebulization Given 12/22/23 2107)   ipratropium (ATROVENT) 0.02 % inhalation solution 1 mg (1 mg Nebulization Given 12/22/23 2107)   sodium chloride 0.9 % inhalation solution 12 mL (12 mL Nebulization Given 12/22/23 2107)   sodium chloride 0.9 % bolus 1,000 mL (0 mL Intravenous Stopped 12/22/23 2221)   methylPREDNISolone sodium succinate (Solu-MEDROL) injection 125 mg (125 mg Intravenous Given 12/22/23 2122)       Diagnostic Studies  Results Reviewed       Procedure Component Value Units Date/Time    HS Troponin I 4hr [[id number]]     Lab Status: No result Specimen: Blood     TSH, 3rd generation [[id number]]  (Normal) Collected: 12/22/23 2049    Lab Status: Final result Specimen: Blood from Arm, Right Updated: 12/22/23 2128     TSH 3RD GENERATON 0.864 uIU/mL     HS Troponin 0hr (reflex protocol) [[id number]]  (Normal) Collected: 12/22/23 2049    Lab Status: Final result Specimen: Blood from Arm, Right Updated: 12/22/23 2119     hs TnI 0hr 3 ng/L     HS Troponin I 2hr [[id number]]     Lab Status: No result Specimen: Blood     Comprehensive metabolic panel  [[id number]]  (Abnormal) Collected: 12/22/23 2049    Lab Status: Final result Specimen: Blood from Arm, Right Updated: 12/22/23 2117     Sodium 138 mmol/L      Potassium 3.6 mmol/L      Chloride 102 mmol/L      CO2 28 mmol/L      ANION GAP 8 mmol/L      BUN 13 mg/dL      Creatinine 1.11 mg/dL      Glucose 109 mg/dL      Calcium 9.4 mg/dL      AST 22 U/L      ALT 44 U/L      Alkaline Phosphatase 78 U/L      Total Protein 7.3 g/dL      Albumin 4.5 g/dL      Total Bilirubin 1.17 mg/dL      eGFR 89 ml/min/1.73sq m     Narrative:      National Kid[employer] guidelines for Chronic Kidney Disease (CKD):     Stage 1 with normal or high GFR (GFR > 90 mL/min/1.73 square meters)    Stage 2 Mild CKD (GFR = 60-89 mL/min/1.73 square meters)    Stage 3A Moderate CKD (GFR = 45-59 mL/min/1.73 square meters)    Stage 3B Moderate CKD (GFR = 30-44 mL/min/1.73 square meters)    Stage 4 Severe CKD (GFR = 15-29 mL/min/1.73 square meters)    Stage 5 End Stage CKD (GFR <15 mL/min/1.73 square meters)  Note: GFR calculation is accurate only with a steady state creatinine    D-dimer, quantitative [[id number]]  (Normal) Collected: 12/22/23 2049    Lab Status: Final result Specimen: Blood from Arm, Right Updated: 12/22/23 2108     D-Dimer, Quant 0.44 ug/ml FEU     CBC and differential [[id number]] Collected: 12/22/23 2049    Lab Status: Final result Specimen: Blood from Arm, Right Updated: 12/22/23 2054     WBC 6.59 Thousand/uL      RBC 5.31 Million/uL      Hemoglobin 15.3 g/dL      Hematocrit 46.2 %      MCV 87 fL      MCH 28.8 pg      MCHC 33.1 g/dL      RDW 12.6 %      MPV 10.1 fL      Platelets 351 Thousands/uL      nRBC 0 /100 WBCs      Neutrophils Relative 52 %      Immat GRANS % 0 %      Lymphocytes Relative 35 %      Monocytes Relative 6 %      Eosinophils Relative 6 %      Basophils Relative 1 %      Neutrophils Absolute 3.50 Thousands/µL      Immature Grans Absolute 0.01 Thousand/uL      Lymphocytes Absolute 2.28 Thousands/µL       Monocytes Absolute 0.39 Thousand/µL      Eosinophils Absolute 0.38 Thousand/µL      Basophils Absolute 0.03 Thousands/µL                    X-ray chest 1 view portable   ED Interpretation by David Flowers MD (12/22 2158)   Bronchial cuffing                 Procedures  ECG 12 Lead Documentation Only    Date/Time: 12/22/2023 11:35 PM    Performed by: David Flowers MD  Authorized by: David Flowers MD    ECG reviewed by me, the ED Provider: yes    Patient location:  ED  Previous ECG:     Comparison to cardiac monitor: Yes    Interpretation:     Interpretation: normal    Rate:     ECG rate assessment: normal    Rhythm:     Rhythm: sinus rhythm    Ectopy:     Ectopy: none    QRS:     QRS axis:  Normal  Conduction:     Conduction: normal    ST segments:     ST segments:  Normal  T waves:     T waves: non-specific             ED Course             HEART Risk Score      Flowsheet Row Most Recent Value   Heart Score Risk Calculator    History 0 Filed at: 12/22/2023 2205   ECG 1 Filed at: 12/22/2023 2205   Age 0 Filed at: 12/22/2023 2205   Risk Factors 1 Filed at: 12/22/2023 2205   Troponin 0 Filed at: 12/22/2023 2205   HEART Score 2 Filed at: 12/22/2023 2205                          SBIRT 20yo+      Flowsheet Row Most Recent Value   Initial Alcohol Screen: US AUDIT-C     1. How often do you have a drink containing alcohol? 0 Filed at: 12/22/2023 2035   2. How many drinks containing alcohol do you have on a typical day you are drinking?  0 Filed at: 12/22/2023 2035   3a. Male UNDER 65: How often do you have five or more drinks on one occasion? 0 Filed at: 12/22/2023 2035   3b. FEMALE Any Age, or MALE 65+: How often do you have 4 or more drinks on one occassion? 0 Filed at: 12/22/2023 2035   Audit-C Score 0 Filed at: 12/22/2023 2035   GIANNA: How many times in the past year have you...    Used an illegal drug or used a prescription medication for non-medical reasons? Never Filed at: 12/22/2023 2035                       Medical Decision Making  No radiation to the back. No tearing pain going to the back. Normal bilat UE pulses.   No history of PE. No new unilateral leg swelling. D Dimer WNL.  Non exertional. No sweats.  No vomiting.   No fever to suggest pneumonia.   No  vomiting or subcue crepitus. No skin rash.   No syncope.  No new murmur to suggest symptomatic valvular stenosis/ regurg or ruptured chordae.   Vitals do not suggest tamponade.   No palpable crepitus on exam.   No recent viral syndromes to suggest Marlys/Myocarditis.  Symptoms improved with nebulization.  Started on steroids.  Score low risk.  Discussed warning signs and symptoms with the patient as well as when to return to the emergency department versus follow up with [facility]. Patient states understanding and agreement with the plan.  Patient care delayed due to critical capacity in the emergency department.      This note was completed using dictation software.             Problems Addressed:  Asthma: chronic illness or injury with exacerbation, progression, or side effects of treatment  Chest pain, unspecified: acute illness or injury    Amount and/or Complexity of Data Reviewed  Independent Historian: friend  Labs: ordered.  Radiology: ordered and independent interpretation performed.  ECG/medicine tests: ordered and independent interpretation performed.     Details: Normal sinus rhythm, no ST elevations    Risk  Prescription drug management.             Disposition  Final diagnoses:   Chest pain, unspecified   Asthma     Time reflects when diagnosis was documented in both MDM as applicable and the Disposition within this note       Time User Action Codes Description Comment    12/22/2023 10:04 PM David Flowers [R07.9] Chest pain, unspecified     12/22/2023 10:05 PM David Flowers [J45.909] Asthma           ED Disposition       ED Disposition   Discharge    Condition   Stable    Date/Time   Fri Dec 22, 2023 5831    Comment   Arthur  Cain discharge to home/self care.                   Follow-up Information       Follow up With Specialties Details Why Contact Info    Umesh Dudley, DO Family Medicine In 1 day  [address] [id number]  [phone]      Your pulmonologist    ASAP            Discharge Medication List as of 12/22/2023 10:05 PM        START taking these medications    Details   albuterol (2.5 mg/3 mL) 0.083 % nebulizer solution Take 3 mL (2.5 mg total) by nebulization every 6 (six) hours as needed for wheezing or shortness of breath, Starting Fri 12/22/2023, Normal      methylPREDNISolone 4 MG tablet therapy pack Use as directed on package, Normal           CONTINUE these medications which have NOT CHANGED    Details   albuterol (ProAir HFA) 90 mcg/act inhaler Inhale 2 puffs every 6 (six) hours as needed for wheezing, Starting Wed 2/15/2023, Normal      gabapentin (NEURONTIN) 300 mg capsule 300mg in am and 600mg at HS, No Print      Ketorolac Tromethamine (TORADOL ORAL PO) Take 10 mg by mouth, Historical Med      tiZANidine (Zanaflex) 4 mg tablet Take 0.5 tablets (2 mg total) by mouth every 8 (eight) hours as needed for muscle spasms, Starting Thu 7/27/2023, Normal             No discharge procedures on file.    PDMP Review       None            ED Provider  Electronically Signed by             David Flowers MD  12/22/23 2162

## 2023-12-23 NOTE — PROGRESS NOTES
"  [facility]        NAME: Arthur Barlow is a 28 y.o. male  : 1995    MRN: [id number]  DATE: 2023  TIME: 7:31 PM    Assessment and Plan   Chest pain, unspecified type [R07.9]  1. Chest pain, unspecified type  ECG 12 lead    Transfer to other facility      2. Exposure to COVID-19 virus  Covid/Flu-Office Collect    Transfer to other facility      3. Viral syndrome  Transfer to other facility      4. Dizziness and giddiness  Transfer to other facility      5. Palpitations  Transfer to other facility      6. Shortness of breath  Transfer to other facility        EKG-no obvious ST elevations or depressions  Sharp stabbing chest pain that sometimes feels like a pressure x 2 weeks.  Yesterday states he \"felt like I was having a heart attack\".  With associated palpitations, lightheadedness where he felt like he was going to pass out and back pain.  States COVID exposure this week with fevers, chills and cough/cold-like symptoms.  Admits to intermittent shortness of breath and wheezing.  Taking inhaler with no improvement  Explained the importance of taking an ambulance. Patient refuses transport by ambulance, states can drive self. Alert, oriented, capable of making own medical decisions and understands the risks of refusing ambulance transfer.  Patient would like to go via private vehicle to [facility] emergency department.    Patient Instructions     Patient is refusing ambulance transfer, he would like to go via private vehicle to [facility] emergency department for further evaluation, workup and treatment.  Hospital address verified-he agrees to go immediately to the emergency department.    Chief Complaint     Chief Complaint   Patient presents with    COVID-19 Exposure     Exposed he this on Tuesday. Has body aches, fever and chills. Has not had any recent Covid vaccines. Has not taking anything for symptoms except drink soup.  Has not tested himself for " "Covid.          History of Present Illness       28-year-old male presents for evaluation of runny nose, nasal congestion, mild intermittent cough x 3 to 4 days.  States he has been having fevers where he felt hot/cold with chills and mild generalized bodyaches.  States he did have a COVID exposure on Monday, symptoms started Tuesday, states they told him they tested positive for COVID yesterday.  Has not tested himself for COVID.  States he has been taking over-the-counter cough/cold medication such as Mucinex with no improvement.  States he is also had some intermittent wheezing and shortness of breath-has been trying his inhalers with mild improvement.  States yesterday at work he had severe chest pain where he \"felt like I was having a heart attack\" that he describes as sharp, stabbing and like a pressure and also had lightheadedness where he felt like he was going to pass out and back pain.  Denies any syncopal episodes.  States for the past 2 weeks prior to these cold-like symptoms he has been having this sharp, stabbing and pressure-like chest pain.  States it does change in intensity.  Also notes intermittent palpitations and feels like his heart is beating out of his chest and racing.  States he never had anything like this prior to the past 2 weeks and felt that it got worse yesterday.  Denies any history of heart problems.  States family history of heart problems.  Denies any history of blood clots.  States he is a previous tobacco smoker.        Review of Systems   Review of Systems   Constitutional:  Positive for chills and fever. Negative for fatigue.   HENT:  Positive for congestion and rhinorrhea. Negative for ear pain, sinus pressure, sore throat, trouble swallowing and voice change.    Eyes:  Negative for itching and visual disturbance.   Respiratory:  Positive for cough, shortness of breath and wheezing. Negative for chest tightness.    Cardiovascular:  Positive for chest pain and palpitations. " "Negative for leg swelling.   Gastrointestinal:  Negative for abdominal pain, diarrhea, nausea and vomiting.   Genitourinary:  Negative for decreased urine volume.   Musculoskeletal:  Positive for myalgias. Negative for joint swelling.   Skin:  Negative for rash.   Neurological:  Positive for dizziness and light-headedness. Negative for seizures, syncope, weakness, numbness and headaches.   All other systems reviewed and are negative.        Current Medications       Current Outpatient Medications:     albuterol (ProAir HFA) 90 mcg/act inhaler, Inhale 2 puffs every 6 (six) hours as needed for wheezing, Disp: 8.5 g, Rfl: 0    gabapentin (NEURONTIN) 300 mg capsule, 300mg in am and 600mg at HS, Disp: 120 capsule, Rfl: 5    Ketorolac Tromethamine (TORADOL ORAL PO), Take 10 mg by mouth (Patient not taking: Reported on 9/6/2023), Disp: , Rfl:     tiZANidine (Zanaflex) 4 mg tablet, Take 0.5 tablets (2 mg total) by mouth every 8 (eight) hours as needed for muscle spasms (Patient not taking: Reported on 12/22/2023), Disp: 60 tablet, Rfl: 0    Current Allergies     Allergies as of 12/22/2023    (No Known Allergies)            The following portions of the patient's history were reviewed and updated as appropriate: allergies, current medications, past family history, past medical history, past social history, past surgical history and problem list.     Past Medical History:   Diagnosis Date    Allergic     Asthma     Stomach pain        Past Surgical History:   Procedure Laterality Date    WRIST ARTHROTOMY Left        Family History   Problem Relation Age of Onset    Hypertension Mother     Alcohol abuse Father     Other Father         Tobacco abuse         Medications have been verified.        Objective   /68   Pulse 77   Temp 98.6 °F (37 °C)   Resp 18   Ht 5' 7\" (1.702 m)   Wt 93 kg (205 lb)   SpO2 97%   BMI 32.11 kg/m²        Physical Exam     Physical Exam  Vitals and nursing note reviewed.   Constitutional:  "      General: He is not in acute distress.     Appearance: Normal appearance. He is well-developed. He is not ill-appearing or toxic-appearing.   HENT:      Mouth/Throat:      Mouth: Mucous membranes are moist.      Pharynx: Oropharynx is clear. Uvula midline. No oropharyngeal exudate, posterior oropharyngeal erythema or uvula swelling.   Eyes:      Extraocular Movements: Extraocular movements intact.      Pupils: Pupils are equal, round, and reactive to light.      Comments: No nystagmus, no pain with EOM   Cardiovascular:      Rate and Rhythm: Normal rate and regular rhythm.      Heart sounds: Normal heart sounds.   Pulmonary:      Effort: Pulmonary effort is normal. No respiratory distress.      Breath sounds: Normal breath sounds. No wheezing.   Chest:      Chest wall: No tenderness.   Skin:     Capillary Refill: Capillary refill takes less than 2 seconds.   Neurological:      Mental Status: He is alert and oriented to person, place, and time.      Comments: Moving all 4 extremities, no obvious neurological deficits   Psychiatric:         Behavior: Behavior normal.

## 2024-01-02 ENCOUNTER — OFFICE VISIT (OUTPATIENT)
Dept: FAMILY MEDICINE CLINIC | Facility: CLINIC | Age: 29
End: 2024-01-02
Payer: COMMERCIAL

## 2024-01-02 VITALS
WEIGHT: 213.38 LBS | TEMPERATURE: 98.1 F | HEART RATE: 88 BPM | SYSTOLIC BLOOD PRESSURE: 114 MMHG | OXYGEN SATURATION: 96 % | HEIGHT: 67 IN | DIASTOLIC BLOOD PRESSURE: 70 MMHG | BODY MASS INDEX: 33.49 KG/M2

## 2024-01-02 DIAGNOSIS — J45.40 MODERATE PERSISTENT ASTHMA WITHOUT COMPLICATION: Primary | ICD-10-CM

## 2024-01-02 DIAGNOSIS — U07.1 COVID-19: ICD-10-CM

## 2024-01-02 DIAGNOSIS — J45.41 MODERATE PERSISTENT ASTHMA WITH ACUTE EXACERBATION: ICD-10-CM

## 2024-01-02 PROCEDURE — 99204 OFFICE O/P NEW MOD 45 MIN: CPT | Performed by: NURSE PRACTITIONER

## 2024-01-02 RX ORDER — ALBUTEROL SULFATE 90 UG/1
2 AEROSOL, METERED RESPIRATORY (INHALATION) EVERY 4 HOURS PRN
Qty: 18 G | Refills: 3 | Status: SHIPPED | OUTPATIENT
Start: 2024-01-02

## 2024-01-02 RX ORDER — IPRATROPIUM BROMIDE AND ALBUTEROL SULFATE 2.5; .5 MG/3ML; MG/3ML
3 SOLUTION RESPIRATORY (INHALATION) EVERY 4 HOURS PRN
Qty: 120 ML | Refills: 5 | Status: SHIPPED | OUTPATIENT
Start: 2024-01-02

## 2024-01-02 RX ORDER — FLUTICASONE FUROATE AND VILANTEROL 200; 25 UG/1; UG/1
1 POWDER RESPIRATORY (INHALATION) DAILY
Start: 2024-01-02

## 2024-01-02 NOTE — ASSESSMENT & PLAN NOTE
Patient currently on breo. Has ventolin as needed  Also has nebulizer at home   Will send patient for pfts in 3 months given recent covid diagnosis

## 2024-01-02 NOTE — LETTER
January 2, 2024     Patient: Arthur Barlow  YOB: 1995  Date of Visit: 1/2/2024      To Whom it May Concern:    Arthur Barlow is under my professional care. Arthur was seen in my office on 1/2/2024. Arthur may return to work on 1/4/2024 without restrictions .    If you have any questions or concerns, please don't hesitate to call.         Sincerely,          SUSAN Lenz        CC: No Recipients

## 2024-01-02 NOTE — ASSESSMENT & PLAN NOTE
Patient tested positive 12/22/2023   He is currently still out of work. Will return him for 1/4/2024  Patient asthma is poorly controlled currently

## 2024-01-02 NOTE — PROGRESS NOTES
Name: Arthur Barlow      : 1995      MRN: [id number]  Encounter Provider: SUSAN Lenz  Encounter Date: 2024   Encounter department: [facility]    Assessment & Plan     1. Moderate persistent asthma without complication  Assessment & Plan:  Patient currently on breo. Has ventolin as needed  Also has nebulizer at home   Will send patient for pfts in 3 months given recent covid diagnosis     Orders:  -     Complete PFT with post bronchodilator; Future; Expected date: 2024  -     albuterol (Ventolin HFA) 90 mcg/act inhaler; Inhale 2 puffs every 4 (four) hours as needed for wheezing or shortness of breath    2. COVID-19  Assessment & Plan:  Patient tested positive 2023   He is currently still out of work. Will return him for 2024  Patient asthma is poorly controlled currently       Orders:  -     Complete PFT with post bronchodilator; Future; Expected date: 2024    3. Moderate persistent asthma with acute exacerbation  Assessment & Plan:  Secondary to recent covid illness     Orders:  -     ipratropium-albuterol (DUO-NEB) 0.5-2.5 mg/3 mL nebulizer solution; Take 3 mL by nebulization every 4 (four) hours as needed for wheezing or shortness of breath  -     fluticasone-vilanterol (Breo Ellipta) 200-25 mcg/actuation inhaler; Inhale 1 puff daily Rinse mouth after use.           Subjective      Patient presents today for follow up.   He was seen in  at the ER for on and off chest pain for several weeks. Patient EKG was normal.   He has underlying asthma. He is still struggling with shortness of breath   He was found to have covid. He was given duoneb nebulizer and solumedrol and was discharged home with nebulizer Medication.  He has only bee using his nebulizer as needed.   Patient did also recently finish a taper.        Review of Systems   Constitutional:  Positive for fatigue. Negative for fever.   Respiratory:  Positive for shortness of  "breath and wheezing.        Current Outpatient Medications on File Prior to Visit   Medication Sig   • [DISCONTINUED] albuterol (2.5 mg/3 mL) 0.083 % nebulizer solution Take 3 mL (2.5 mg total) by nebulization every 6 (six) hours as needed for wheezing or shortness of breath   • [DISCONTINUED] albuterol (ProAir HFA) 90 mcg/act inhaler Inhale 2 puffs every 6 (six) hours as needed for wheezing   • [DISCONTINUED] gabapentin (NEURONTIN) 300 mg capsule 300mg in am and 600mg at HS (Patient not taking: Reported on 1/2/2024)   • [DISCONTINUED] Ketorolac Tromethamine (TORADOL ORAL PO) Take 10 mg by mouth (Patient not taking: Reported on 9/6/2023)   • [DISCONTINUED] levocetirizine (XYZAL) 5 MG tablet Take 1 tablet (5 mg total) by mouth daily   • [DISCONTINUED] methylPREDNISolone 4 MG tablet therapy pack Use as directed on package (Patient not taking: Reported on 1/2/2024)   • [DISCONTINUED] tiZANidine (Zanaflex) 4 mg tablet Take 0.5 tablets (2 mg total) by mouth every 8 (eight) hours as needed for muscle spasms (Patient not taking: Reported on 12/22/2023)       Objective     /70 (BP Location: Left arm, Patient Position: Sitting, Cuff Size: Large)   Pulse 88   Temp 98.1 °F (36.7 °C) (Temporal)   Ht 5' 7\" (1.702 m)   Wt 96.8 kg (213 lb 6 oz)   SpO2 96%   BMI 33.42 kg/m²     Physical Exam  Vitals and nursing note reviewed.   Constitutional:       Appearance: Normal appearance. He is well-developed. He is obese. He is ill-appearing.   HENT:      Head: Normocephalic and atraumatic.   Eyes:      Extraocular Movements: Extraocular movements intact.      Conjunctiva/sclera: Conjunctivae normal.      Pupils: Pupils are equal.   Cardiovascular:      Rate and Rhythm: Normal rate and regular rhythm.      Heart sounds: S1 normal and S2 normal. No murmur heard.  Pulmonary:      Effort: Pulmonary effort is normal. Prolonged expiration present. No respiratory distress.      Breath sounds: Examination of the right-upper field " reveals decreased breath sounds. Examination of the left-upper field reveals decreased breath sounds. Decreased breath sounds present. No wheezing or rhonchi.   Neurological:      Mental Status: He is alert and oriented to person, place, and time.   Psychiatric:         Mood and Affect: Mood normal.         Thought Content: Thought content normal.       SUSAN Lenz

## 2024-01-03 ENCOUNTER — PATIENT MESSAGE (OUTPATIENT)
Dept: FAMILY MEDICINE CLINIC | Facility: CLINIC | Age: 29
End: 2024-01-03

## 2024-02-21 PROBLEM — Z13.6 SCREENING FOR CARDIOVASCULAR, RESPIRATORY, AND GENITOURINARY DISEASES: Status: RESOLVED | Noted: 2018-06-04 | Resolved: 2024-02-21

## 2024-02-21 PROBLEM — Z13.89 SCREENING FOR CARDIOVASCULAR, RESPIRATORY, AND GENITOURINARY DISEASES: Status: RESOLVED | Noted: 2018-06-04 | Resolved: 2024-02-21

## 2024-02-21 PROBLEM — Z13.83 SCREENING FOR CARDIOVASCULAR, RESPIRATORY, AND GENITOURINARY DISEASES: Status: RESOLVED | Noted: 2018-06-04 | Resolved: 2024-02-21

## 2024-02-21 PROBLEM — Z00.00 HEALTHCARE MAINTENANCE: Status: RESOLVED | Noted: 2020-06-01 | Resolved: 2024-02-21

## 2024-02-21 PROBLEM — Z13.1 SCREENING FOR DIABETES MELLITUS (DM): Status: RESOLVED | Noted: 2018-06-04 | Resolved: 2024-02-21

## 2025-04-22 ENCOUNTER — OFFICE VISIT (OUTPATIENT)
Dept: FAMILY MEDICINE CLINIC | Facility: CLINIC | Age: 30
End: 2025-04-22

## 2025-04-22 VITALS
DIASTOLIC BLOOD PRESSURE: 80 MMHG | SYSTOLIC BLOOD PRESSURE: 126 MMHG | RESPIRATION RATE: 18 BRPM | BODY MASS INDEX: 34.91 KG/M2 | HEART RATE: 76 BPM | HEIGHT: 67 IN | TEMPERATURE: 98.6 F | OXYGEN SATURATION: 99 % | WEIGHT: 222.4 LBS

## 2025-04-22 DIAGNOSIS — Z13.220 SCREENING FOR HYPERLIPIDEMIA: ICD-10-CM

## 2025-04-22 DIAGNOSIS — R06.02 SOB (SHORTNESS OF BREATH): ICD-10-CM

## 2025-04-22 DIAGNOSIS — J45.41 MODERATE PERSISTENT ASTHMA WITH ACUTE EXACERBATION: ICD-10-CM

## 2025-04-22 DIAGNOSIS — Z76.89 ENCOUNTER TO ESTABLISH CARE WITH NEW DOCTOR: ICD-10-CM

## 2025-04-22 DIAGNOSIS — J45.40 MODERATE PERSISTENT ASTHMA WITHOUT COMPLICATION: Primary | ICD-10-CM

## 2025-04-22 RX ORDER — IPRATROPIUM BROMIDE AND ALBUTEROL SULFATE 2.5; .5 MG/3ML; MG/3ML
3 SOLUTION RESPIRATORY (INHALATION) EVERY 4 HOURS PRN
Qty: 120 ML | Refills: 5 | Status: SHIPPED | OUTPATIENT
Start: 2025-04-22

## 2025-04-22 RX ORDER — BUDESONIDE AND FORMOTEROL FUMARATE DIHYDRATE 80; 4.5 UG/1; UG/1
2 AEROSOL RESPIRATORY (INHALATION) 2 TIMES DAILY
Qty: 10.2 G | Refills: 1 | Status: SHIPPED | OUTPATIENT
Start: 2025-04-22

## 2025-04-22 RX ORDER — ALBUTEROL SULFATE 90 UG/1
2 INHALANT RESPIRATORY (INHALATION) EVERY 4 HOURS PRN
Qty: 18 G | Refills: 3 | Status: SHIPPED | OUTPATIENT
Start: 2025-04-22

## 2025-04-22 NOTE — ASSESSMENT & PLAN NOTE
Orders:    ipratropium-albuterol (DUO-NEB) 0.5-2.5 mg/3 mL nebulizer solution; Take 3 mL by nebulization every 4 (four) hours as needed for wheezing or shortness of breath

## 2025-04-22 NOTE — ASSESSMENT & PLAN NOTE
Patient reports Breo not working as well as it used to.  Reports chest tightness and using albuterol more frequently.  He smokes marijuana but has been cutting down.  I have advised for him to stop smoking as this may be triggering his asthma.  Breo was around $300 a month.  He reports stretching out inhaler due to cost and will go spans without taking it.  We will try switching to Symbicort which seems more affordable based on preliminary plan check and is a good option instead of Breo.  Side effects discussed.  Continue albuterol every 4 as needed in the meantime as well as nebulizer treatments.  Orders:    budesonide-formoterol (Symbicort) 80-4.5 MCG/ACT inhaler; Inhale 2 puffs 2 (two) times a day Rinse mouth after use.    albuterol (Ventolin HFA) 90 mcg/act inhaler; Inhale 2 puffs every 4 (four) hours as needed for wheezing or shortness of breath

## 2025-04-22 NOTE — PROGRESS NOTES
Name: Artuhr Barlow      : 1995      MRN: [id number]  Encounter Provider: Joce Claros MD  Encounter Date: 2025   Encounter department: [facility]  :  Assessment & Plan  Moderate persistent asthma without complication  Patient reports Breo not working as well as it used to.  Reports chest tightness and using albuterol more frequently.  He smokes marijuana but has been cutting down.  I have advised for him to stop smoking as this may be triggering his asthma.  Breo was around $300 a month.  He reports stretching out inhaler due to cost and will go spans without taking it.  We will try switching to Symbicort which seems more affordable based on preliminary plan check and is a good option instead of Breo.  Side effects discussed.  Continue albuterol every 4 as needed in the meantime as well as nebulizer treatments.  Orders:    budesonide-formoterol (Symbicort) 80-4.5 MCG/ACT inhaler; Inhale 2 puffs 2 (two) times a day Rinse mouth after use.    albuterol (Ventolin HFA) 90 mcg/act inhaler; Inhale 2 puffs every 4 (four) hours as needed for wheezing or shortness of breath    Screening for hyperlipidemia    Orders:    Lipid panel; Future    SOB (shortness of breath)    Orders:    TSH, 3rd generation with Free T4 reflex; Future    CBC and differential; Future    Comprehensive metabolic panel; Future    Lipid panel; Future    Moderate persistent asthma with acute exacerbation    Orders:    ipratropium-albuterol (DUO-NEB) 0.5-2.5 mg/3 mL nebulizer solution; Take 3 mL by nebulization every 4 (four) hours as needed for wheezing or shortness of breath    Encounter to establish care with new doctor                History of Present Illness   Patient presents office today to establish care.  Has concerns for his asthma.  Shortness of breath today.  On Breo but has been working as well.  He says it is really expensive so sometimes he stretches out the inhaler and does not take it  "all the time.  He also smokes marijuana.  He used to smoke a lot but recently started cutting down.  No wheezing, coughing or shortness of breath.  He does have chest tightness.  Feels like someone is holding his chest from expanding.  The Breo cost him $300 a month.  Denies any other significant medical history.  Not on any other medications besides inhalers.      Review of Systems   All other systems reviewed and are negative.      Objective   /80   Pulse 76   Temp 98.6 °F (37 °C) (Temporal)   Resp 18   Ht 5' 7\" (1.702 m)   Wt 101 kg (222 lb 6.4 oz)   SpO2 99%   BMI 34.83 kg/m²      Physical Exam  Vitals and nursing note reviewed.   Constitutional:       General: He is not in acute distress.     Appearance: Normal appearance. He is well-developed. He is not ill-appearing, toxic-appearing or diaphoretic.   HENT:      Head: Normocephalic and atraumatic.   Eyes:      General:         Right eye: No discharge.         Left eye: No discharge.      Extraocular Movements: Extraocular movements intact.      Conjunctiva/sclera: Conjunctivae normal.   Cardiovascular:      Rate and Rhythm: Normal rate.      Pulses:           Dorsalis pedis pulses are 2+ on the right side and 2+ on the left side.        Posterior tibial pulses are 2+ on the right side and 2+ on the left side.   Pulmonary:      Effort: Pulmonary effort is normal.      Breath sounds: Normal breath sounds.   Musculoskeletal:      Cervical back: Normal range of motion and neck supple.   Feet:      Right foot:      Skin integrity: No ulcer, skin breakdown, erythema, warmth, callus or dry skin.      Left foot:      Skin integrity: No ulcer, skin breakdown, erythema, warmth, callus or dry skin.   Skin:     General: Skin is dry.      Capillary Refill: Capillary refill takes less than 2 seconds.   Neurological:      Mental Status: He is alert and oriented to person, place, and time.   Psychiatric:         Mood and Affect: Mood normal.         Behavior: " Behavior normal.         Thought Content: Thought content normal.         Judgment: Judgment normal.

## 2025-04-23 ENCOUNTER — TELEPHONE (OUTPATIENT)
Age: 30
End: 2025-04-23

## 2025-04-23 NOTE — TELEPHONE ENCOUNTER
PA for Budesonide-Formoterol Fumarate 80-4.5MCGSUBMITTED         via    [x]CMM-KEY: [id number]  [x]PA sent as URGENT    All office notes, labs and other pertaining documents and studies sent. Clinical questions answered. Awaiting determination from insurance company.     Turnaround time for your insurance to make a decision on your Prior Authorization can take 7-21 business days.

## 2025-04-23 NOTE — TELEPHONE ENCOUNTER
PA for Budesonide-Formoterol Fumarate 80-4.5MCG  APPROVED     Date(s) approved         Patient advised by          [[email or web address] Message  []Phone call   []LMOM  []L/M to call office as no active Communication consent on file  []Unable to leave detailed message as VM not approved on Communication consent       Approval letter scanned into [employer] No upon determination

## 2025-04-24 ENCOUNTER — APPOINTMENT (OUTPATIENT)
Dept: LAB | Facility: CLINIC | Age: 30
End: 2025-04-24
Payer: COMMERCIAL

## 2025-04-24 DIAGNOSIS — Z13.220 SCREENING FOR HYPERLIPIDEMIA: ICD-10-CM

## 2025-04-24 DIAGNOSIS — R06.02 SOB (SHORTNESS OF BREATH): ICD-10-CM

## 2025-04-24 LAB
ALBUMIN SERPL BCG-MCNC: 4.6 G/DL (ref 3.5–5)
ALP SERPL-CCNC: 89 U/L (ref 34–104)
ALT SERPL W P-5'-P-CCNC: 46 U/L (ref 7–52)
ANION GAP SERPL CALCULATED.3IONS-SCNC: 8 MMOL/L (ref 4–13)
AST SERPL W P-5'-P-CCNC: 28 U/L (ref 13–39)
BASOPHILS # BLD AUTO: 0.04 THOUSANDS/ÂΜL (ref 0–0.1)
BASOPHILS NFR BLD AUTO: 1 % (ref 0–1)
BILIRUB SERPL-MCNC: 1.01 MG/DL (ref 0.2–1)
BUN SERPL-MCNC: 10 MG/DL (ref 5–25)
CALCIUM SERPL-MCNC: 9.4 MG/DL (ref 8.4–10.2)
CHLORIDE SERPL-SCNC: 100 MMOL/L (ref 96–108)
CHOLEST SERPL-MCNC: 213 MG/DL (ref ?–200)
CO2 SERPL-SCNC: 29 MMOL/L (ref 21–32)
CREAT SERPL-MCNC: 1.02 MG/DL (ref 0.6–1.3)
EOSINOPHIL # BLD AUTO: 0.47 THOUSAND/ÂΜL (ref 0–0.61)
EOSINOPHIL NFR BLD AUTO: 6 % (ref 0–6)
ERYTHROCYTE [DISTWIDTH] IN BLOOD BY AUTOMATED COUNT: 13.2 % (ref 11.6–15.1)
GFR SERPL CREATININE-BSD FRML MDRD: 98 ML/MIN/1.73SQ M
GLUCOSE P FAST SERPL-MCNC: 87 MG/DL (ref 65–99)
HCT VFR BLD AUTO: 47.3 % (ref 36.5–49.3)
HDLC SERPL-MCNC: 35 MG/DL
HGB BLD-MCNC: 15.2 G/DL (ref 12–17)
IMM GRANULOCYTES # BLD AUTO: 0.02 THOUSAND/UL (ref 0–0.2)
IMM GRANULOCYTES NFR BLD AUTO: 0 % (ref 0–2)
LDLC SERPL CALC-MCNC: 115 MG/DL (ref 0–100)
LYMPHOCYTES # BLD AUTO: 2.4 THOUSANDS/ÂΜL (ref 0.6–4.47)
LYMPHOCYTES NFR BLD AUTO: 30 % (ref 14–44)
MCH RBC QN AUTO: 27.9 PG (ref 26.8–34.3)
MCHC RBC AUTO-ENTMCNC: 32.1 G/DL (ref 31.4–37.4)
MCV RBC AUTO: 87 FL (ref 82–98)
MONOCYTES # BLD AUTO: 0.54 THOUSAND/ÂΜL (ref 0.17–1.22)
MONOCYTES NFR BLD AUTO: 7 % (ref 4–12)
NEUTROPHILS # BLD AUTO: 4.54 THOUSANDS/ÂΜL (ref 1.85–7.62)
NEUTS SEG NFR BLD AUTO: 56 % (ref 43–75)
NONHDLC SERPL-MCNC: 178 MG/DL
NRBC BLD AUTO-RTO: 0 /100 WBCS
PLATELET # BLD AUTO: 403 THOUSANDS/UL (ref 149–390)
PMV BLD AUTO: 10.7 FL (ref 8.9–12.7)
POTASSIUM SERPL-SCNC: 3.8 MMOL/L (ref 3.5–5.3)
PROT SERPL-MCNC: 7.9 G/DL (ref 6.4–8.4)
RBC # BLD AUTO: 5.45 MILLION/UL (ref 3.88–5.62)
SODIUM SERPL-SCNC: 137 MMOL/L (ref 135–147)
TRIGL SERPL-MCNC: 317 MG/DL (ref ?–150)
TSH SERPL DL<=0.05 MIU/L-ACNC: 1.8 UIU/ML (ref 0.45–4.5)
WBC # BLD AUTO: 8.01 THOUSAND/UL (ref 4.31–10.16)

## 2025-04-24 PROCEDURE — 84443 ASSAY THYROID STIM HORMONE: CPT

## 2025-04-24 PROCEDURE — 36415 COLL VENOUS BLD VENIPUNCTURE: CPT

## 2025-04-24 PROCEDURE — 85025 COMPLETE CBC W/AUTO DIFF WBC: CPT

## 2025-04-24 PROCEDURE — 80053 COMPREHEN METABOLIC PANEL: CPT

## 2025-04-24 PROCEDURE — 80061 LIPID PANEL: CPT

## 2025-04-29 ENCOUNTER — TELEPHONE (OUTPATIENT)
Dept: FAMILY MEDICINE CLINIC | Facility: CLINIC | Age: 30
End: 2025-04-29

## 2025-04-29 NOTE — TELEPHONE ENCOUNTER
Called and left VM for pt. Per review, pt has created a duplicate account and scheduled himself for an appt on 5/1/25 with Dr. Claros. Pt is already scheduled for 5/20/25 for a physical on his original account. Please review with patient when he calls back. If patient prefers to be seen on 5/1/25 that is fine, just cancel the duplicate account appt and reschedule his physical to that same day and time. Feel free to reach out to the office with any questions/concerns.